# Patient Record
Sex: FEMALE | Race: WHITE | NOT HISPANIC OR LATINO | Employment: OTHER | URBAN - NONMETROPOLITAN AREA
[De-identification: names, ages, dates, MRNs, and addresses within clinical notes are randomized per-mention and may not be internally consistent; named-entity substitution may affect disease eponyms.]

---

## 2019-07-29 ENCOUNTER — APPOINTMENT (OUTPATIENT)
Dept: GENERAL RADIOLOGY | Facility: HOSPITAL | Age: 64
End: 2019-07-29

## 2019-07-29 ENCOUNTER — HOSPITAL ENCOUNTER (EMERGENCY)
Facility: HOSPITAL | Age: 64
Discharge: HOME | End: 2019-07-29
Attending: FAMILY MEDICINE | Admitting: FAMILY MEDICINE

## 2019-07-29 ENCOUNTER — APPOINTMENT (OUTPATIENT)
Dept: CT IMAGING | Facility: HOSPITAL | Age: 64
End: 2019-07-29

## 2019-07-29 VITALS
TEMPERATURE: 98.2 F | WEIGHT: 174 LBS | HEIGHT: 65 IN | RESPIRATION RATE: 18 BRPM | OXYGEN SATURATION: 98 % | SYSTOLIC BLOOD PRESSURE: 134 MMHG | DIASTOLIC BLOOD PRESSURE: 69 MMHG | HEART RATE: 61 BPM | BODY MASS INDEX: 28.99 KG/M2

## 2019-07-29 DIAGNOSIS — R07.9 CHEST PAIN WITH LOW RISK FOR CARDIAC ETIOLOGY: Primary | ICD-10-CM

## 2019-07-29 LAB
ALBUMIN SERPL-MCNC: 3.84 G/DL (ref 3.5–5.2)
ALBUMIN/GLOB SERPL: 1.1 G/DL
ALP SERPL-CCNC: 92 U/L (ref 39–117)
ALT SERPL W P-5'-P-CCNC: 15 U/L (ref 1–33)
ANION GAP SERPL CALCULATED.3IONS-SCNC: 14.3 MMOL/L (ref 5–15)
AST SERPL-CCNC: 23 U/L (ref 1–32)
BASOPHILS # BLD AUTO: 0.04 10*3/MM3 (ref 0–0.2)
BASOPHILS NFR BLD AUTO: 0.4 % (ref 0–1.5)
BILIRUB SERPL-MCNC: 0.6 MG/DL (ref 0.2–1.2)
BILIRUB UR QL STRIP: NEGATIVE
BUN BLD-MCNC: 9 MG/DL (ref 8–23)
BUN/CREAT SERPL: 10.7 (ref 7–25)
CALCIUM SPEC-SCNC: 9.6 MG/DL (ref 8.6–10.5)
CHLORIDE SERPL-SCNC: 99 MMOL/L (ref 98–107)
CLARITY UR: CLEAR
CO2 SERPL-SCNC: 25.7 MMOL/L (ref 22–29)
COLOR UR: YELLOW
CREAT BLD-MCNC: 0.84 MG/DL (ref 0.57–1)
D DIMER PPP FEU-MCNC: 0.59 MCGFEU/ML (ref 0–0.5)
DEPRECATED RDW RBC AUTO: 44.8 FL (ref 37–54)
EOSINOPHIL # BLD AUTO: 0.21 10*3/MM3 (ref 0–0.4)
EOSINOPHIL NFR BLD AUTO: 1.9 % (ref 0.3–6.2)
ERYTHROCYTE [DISTWIDTH] IN BLOOD BY AUTOMATED COUNT: 13.1 % (ref 12.3–15.4)
GFR SERPL CREATININE-BSD FRML MDRD: 69 ML/MIN/1.73
GLOBULIN UR ELPH-MCNC: 3.5 GM/DL
GLUCOSE BLD-MCNC: 107 MG/DL (ref 65–99)
GLUCOSE UR STRIP-MCNC: NEGATIVE MG/DL
HCT VFR BLD AUTO: 45 % (ref 34–46.6)
HGB BLD-MCNC: 15.3 G/DL (ref 12–15.9)
HGB UR QL STRIP.AUTO: NEGATIVE
HOLD SPECIMEN: NORMAL
HOLD SPECIMEN: NORMAL
IMM GRANULOCYTES # BLD AUTO: 0.03 10*3/MM3 (ref 0–0.05)
IMM GRANULOCYTES NFR BLD AUTO: 0.3 % (ref 0–0.5)
KETONES UR QL STRIP: ABNORMAL
LEUKOCYTE ESTERASE UR QL STRIP.AUTO: NEGATIVE
LIPASE SERPL-CCNC: 12 U/L (ref 13–60)
LYMPHOCYTES # BLD AUTO: 2.52 10*3/MM3 (ref 0.7–3.1)
LYMPHOCYTES NFR BLD AUTO: 22.8 % (ref 19.6–45.3)
MCH RBC QN AUTO: 32 PG (ref 26.6–33)
MCHC RBC AUTO-ENTMCNC: 34 G/DL (ref 31.5–35.7)
MCV RBC AUTO: 94.1 FL (ref 79–97)
MONOCYTES # BLD AUTO: 1.05 10*3/MM3 (ref 0.1–0.9)
MONOCYTES NFR BLD AUTO: 9.5 % (ref 5–12)
NEUTROPHILS # BLD AUTO: 7.18 10*3/MM3 (ref 1.7–7)
NEUTROPHILS NFR BLD AUTO: 65.1 % (ref 42.7–76)
NITRITE UR QL STRIP: NEGATIVE
NT-PROBNP SERPL-MCNC: 72.1 PG/ML (ref 5–900)
PH UR STRIP.AUTO: <=5 [PH] (ref 5–8)
PLATELET # BLD AUTO: 214 10*3/MM3 (ref 140–450)
PMV BLD AUTO: 12.4 FL (ref 6–12)
POTASSIUM BLD-SCNC: 4 MMOL/L (ref 3.5–5.2)
PROT SERPL-MCNC: 7.3 G/DL (ref 6–8.5)
PROT UR QL STRIP: NEGATIVE
RBC # BLD AUTO: 4.78 10*6/MM3 (ref 3.77–5.28)
SODIUM BLD-SCNC: 139 MMOL/L (ref 136–145)
SP GR UR STRIP: 1.01 (ref 1–1.03)
TROPONIN T SERPL-MCNC: <0.01 NG/ML (ref 0–0.03)
TROPONIN T SERPL-MCNC: <0.01 NG/ML (ref 0–0.03)
UROBILINOGEN UR QL STRIP: ABNORMAL
WBC NRBC COR # BLD: 11.03 10*3/MM3 (ref 3.4–10.8)
WHOLE BLOOD HOLD SPECIMEN: NORMAL
WHOLE BLOOD HOLD SPECIMEN: NORMAL

## 2019-07-29 PROCEDURE — 99284 EMERGENCY DEPT VISIT MOD MDM: CPT

## 2019-07-29 PROCEDURE — 96360 HYDRATION IV INFUSION INIT: CPT

## 2019-07-29 PROCEDURE — 71046 X-RAY EXAM CHEST 2 VIEWS: CPT | Performed by: RADIOLOGY

## 2019-07-29 PROCEDURE — 96361 HYDRATE IV INFUSION ADD-ON: CPT

## 2019-07-29 PROCEDURE — 81003 URINALYSIS AUTO W/O SCOPE: CPT | Performed by: FAMILY MEDICINE

## 2019-07-29 PROCEDURE — 80053 COMPREHEN METABOLIC PANEL: CPT | Performed by: FAMILY MEDICINE

## 2019-07-29 PROCEDURE — 85379 FIBRIN DEGRADATION QUANT: CPT | Performed by: FAMILY MEDICINE

## 2019-07-29 PROCEDURE — 0 IOVERSOL 74 % SOLUTION: Performed by: FAMILY MEDICINE

## 2019-07-29 PROCEDURE — 84484 ASSAY OF TROPONIN QUANT: CPT | Performed by: FAMILY MEDICINE

## 2019-07-29 PROCEDURE — 93010 ELECTROCARDIOGRAM REPORT: CPT | Performed by: INTERNAL MEDICINE

## 2019-07-29 PROCEDURE — 93005 ELECTROCARDIOGRAM TRACING: CPT | Performed by: FAMILY MEDICINE

## 2019-07-29 PROCEDURE — 93005 ELECTROCARDIOGRAM TRACING: CPT | Performed by: EMERGENCY MEDICINE

## 2019-07-29 PROCEDURE — 71046 X-RAY EXAM CHEST 2 VIEWS: CPT

## 2019-07-29 PROCEDURE — 83690 ASSAY OF LIPASE: CPT | Performed by: FAMILY MEDICINE

## 2019-07-29 PROCEDURE — 83880 ASSAY OF NATRIURETIC PEPTIDE: CPT | Performed by: FAMILY MEDICINE

## 2019-07-29 PROCEDURE — 85025 COMPLETE CBC W/AUTO DIFF WBC: CPT | Performed by: FAMILY MEDICINE

## 2019-07-29 PROCEDURE — 71275 CT ANGIOGRAPHY CHEST: CPT

## 2019-07-29 PROCEDURE — 71275 CT ANGIOGRAPHY CHEST: CPT | Performed by: RADIOLOGY

## 2019-07-29 RX ORDER — ASPIRIN 325 MG
325 TABLET ORAL ONCE
Status: COMPLETED | OUTPATIENT
Start: 2019-07-29 | End: 2019-07-29

## 2019-07-29 RX ORDER — SODIUM CHLORIDE 0.9 % (FLUSH) 0.9 %
10 SYRINGE (ML) INJECTION AS NEEDED
Status: DISCONTINUED | OUTPATIENT
Start: 2019-07-29 | End: 2019-07-29 | Stop reason: HOSPADM

## 2019-07-29 RX ORDER — SODIUM CHLORIDE 9 MG/ML
125 INJECTION, SOLUTION INTRAVENOUS CONTINUOUS
Status: DISCONTINUED | OUTPATIENT
Start: 2019-07-29 | End: 2019-07-29 | Stop reason: HOSPADM

## 2019-07-29 RX ADMIN — IOVERSOL 100 ML: 741 INJECTION INTRA-ARTERIAL; INTRAVENOUS at 18:27

## 2019-07-29 RX ADMIN — SODIUM CHLORIDE 125 ML/HR: 9 INJECTION, SOLUTION INTRAVENOUS at 14:52

## 2019-07-29 RX ADMIN — NITROGLYCERIN 1 INCH: 20 OINTMENT TOPICAL at 14:52

## 2019-07-29 RX ADMIN — ASPIRIN 325 MG: 325 TABLET ORAL at 14:52

## 2019-08-04 NOTE — ED PROVIDER NOTES
Subjective     History provided by:  Patient  Chest Pain   Pain location:  L chest  Pain quality: aching    Pain radiates to:  Does not radiate  Pain severity:  Moderate  Onset quality:  Gradual  Timing:  Intermittent  Progression:  Waxing and waning  Chronicity:  New  Context: at rest    Relieved by:  Nothing  Worsened by:  Nothing  Ineffective treatments:  None tried  Associated symptoms: nausea    Associated symptoms: no abdominal pain and no fever    Risk factors: hypertension        Review of Systems   Constitutional: Negative.  Negative for fever.   HENT: Negative.    Respiratory: Negative.    Cardiovascular: Positive for chest pain.   Gastrointestinal: Positive for nausea. Negative for abdominal pain.   Endocrine: Negative.    Genitourinary: Negative.  Negative for dysuria.   Skin: Negative.    Neurological: Negative.    Psychiatric/Behavioral: Negative.    All other systems reviewed and are negative.      History reviewed. No pertinent past medical history.    Allergies   Allergen Reactions   • Penicillins Anaphylaxis       Past Surgical History:   Procedure Laterality Date   • APPENDECTOMY     • BACK SURGERY     •  SECTION     • CHOLECYSTECTOMY     • HYSTERECTOMY     • SHOULDER ACROMIOPLASTY         History reviewed. No pertinent family history.    Social History     Socioeconomic History   • Marital status:      Spouse name: Not on file   • Number of children: Not on file   • Years of education: Not on file   • Highest education level: Not on file   Tobacco Use   • Smoking status: Current Some Day Smoker     Packs/day: 1.00   Substance and Sexual Activity   • Alcohol use: No     Frequency: Never           Objective   Physical Exam   Constitutional: She is oriented to person, place, and time. She appears well-developed and well-nourished.   HENT:   Head: Normocephalic and atraumatic.   Right Ear: External ear normal.   Left Ear: External ear normal.   Mouth/Throat: Oropharynx is clear and  moist.   Eyes: EOM are normal. Pupils are equal, round, and reactive to light.   Neck: Neck supple.   Cardiovascular: Normal rate and regular rhythm.   Pulmonary/Chest: Effort normal and breath sounds normal.   Abdominal: Soft. Bowel sounds are normal. She exhibits no distension. There is no tenderness.   Musculoskeletal: Normal range of motion.   Neurological: She is alert and oriented to person, place, and time.   Skin: Skin is warm. Capillary refill takes less than 2 seconds.   Psychiatric: She has a normal mood and affect. Her behavior is normal. Judgment and thought content normal.   Nursing note and vitals reviewed.      Procedures           ED Course  ED Course as of Aug 04 1440 Mon Jul 29, 2019   1905 CT PE protocol #1 mild edema of the pancreatic tail with stranding consistent with pancreatitis #2 no PE #3 no infiltrate #4 central lobar emphysema #5 evidence of old granulomatous disease per virtual radiologic  []   1905 Discussed with patient that her lipase is low patient has had no vomiting is feeling better now was able to tolerate a tray and had no problems with that I have discussed with her the CT findings and is felt that pancreatitis is very low however have advised her that if she gets to the point where she gets abdominal pain or vomiting that she needs to come back to be evaluated  []      ED Course User Index  [] Violette Ariza,                   MDM  Number of Diagnoses or Management Options  Chest pain with low risk for cardiac etiology: new and requires workup     Amount and/or Complexity of Data Reviewed  Clinical lab tests: ordered and reviewed  Tests in the radiology section of CPT®: ordered and reviewed  Tests in the medicine section of CPT®: reviewed and ordered  Independent visualization of images, tracings, or specimens: yes    Risk of Complications, Morbidity, and/or Mortality  Presenting problems: high  Diagnostic procedures: moderate  Management options:  moderate    Patient Progress  Patient progress: stable        Final diagnoses:   Chest pain with low risk for cardiac etiology            Violette Ariza,   08/04/19 5001

## 2019-08-05 DIAGNOSIS — R06.02 SHORTNESS OF BREATH: Primary | ICD-10-CM

## 2019-08-07 ENCOUNTER — HOSPITAL ENCOUNTER (OUTPATIENT)
Dept: GENERAL RADIOLOGY | Facility: HOSPITAL | Age: 64
Discharge: HOME | End: 2019-08-07
Admitting: INTERNAL MEDICINE

## 2019-08-07 PROCEDURE — 71046 X-RAY EXAM CHEST 2 VIEWS: CPT

## 2019-08-07 PROCEDURE — 71046 X-RAY EXAM CHEST 2 VIEWS: CPT | Performed by: RADIOLOGY

## 2019-08-08 ENCOUNTER — OFFICE VISIT (OUTPATIENT)
Dept: PULMONOLOGY | Facility: CLINIC | Age: 64
End: 2019-08-08

## 2019-08-08 VITALS
BODY MASS INDEX: 29.16 KG/M2 | TEMPERATURE: 97.8 F | OXYGEN SATURATION: 95 % | HEIGHT: 65 IN | HEART RATE: 64 BPM | SYSTOLIC BLOOD PRESSURE: 128 MMHG | DIASTOLIC BLOOD PRESSURE: 72 MMHG | WEIGHT: 175 LBS

## 2019-08-08 DIAGNOSIS — J44.9 CHRONIC OBSTRUCTIVE PULMONARY DISEASE, UNSPECIFIED COPD TYPE (HCC): Primary | ICD-10-CM

## 2019-08-08 LAB
FEV1: 1.44 LITERS
FVC VOL RESPIRATORY: 2.11 LITERS

## 2019-08-08 PROCEDURE — 99203 OFFICE O/P NEW LOW 30 MIN: CPT | Performed by: INTERNAL MEDICINE

## 2019-08-08 PROCEDURE — 94010 BREATHING CAPACITY TEST: CPT | Performed by: INTERNAL MEDICINE

## 2019-08-08 ASSESSMENT — PULMONARY FUNCTION TESTS
FVC: 2.11
FEV1: 1.44

## 2019-08-08 NOTE — PROGRESS NOTES
Subjective   Chief Complaint   Patient presents with   • COPD       Itzel Hagen is a 63 y.o. female     History of Present Illness 63-year-old female referred for evaluation of COPD she is a  who admits to smoking 2 packs a day while on the road she has been doing it since age 15 when she is at home she smokes 21 pack a day takes Breo once a day and has some cigarette cough but denies having any shortness of breath her  a 44 years quit smoking 4 years ago    Review of Systems minimal cigarette cough but denies any shortness of breath and continues to work as a  no other health problems    History reviewed. No pertinent family history.    History reviewed. No pertinent past medical history.    Past Surgical History:   Procedure Laterality Date   • APPENDECTOMY     • BACK SURGERY     •  SECTION     • CHOLECYSTECTOMY     • HYSTERECTOMY     • SHOULDER ACROMIOPLASTY         Social History     Socioeconomic History   • Marital status:      Spouse name: Not on file   • Number of children: Not on file   • Years of education: Not on file   • Highest education level: Not on file   Tobacco Use   • Smoking status: Current Some Day Smoker     Packs/day: 1.00   Substance and Sexual Activity   • Alcohol use: No     Frequency: Never        Physical Exam: No acute distress vital signs are stable O2 sat 95% on room air lungs clear heart regular no clubbing cyanosis or edema    PFT: FVC 63 FEV1 56% indicating moderate obstructive impairment    Imaging: Chest x-ray of  was clear CT to rule out PE that was negative    Other Labs:       ASSESSMENT COPD clinically mild to moderate obstructive impairment per PFT normal chest x-ray and CT        Recommendations: Strongly urged her to quit smoking but does not seem to be motivated advised her to continue Breo and should get annual flu vaccine pneumonia vaccine per protocol and advised her to get CT chest once a year in July for 15  years after she quit smoking    Follow up: Does not seem to be motivated to quit smoking and may not follow any other instruction however she is welcome to come back and see us 2 months after her last cigarette to prove that her PFT will improve and she will have more breath although she denies any shortness of breath at this point

## 2019-08-15 ENCOUNTER — TRANSCRIBE ORDERS (OUTPATIENT)
Dept: ADMINISTRATIVE | Facility: HOSPITAL | Age: 64
End: 2019-08-15

## 2019-08-15 DIAGNOSIS — R07.9 CHEST PAIN, UNSPECIFIED TYPE: Primary | ICD-10-CM

## 2019-08-23 ENCOUNTER — HOSPITAL ENCOUNTER (OUTPATIENT)
Dept: NUCLEAR MEDICINE | Facility: HOSPITAL | Age: 64
End: 2019-08-23

## 2019-08-23 ENCOUNTER — HOSPITAL ENCOUNTER (OUTPATIENT)
Dept: NUCLEAR MEDICINE | Facility: HOSPITAL | Age: 64
Discharge: HOME | End: 2019-08-23

## 2019-08-23 ENCOUNTER — HOSPITAL ENCOUNTER (OUTPATIENT)
Dept: CARDIOLOGY | Facility: HOSPITAL | Age: 64
End: 2019-08-23

## 2019-08-23 DIAGNOSIS — R07.9 CHEST PAIN, UNSPECIFIED TYPE: ICD-10-CM

## 2019-08-28 ENCOUNTER — HOSPITAL ENCOUNTER (OUTPATIENT)
Dept: NUCLEAR MEDICINE | Facility: HOSPITAL | Age: 64
Discharge: HOME | End: 2019-08-28

## 2019-08-28 ENCOUNTER — HOSPITAL ENCOUNTER (OUTPATIENT)
Dept: CARDIOLOGY | Facility: HOSPITAL | Age: 64
Discharge: HOME | End: 2019-08-28

## 2019-08-28 LAB
BH CV NUCLEAR PRIOR STUDY: 3
BH CV STRESS BP STAGE 1: NORMAL
BH CV STRESS BP STAGE 2: NORMAL
BH CV STRESS COMMENTS STAGE 1: NORMAL
BH CV STRESS COMMENTS STAGE 2: NORMAL
BH CV STRESS DOSE REGADENOSON STAGE 1: 0.4
BH CV STRESS DURATION MIN STAGE 1: 0
BH CV STRESS DURATION MIN STAGE 2: 4
BH CV STRESS DURATION SEC STAGE 1: 10
BH CV STRESS DURATION SEC STAGE 2: 0
BH CV STRESS HR STAGE 1: 93
BH CV STRESS HR STAGE 2: 74
BH CV STRESS PROTOCOL 1: NORMAL
BH CV STRESS RECOVERY BP: NORMAL MMHG
BH CV STRESS RECOVERY HR: 74 BPM
BH CV STRESS STAGE 1: 1
BH CV STRESS STAGE 2: 2
MAXIMAL PREDICTED HEART RATE: 158 BPM
PERCENT MAX PREDICTED HR: 58.86 %
STRESS BASELINE BP: NORMAL MMHG
STRESS BASELINE HR: 63 BPM
STRESS PERCENT HR: 69 %
STRESS POST PEAK BP: NORMAL MMHG
STRESS POST PEAK HR: 93 BPM
STRESS TARGET HR: 134 BPM

## 2019-08-28 PROCEDURE — 0 TECHNETIUM SESTAMIBI: Performed by: FAMILY MEDICINE

## 2019-08-28 PROCEDURE — 25010000002 REGADENOSON 0.4 MG/5ML SOLUTION: Performed by: FAMILY MEDICINE

## 2019-08-28 PROCEDURE — A9500 TC99M SESTAMIBI: HCPCS | Performed by: FAMILY MEDICINE

## 2019-08-28 PROCEDURE — 93017 CV STRESS TEST TRACING ONLY: CPT

## 2019-08-28 PROCEDURE — 78452 HT MUSCLE IMAGE SPECT MULT: CPT

## 2019-08-28 PROCEDURE — 78452 HT MUSCLE IMAGE SPECT MULT: CPT | Performed by: INTERNAL MEDICINE

## 2019-08-28 PROCEDURE — 93018 CV STRESS TEST I&R ONLY: CPT | Performed by: INTERNAL MEDICINE

## 2019-08-28 RX ORDER — NITROGLYCERIN 0.4 MG/1
0.4 TABLET SUBLINGUAL
COMMUNITY

## 2019-08-28 RX ORDER — ALBUTEROL SULFATE 90 UG/1
2 AEROSOL, METERED RESPIRATORY (INHALATION) EVERY 4 HOURS PRN
COMMUNITY
End: 2019-10-15 | Stop reason: HOSPADM

## 2019-08-28 RX ORDER — SIMVASTATIN 20 MG
20 TABLET ORAL NIGHTLY
COMMUNITY
End: 2020-05-28 | Stop reason: SDUPTHER

## 2019-08-28 RX ORDER — ASPIRIN 81 MG/1
81 TABLET ORAL DAILY
COMMUNITY
End: 2019-10-15 | Stop reason: HOSPADM

## 2019-08-28 RX ADMIN — TECHNETIUM TC 99M SESTAMIBI 1 DOSE: 1 INJECTION INTRAVENOUS at 09:00

## 2019-08-28 RX ADMIN — TECHNETIUM TC 99M SESTAMIBI 1 DOSE: 1 INJECTION INTRAVENOUS at 10:21

## 2019-08-28 RX ADMIN — REGADENOSON 0.4 MG: 0.08 INJECTION, SOLUTION INTRAVENOUS at 10:21

## 2019-08-29 ENCOUNTER — OFFICE VISIT (OUTPATIENT)
Dept: PULMONOLOGY | Facility: CLINIC | Age: 64
End: 2019-08-29

## 2019-08-29 VITALS
HEIGHT: 65 IN | HEART RATE: 73 BPM | OXYGEN SATURATION: 96 % | DIASTOLIC BLOOD PRESSURE: 82 MMHG | TEMPERATURE: 98 F | WEIGHT: 177 LBS | BODY MASS INDEX: 29.49 KG/M2 | SYSTOLIC BLOOD PRESSURE: 141 MMHG

## 2019-08-29 DIAGNOSIS — J41.0 SIMPLE CHRONIC BRONCHITIS (HCC): Primary | ICD-10-CM

## 2019-08-29 DIAGNOSIS — F17.210 CIGARETTE NICOTINE DEPENDENCE WITHOUT COMPLICATION: ICD-10-CM

## 2019-08-29 PROCEDURE — 94618 PULMONARY STRESS TESTING: CPT | Performed by: NURSE PRACTITIONER

## 2019-08-29 PROCEDURE — 94664 DEMO&/EVAL PT USE INHALER: CPT | Performed by: NURSE PRACTITIONER

## 2019-08-29 PROCEDURE — 99214 OFFICE O/P EST MOD 30 MIN: CPT | Performed by: NURSE PRACTITIONER

## 2019-08-29 PROCEDURE — 99407 BEHAV CHNG SMOKING > 10 MIN: CPT | Performed by: NURSE PRACTITIONER

## 2019-08-29 RX ORDER — NICOTINE 21 MG/24HR
1 PATCH, TRANSDERMAL 24 HOURS TRANSDERMAL EVERY 24 HOURS
Qty: 28 PATCH | Refills: 1 | Status: SHIPPED | OUTPATIENT
Start: 2019-08-29 | End: 2019-10-02 | Stop reason: ALTCHOICE

## 2019-08-29 NOTE — PROGRESS NOTES
Interval history since last visit:    Recent hospitalizations:    Investigations (imaging, PFT's, labs, sleep study, record requests, etc.)    Have you had the Influenza Vaccine? no   Would you like to receive this Vaccine today? no    Have you had the Pneumonia Vaccine?  yes   Would you like to receive this Vaccine today? no    Subjective    Itzel Hagen presents for the following COPD  .    History of Present Illness     Ms. Hagen is a 63 year old female that has a medical history significant for COPD and hyperlipidemia.    She presents today for further evaluation of COPD. She states that her PCP put her on Breo once daily and that this inhaler seemed to help her but that her insurance did not cover it.  She states that she has since been started on Advair HFA inhaler, 2 puffs twice daily.  She states that this inhaler does not seem to help her as well and she continues to have a cough and shortness of breath.  She states that she is currently a smoker of about 1 pack/day and states that she would like to quit smoking for 50 years.  Spirometry was obtained at her last office visit on 8/8/2019.  Spirometry showed an FEV1/FVC ratio of 68 and an FEV1 of 56%.        Review of Systems   Respiratory: Positive for cough. Negative for chest tightness, shortness of breath and wheezing.    Cardiovascular: Negative for chest pain, palpitations and leg swelling.   Neurological: Negative for dizziness, light-headedness and headaches.       Active Problems:  Problem List Items Addressed This Visit     None      Visit Diagnoses     Simple chronic bronchitis (CMS/[facility])    -  Primary    Relevant Medications    tiotropium bromide-olodaterol (STIOLTO RESPIMAT) 2.5-2.5 MCG/ACT aerosol solution inhaler    Other Relevant Orders    Full Pulmonary Function Test With Bronchodilator    Overnight Sleep Oximetry Study    Cigarette nicotine dependence without complication        Relevant Medications    nicotine (NICODERM CQ) 21  "MG/24HR patch    Other Relevant Orders    Ambulatory Referral to Smoking Cessation Program          Past Medical History:  Past Medical History:   Diagnosis Date   • Hyperlipidemia        Family History:  Family History   Problem Relation Age of Onset   • Heart disease Mother    • Heart disease Father    • Heart disease Brother        Social History:  Social History     Tobacco Use   • Smoking status: Current Some Day Smoker     Packs/day: 1.00   Substance Use Topics   • Alcohol use: No     Frequency: Never       Current Medications:  Current Outpatient Medications   Medication Sig Dispense Refill   • albuterol sulfate  (90 Base) MCG/ACT inhaler Inhale 2 puffs Every 4 (Four) Hours As Needed for Wheezing.     • aspirin 81 MG EC tablet Take 81 mg by mouth Daily.     • nitroglycerin (NITROSTAT) 0.4 MG SL tablet Place 0.4 mg under the tongue Every 5 (Five) Minutes As Needed for Chest Pain. Take no more than 3 doses in 15 minutes.     • pancrelipase, Lip-Prot-Amyl, (CREON) 98146-21608 units capsule delayed-release particles capsule Take 24,000 units of lipase by mouth 3 (Three) Times a Day With Meals.     • simvastatin (ZOCOR) 20 MG tablet Take 20 mg by mouth Every Night.     • nicotine (NICODERM CQ) 21 MG/24HR patch Place 1 patch on the skin as directed by provider Daily. 28 patch 1   • tiotropium bromide-olodaterol (STIOLTO RESPIMAT) 2.5-2.5 MCG/ACT aerosol solution inhaler Inhale 1 puff Daily. 4 g 6     No current facility-administered medications for this visit.        Allergies:  Allergies   Allergen Reactions   • Penicillins Anaphylaxis       Vitals:  /82   Pulse 73   Temp 98 °F (36.7 °C)   Ht 165.1 cm (65\")   Wt 80.3 kg (177 lb)   LMP  (LMP Unknown)   SpO2 96%   BMI 29.45 kg/m²     Imaging:    Imaging Results (most recent)     None          Pulmonary Functions Testing Results:    FEV1   Date Value Ref Range Status   08/08/2019 1.44 liters Final     FVC   Date Value Ref Range Status   08/08/2019 " 2.11 liters Final       Results for orders placed or performed during the hospital encounter of 08/23/19   Stress test with myocardial perfusion one day   Result Value Ref Range    Nuclear Prior Study 3     Target HR (85%) 134 bpm    Max. Pred. HR (100%) 158 bpm    BH CV STRESS PROTOCOL 1 Pharmacologic     Stage 1 1     HR Stage 1 93     BP Stage 1 133/51     Duration Min Stage 1 0     Duration Sec Stage 1 10     Stress Dose Regadenoson Stage 1 0.4     Stress Comments Stage 1 10 sec bolus injection     Stage 2 2     HR Stage 2 74     BP Stage 2 153/66     Duration Min Stage 2 4     Duration Sec Stage 2 0     Stress Comments Stage 2 recovery     Baseline HR 63 bpm    Baseline /73 mmHg    Peak HR 93 bpm    Percent Max Pred HR 58.86 %    Percent Target HR 69 %    Peak /51 mmHg    Recovery HR 74 bpm    Recovery /66 mmHg       Objective   Physical Exam     GENERAL APPEARANCE: Well developed, well nourished, alert and cooperative, and appears to be in no acute distress.    HEAD: normocephalic. Atraumatic.    EYES: PERRL, EOMI. Fundi normal, vision is grossly intact.    THROAT: Oral cavity and pharynx normal. No inflammation, swelling, exudate, or lesions.     NECK: Neck supple.  No thyromegaly.    CARDIAC: Normal S1 and S2. No S3, S4 or murmurs. Rhythm is regular. There is no peripheral edema, cyanosis or pallor. Extremities are warm and well perfused. Capillary refill is less than 2 seconds. No carotid bruits.    RESPIRATORY:Bilateral air entry positive. Bilateral diminished breath sounds. No wheezing, crackles or rhonchi noted.    GI: Positive bowel sounds. Soft, nondistended, nontender.     MUSCULOSKELETAL: No significant deformity or joint abnormality. No edema. Peripheral pulses intact. No varicosities.    NEUROLOGICAL: Strength and sensation symmetric and intact throughout.     PSYCHIATRIC: The mental examination revealed the patient was oriented to person, place, and time.       Assessment/Plan       COPD:  -Order a complete PFT to assess lung function.  -We will start her on stioloto, 1 puff once daily.  As this is a new inhaler for her inhaler technique was  observed and education was provided.       - Inhaler technique demonstration/discussion:  I have extensively discussed the steps.  In summary, the steps were discussed in the following order.Patient was advised to rinse the mouth after steroid inhalation to prevent fungal mucositis.  · Remove the cap from the inhaler and shake well.    · Breathe out all the way.    · Place the mouthpiece of the inhaler between your teeth and seal your lips tightly around it.    · As you start to breathe in slowly, press down on the canister one time.   · Keep breathing in as slowly and deeply as you can.    · It should take about 5 seconds for you to completely breathe in.    · Hold your breath for 10 seconds(count to 10 slowly) to allow the medication to reach the airways of the lung.    · Repeat the above steps for each puff.    · Wait about 1 minute between the puffs.    · Replaced the cap on the inhaler when finished.    -An alpha-1 antitrypsin level and genotype.  - 6 MWT completed in the office, her oxygen saturation stayed above 93%.  -Ordered an overnight pulse oximetry study.  -Will discuss low dose CT for lung cancer screening at next visit.    Current smoker:  -Itzel Hagen is a current cigarettes user.  She currently smokes 1 pack of cigarettes per day for a duration of 50 years. I have educated her on the risk of diseases from using tobacco products such as cancer, COPD and heart diease.     I advised her to quit and she is willing to quit. We have discussed the following method/s for tobacco cessation:  Counseling OTC Cessation Products Prescription Medicaiton referral to smoking cessation program.  Together we have set a quit date for 1 month from today.  She will follow up with me in a few months or sooner to check on her progress.    I spent >10  minutes counseling the patient.    -Will send prescription for nicotine patches.    -Patient's Body mass index is 29.45 kg/m². BMI is within normal parameters. No follow-up required..          ICD-10-CM ICD-9-CM   1. Simple chronic bronchitis (CMS/HCC) J41.0 491.0   2. Cigarette nicotine dependence without complication F17.210 305.1       Return in about 3 months (around 11/29/2019).

## 2019-09-04 ENCOUNTER — OFFICE VISIT (OUTPATIENT)
Dept: CARDIOLOGY | Facility: CLINIC | Age: 64
End: 2019-09-04

## 2019-09-04 ENCOUNTER — PREP FOR SURGERY (OUTPATIENT)
Dept: OTHER | Facility: HOSPITAL | Age: 64
End: 2019-09-04

## 2019-09-04 ENCOUNTER — TRANSCRIBE ORDERS (OUTPATIENT)
Dept: PULMONOLOGY | Facility: CLINIC | Age: 64
End: 2019-09-04

## 2019-09-04 VITALS
BODY MASS INDEX: 29.82 KG/M2 | OXYGEN SATURATION: 99 % | DIASTOLIC BLOOD PRESSURE: 73 MMHG | SYSTOLIC BLOOD PRESSURE: 128 MMHG | HEART RATE: 64 BPM | HEIGHT: 65 IN | WEIGHT: 179 LBS

## 2019-09-04 DIAGNOSIS — Z82.49 FAMILY HISTORY OF PREMATURE CORONARY ARTERY DISEASE: ICD-10-CM

## 2019-09-04 DIAGNOSIS — I20.9 ANGINA, CLASS III (HCC): Primary | ICD-10-CM

## 2019-09-04 DIAGNOSIS — Z72.0 TOBACCO ABUSE: Primary | ICD-10-CM

## 2019-09-04 DIAGNOSIS — I20.9 ANGINA, CLASS III (HCC): ICD-10-CM

## 2019-09-04 DIAGNOSIS — Z72.0 TOBACCO ABUSE: ICD-10-CM

## 2019-09-04 DIAGNOSIS — R07.2 PRECORDIAL PAIN: Primary | ICD-10-CM

## 2019-09-04 PROCEDURE — 99204 OFFICE O/P NEW MOD 45 MIN: CPT | Performed by: INTERNAL MEDICINE

## 2019-09-04 PROCEDURE — 93000 ELECTROCARDIOGRAM COMPLETE: CPT | Performed by: INTERNAL MEDICINE

## 2019-09-04 RX ORDER — ISOSORBIDE MONONITRATE 60 MG/1
60 TABLET, EXTENDED RELEASE ORAL EVERY MORNING
Qty: 30 TABLET | Refills: 11 | Status: SHIPPED | OUTPATIENT
Start: 2019-09-04 | End: 2019-10-15 | Stop reason: HOSPADM

## 2019-09-04 NOTE — H&P (VIEW-ONLY)
"Maude Snow APRN  Itzel Hagen  1955 09/04/2019    Patient Active Problem List   Diagnosis   • Precordial pain   • Angina, class III (CMS/HCC)   • Tobacco abuse   • Family history of premature coronary artery disease       Dear Maude Snow APRN:    Subjective     Itzel Hagen is a 63 y.o. female with the problems as listed above, presents    Chief complaint: Recurrent chest pains.    History of Present Illness: Ms. Cuevas is a pleasant 63-year-old female with history of smoking since age 16 smoking up to 2 packs a day, strong family history of premature heart disease with her younger brother having had heart attacks in his 50s, presents with complaints of having recurrent chest pains since July 26, 2019.  She describes these pains as being felt as substernal pressure and tightness and sometimes feels like \"somebody sitting on my chest\".  She has these chest pains with and without exertion.  Last time she had this chest pain was about couple of weeks ago while she was walking around in a hot weather when she took a sublingual nitroglycerin that was prescribed by her PCP prior to that.  This apparently relieved her chest pain.  She has not had any more chest pain or discomfort since then.  She has dyspnea with moderate exertion with no PND, orthopnea pedal edema.  Patient is a professional .    Cardiac risk factors:smoking, Positive family Hx. of premature athersclerotivc disease. and Age and postmenopausal status.    Allergies   Allergen Reactions   • Penicillins Anaphylaxis   • Morphine Swelling   :      Current Outpatient Medications:   •  albuterol sulfate  (90 Base) MCG/ACT inhaler, Inhale 2 puffs Every 4 (Four) Hours As Needed for Wheezing., Disp: , Rfl:   •  aspirin 81 MG EC tablet, Take 81 mg by mouth Daily., Disp: , Rfl:   •  nitroglycerin (NITROSTAT) 0.4 MG SL tablet, Place 0.4 mg under the tongue Every 5 (Five) Minutes As Needed for Chest Pain. " Take no more than 3 doses in 15 minutes., Disp: , Rfl:   •  pancrelipase, Lip-Prot-Amyl, (CREON) 08539-90655 units capsule delayed-release particles capsule, Take 24,000 units of lipase by mouth 3 (Three) Times a Day With Meals., Disp: , Rfl:   •  simvastatin (ZOCOR) 20 MG tablet, Take 20 mg by mouth Every Night., Disp: , Rfl:   •  tiotropium bromide-olodaterol (STIOLTO RESPIMAT) 2.5-2.5 MCG/ACT aerosol solution inhaler, Inhale 1 puff Daily., Disp: 4 g, Rfl: 6  •  isosorbide mononitrate (IMDUR) 60 MG 24 hr tablet, Take 1 tablet by mouth Every Morning., Disp: 30 tablet, Rfl: 11  •  nicotine (NICODERM CQ) 21 MG/24HR patch, Place 1 patch on the skin as directed by provider Daily., Disp: 28 patch, Rfl: 1    History reviewed. No pertinent past medical history.  Past Surgical History:   Procedure Laterality Date   • APPENDECTOMY     • BACK SURGERY     •  SECTION     • CHOLECYSTECTOMY     • HYSTERECTOMY     • SHOULDER ACROMIOPLASTY       Family History   Problem Relation Age of Onset   • Heart disease Mother    • Heart disease Father    • Heart disease Brother      Social History     Tobacco Use   • Smoking status: Current Some Day Smoker     Packs/day: 1.00   Substance Use Topics   • Alcohol use: No     Frequency: Never   • Drug use: Not on file       Review of Systems   Constitution: Positive for malaise/fatigue. Negative for chills, diaphoresis and fever.   Eyes:        Wears glasses   Cardiovascular: Positive for chest pain and palpitations. Negative for leg swelling, orthopnea and paroxysmal nocturnal dyspnea.   Respiratory: Positive for cough and shortness of breath. Negative for hemoptysis.    Endocrine: Negative.  Negative for cold intolerance and heat intolerance.   Hematologic/Lymphatic: Bruises/bleeds easily.   Skin: Negative.  Negative for rash.   Musculoskeletal: Positive for back pain and myalgias.   Gastrointestinal: Positive for abdominal pain. Negative for constipation, diarrhea, nausea and  "vomiting.   Genitourinary: Negative.  Negative for dysuria and hematuria.   Neurological: Positive for headaches. Negative for dizziness, focal weakness and numbness.   Psychiatric/Behavioral: The patient is nervous/anxious.        Objective   Blood pressure 128/73, pulse 64, height 165.1 cm (65\"), weight 81.2 kg (179 lb), SpO2 99 %, not currently breastfeeding.  Body mass index is 29.79 kg/m².      Physical Exam   Constitutional: She is oriented to person, place, and time. She appears well-developed and well-nourished.   HENT:   Mouth/Throat: Oropharynx is clear and moist.   Eyes: EOM are normal. Pupils are equal, round, and reactive to light.   Neck: Neck supple. No JVD present. No tracheal deviation present. No thyromegaly present.   Cardiovascular: Normal rate, regular rhythm, S1 normal and S2 normal. Exam reveals no gallop, no S3, no S4 and no friction rub.   No murmur heard.  Pulses:       Radial pulses are 2+ on the right side, and 2+ on the left side.        Dorsalis pedis pulses are 1+ on the right side, and 1+ on the left side.        Posterior tibial pulses are 2+ on the right side, and 2+ on the left side.   Pulmonary/Chest: Effort normal and breath sounds normal.   Abdominal: Soft. Bowel sounds are normal. She exhibits no mass. There is no tenderness.   Musculoskeletal: Normal range of motion. She exhibits no edema.   Lymphadenopathy:     She has no cervical adenopathy.   Neurological: She is alert and oriented to person, place, and time.   Skin: Skin is warm and dry. No rash noted.   Psychiatric: She has a normal mood and affect.         Itzel Hagen   Regadenoson Stress Test With Myocardial Perfusion SPECT (Multi Study)   Order# [id number]   Reading physician: Dre Izquierdo MD Ordering physician: Violette Ariza DO Study date: 19   Patient Information     Patient Name  Itzel Hagen MRN  [id number] Sex  Female  (Age)  1955 (63 y.o.)   Interpretation Summary     · A " pharmacological stress test was performed using regadenoson with low-level exercise.  · Findings consistent with a normal ECG stress test.  · Myocardial perfusion imaging indicates a normal myocardial perfusion study with no evidence of ischemia.  · Normal LV cavity size. Normal LV wall motion noted.  · Left ventricular ejection fraction is hyperdynamic (Calculated EF > 70%).  · Impressions are consistent with a low risk study.         Lab Results   Component Value Date     07/29/2019    K 4.0 07/29/2019    CL 99 07/29/2019    CO2 25.7 07/29/2019    BUN 9 07/29/2019    CREATININE 0.84 07/29/2019    GLUCOSE 107 (H) 07/29/2019    CALCIUM 9.6 07/29/2019    AST 23 07/29/2019    ALT 15 07/29/2019    ALKPHOS 92 07/29/2019     No results found for: CKTOTAL  Lab Results   Component Value Date    WBC 11.03 (H) 07/29/2019    HGB 15.3 07/29/2019    HCT 45.0 07/29/2019     07/29/2019           ECG 12 Lead  Date/Time: 9/4/2019 3:46 PM  Performed by: Dre Izquierdo MD  Authorized by: Dre Izquierdo MD   Comparison: compared with previous ECG from 7/29/2019  Similar to previous ECG  Rhythm: sinus rhythm  Conduction: conduction normal  ST Segments: ST segments normal  T Waves: T waves normal                  Assessment/Plan :   Diagnosis Plan   1. Precordial pain     2. Angina, class III     3. Tobacco abuse     4. Family history of premature coronary artery disease         Recommendations:    Orders Placed This Encounter   Procedures   • ECG 12 Lead        1. Since patient continues to have recurrent class III anginal symptoms and with her long history of smoking and a strong family history for premature coronary artery disease and her being a professional , I would like to definitely rule out any underlying significant coronary artery disease with cardiac catheterization.  I discussed with her about the procedure of cardiac catheterization, potential risk of benefits and alternative methods of  management.  She expressed understanding of same and is wanting to proceed.  We will try to schedule this in the next couple of days.  2. Continue aspirin 81 mg daily.  Add Imdur 60 mg daily.  3. I have strongly emphasized to her about quitting to smoke and explained the risks of continued smoking.  She said she is going to see a pulmonologist who is trying to see if her insurance will pay for the nicotine patches.    Return in about 4 weeks (around 10/2/2019).    As always,Maude   I appreciate very much the opportunity to participate in the cardiovascular care of your patients. Please do not hesitate to call me with any questions with regards to Itzeljory Torresvidal's evaluation and management.       With Best Regards,        Dre Izquierdo MD, [facility]    Please note that portions of this note were completed with a voice recognition program.

## 2019-09-04 NOTE — PROGRESS NOTES
"Maude Snow APRN  Itzel Hagen  1955 09/04/2019    Patient Active Problem List   Diagnosis   • Precordial pain   • Angina, class III (CMS/HCC)   • Tobacco abuse   • Family history of premature coronary artery disease       Dear Maude Snow APRN:    Subjective     Itzel Hagen is a 63 y.o. female with the problems as listed above, presents    Chief complaint: Recurrent chest pains.    History of Present Illness: Ms. Cuevas is a pleasant 63-year-old female with history of smoking since age 16 smoking up to 2 packs a day, strong family history of premature heart disease with her younger brother having had heart attacks in his 50s, presents with complaints of having recurrent chest pains since July 26, 2019.  She describes these pains as being felt as substernal pressure and tightness and sometimes feels like \"somebody sitting on my chest\".  She has these chest pains with and without exertion.  Last time she had this chest pain was about couple of weeks ago while she was walking around in a hot weather when she took a sublingual nitroglycerin that was prescribed by her PCP prior to that.  This apparently relieved her chest pain.  She has not had any more chest pain or discomfort since then.  She has dyspnea with moderate exertion with no PND, orthopnea pedal edema.  Patient is a professional .    Cardiac risk factors:smoking, Positive family Hx. of premature athersclerotivc disease. and Age and postmenopausal status.    Allergies   Allergen Reactions   • Penicillins Anaphylaxis   • Morphine Swelling   :      Current Outpatient Medications:   •  albuterol sulfate  (90 Base) MCG/ACT inhaler, Inhale 2 puffs Every 4 (Four) Hours As Needed for Wheezing., Disp: , Rfl:   •  aspirin 81 MG EC tablet, Take 81 mg by mouth Daily., Disp: , Rfl:   •  nitroglycerin (NITROSTAT) 0.4 MG SL tablet, Place 0.4 mg under the tongue Every 5 (Five) Minutes As Needed for Chest Pain. " Take no more than 3 doses in 15 minutes., Disp: , Rfl:   •  pancrelipase, Lip-Prot-Amyl, (CREON) 71054-95668 units capsule delayed-release particles capsule, Take 24,000 units of lipase by mouth 3 (Three) Times a Day With Meals., Disp: , Rfl:   •  simvastatin (ZOCOR) 20 MG tablet, Take 20 mg by mouth Every Night., Disp: , Rfl:   •  tiotropium bromide-olodaterol (STIOLTO RESPIMAT) 2.5-2.5 MCG/ACT aerosol solution inhaler, Inhale 1 puff Daily., Disp: 4 g, Rfl: 6  •  isosorbide mononitrate (IMDUR) 60 MG 24 hr tablet, Take 1 tablet by mouth Every Morning., Disp: 30 tablet, Rfl: 11  •  nicotine (NICODERM CQ) 21 MG/24HR patch, Place 1 patch on the skin as directed by provider Daily., Disp: 28 patch, Rfl: 1    History reviewed. No pertinent past medical history.  Past Surgical History:   Procedure Laterality Date   • APPENDECTOMY     • BACK SURGERY     •  SECTION     • CHOLECYSTECTOMY     • HYSTERECTOMY     • SHOULDER ACROMIOPLASTY       Family History   Problem Relation Age of Onset   • Heart disease Mother    • Heart disease Father    • Heart disease Brother      Social History     Tobacco Use   • Smoking status: Current Some Day Smoker     Packs/day: 1.00   Substance Use Topics   • Alcohol use: No     Frequency: Never   • Drug use: Not on file       Review of Systems   Constitution: Positive for malaise/fatigue. Negative for chills, diaphoresis and fever.   Eyes:        Wears glasses   Cardiovascular: Positive for chest pain and palpitations. Negative for leg swelling, orthopnea and paroxysmal nocturnal dyspnea.   Respiratory: Positive for cough and shortness of breath. Negative for hemoptysis.    Endocrine: Negative.  Negative for cold intolerance and heat intolerance.   Hematologic/Lymphatic: Bruises/bleeds easily.   Skin: Negative.  Negative for rash.   Musculoskeletal: Positive for back pain and myalgias.   Gastrointestinal: Positive for abdominal pain. Negative for constipation, diarrhea, nausea and  "vomiting.   Genitourinary: Negative.  Negative for dysuria and hematuria.   Neurological: Positive for headaches. Negative for dizziness, focal weakness and numbness.   Psychiatric/Behavioral: The patient is nervous/anxious.        Objective   Blood pressure 128/73, pulse 64, height 165.1 cm (65\"), weight 81.2 kg (179 lb), SpO2 99 %, not currently breastfeeding.  Body mass index is 29.79 kg/m².      Physical Exam   Constitutional: She is oriented to person, place, and time. She appears well-developed and well-nourished.   HENT:   Mouth/Throat: Oropharynx is clear and moist.   Eyes: EOM are normal. Pupils are equal, round, and reactive to light.   Neck: Neck supple. No JVD present. No tracheal deviation present. No thyromegaly present.   Cardiovascular: Normal rate, regular rhythm, S1 normal and S2 normal. Exam reveals no gallop, no S3, no S4 and no friction rub.   No murmur heard.  Pulses:       Radial pulses are 2+ on the right side, and 2+ on the left side.        Dorsalis pedis pulses are 1+ on the right side, and 1+ on the left side.        Posterior tibial pulses are 2+ on the right side, and 2+ on the left side.   Pulmonary/Chest: Effort normal and breath sounds normal.   Abdominal: Soft. Bowel sounds are normal. She exhibits no mass. There is no tenderness.   Musculoskeletal: Normal range of motion. She exhibits no edema.   Lymphadenopathy:     She has no cervical adenopathy.   Neurological: She is alert and oriented to person, place, and time.   Skin: Skin is warm and dry. No rash noted.   Psychiatric: She has a normal mood and affect.         Itzel Hagen   Regadenoson Stress Test With Myocardial Perfusion SPECT (Multi Study)   Order# [id number]   Reading physician: Dre Izquierdo MD Ordering physician: Violette Ariza DO Study date: 19   Patient Information     Patient Name  Itzel Hagen MRN  [id number] Sex  Female  (Age)  1955 (63 y.o.)   Interpretation Summary     · A " pharmacological stress test was performed using regadenoson with low-level exercise.  · Findings consistent with a normal ECG stress test.  · Myocardial perfusion imaging indicates a normal myocardial perfusion study with no evidence of ischemia.  · Normal LV cavity size. Normal LV wall motion noted.  · Left ventricular ejection fraction is hyperdynamic (Calculated EF > 70%).  · Impressions are consistent with a low risk study.         Lab Results   Component Value Date     07/29/2019    K 4.0 07/29/2019    CL 99 07/29/2019    CO2 25.7 07/29/2019    BUN 9 07/29/2019    CREATININE 0.84 07/29/2019    GLUCOSE 107 (H) 07/29/2019    CALCIUM 9.6 07/29/2019    AST 23 07/29/2019    ALT 15 07/29/2019    ALKPHOS 92 07/29/2019     No results found for: CKTOTAL  Lab Results   Component Value Date    WBC 11.03 (H) 07/29/2019    HGB 15.3 07/29/2019    HCT 45.0 07/29/2019     07/29/2019           ECG 12 Lead  Date/Time: 9/4/2019 3:46 PM  Performed by: Dre Izquierdo MD  Authorized by: Dre Izquierdo MD   Comparison: compared with previous ECG from 7/29/2019  Similar to previous ECG  Rhythm: sinus rhythm  Conduction: conduction normal  ST Segments: ST segments normal  T Waves: T waves normal                  Assessment/Plan :   Diagnosis Plan   1. Precordial pain     2. Angina, class III     3. Tobacco abuse     4. Family history of premature coronary artery disease         Recommendations:    Orders Placed This Encounter   Procedures   • ECG 12 Lead        1. Since patient continues to have recurrent class III anginal symptoms and with her long history of smoking and a strong family history for premature coronary artery disease and her being a professional , I would like to definitely rule out any underlying significant coronary artery disease with cardiac catheterization.  I discussed with her about the procedure of cardiac catheterization, potential risk of benefits and alternative methods of  management.  She expressed understanding of same and is wanting to proceed.  We will try to schedule this in the next couple of days.  2. Continue aspirin 81 mg daily.  Add Imdur 60 mg daily.  3. I have strongly emphasized to her about quitting to smoke and explained the risks of continued smoking.  She said she is going to see a pulmonologist who is trying to see if her insurance will pay for the nicotine patches.    Return in about 4 weeks (around 10/2/2019).    As always,Maude   I appreciate very much the opportunity to participate in the cardiovascular care of your patients. Please do not hesitate to call me with any questions with regards to Itzeljory Torresvidal's evaluation and management.       With Best Regards,        Dre Izquierdo MD, [facility]    Please note that portions of this note were completed with a voice recognition program.

## 2019-09-05 ENCOUNTER — OFFICE VISIT (OUTPATIENT)
Dept: GASTROENTEROLOGY | Facility: CLINIC | Age: 64
End: 2019-09-05

## 2019-09-05 VITALS
DIASTOLIC BLOOD PRESSURE: 75 MMHG | RESPIRATION RATE: 20 BRPM | HEART RATE: 60 BPM | BODY MASS INDEX: 29.82 KG/M2 | WEIGHT: 179 LBS | SYSTOLIC BLOOD PRESSURE: 155 MMHG | HEIGHT: 65 IN | TEMPERATURE: 96.5 F

## 2019-09-05 DIAGNOSIS — R07.9 CHEST PAIN, UNSPECIFIED TYPE: Chronic | ICD-10-CM

## 2019-09-05 DIAGNOSIS — R93.5 ABNORMAL CT OF THE ABDOMEN: Chronic | ICD-10-CM

## 2019-09-05 DIAGNOSIS — K85.90 ACUTE PANCREATITIS WITHOUT INFECTION OR NECROSIS, UNSPECIFIED PANCREATITIS TYPE: ICD-10-CM

## 2019-09-05 DIAGNOSIS — R12 HEARTBURN: Primary | Chronic | ICD-10-CM

## 2019-09-05 PROCEDURE — 99204 OFFICE O/P NEW MOD 45 MIN: CPT | Performed by: NURSE PRACTITIONER

## 2019-09-05 RX ORDER — RANITIDINE 150 MG/1
150 TABLET ORAL AS NEEDED
COMMUNITY
End: 2019-10-15

## 2019-09-05 NOTE — PATIENT INSTRUCTIONS
1. Antireflux measures: Avoid fried, fatty foods, alcohol, chocolate, coffee, tea,  soft drinks, peppermint and spearmint, spicy foods, tomatoes and tomato based foods, onion based foods, and smoking. Other antireflux measures include weight reduction if overweight, avoiding tight clothing around the abdomen, elevating the head of the bed 6 inches with blocks under the head board, and don't drink or eat before going to bed and avoid lying down immediately after meals.  2. Ranitidine 150 mg 1 po twice daily.  3. Continue to avoid alcohol.   4. Low fat, high fiber diet with liberal water intake.   5. Abdominal ultrasound.   6. The patient may call for results.   7. Possible EGD in the future. The patient is being evaluated by cardiology at this time.  8. Possible colonoscopy in the future. She has not had a screening colonoscopy in the past.  9. Follow up: 4 weeks

## 2019-09-05 NOTE — PROGRESS NOTES
Chief Complaint   Patient presents with   • Heartburn   • Pancreatitis     ?? on CT of Chest     The patient had gone to the emergency room in July 2019 with chest pain and nausea. She had a CT of the chest and was told she had an inflamed pancreas, but lipase level was mildly decreased. There is no history of cystic fibrosis. There is no history of pancreatitis in the past. The patient was having chest pain only, she denies any pain in the epigastric area or left upper quadrant. She was referred to cardiology for evaluation. When the chest pain improved, the nausea improved and she has not had further episodes of nausea. There is no history of vomiting. There is no history of alcohol use. There is no history of elevated liver enzymes. The patient has mildly elevated cholesterol levels, but has recently been started on statin medications.   She is having a heart cath tomorrow.    There is a long standing history of heartburn. The patient takes Ranitidine OTC as needed with reasonable control of heartburn. Heartburn is mild. Occasionally she will wake at night with reflux. The patient denies difficulty swallowing.     The patient denies recent change in bowel habits. There is no diarrhea or constipation. There is no history of abdominal pain. There is no history of overt GI bleed (hematemesis melena or hematochezia). The patient has lost about 15 pounds in the past 2 months. This is intentional. She has changed her diet and is eating healthier. There is no history of liver or pancreatic disease in the past. There is no family history of colon cancer. The patient has not had a colonoscopy in the past.    Heartburn   She complains of chest pain, coughing and nausea. She reports no abdominal pain. This is a chronic problem. Episode onset: over 5 years. The problem occurs occasionally. The problem has been waxing and waning. The heartburn duration is several minutes. The heartburn is located in the substernum. The  heartburn is of moderate intensity. The heartburn wakes her from sleep. The symptoms are aggravated by certain foods. Pertinent negatives include no fatigue. There are no known risk factors. She has tried a histamine-2 antagonist (as needed) for the symptoms. The treatment provided significant relief.   Pancreatitis   This is a new problem. Episode onset: July 2019. Progression since onset: the patient was not symptomatic at that time. Associated symptoms include arthralgias, chest pain, coughing, headaches and nausea. Pertinent negatives include no abdominal pain, chills, fatigue, fever, joint swelling, myalgias, rash or vomiting. Nothing aggravates the symptoms. She has tried nothing for the symptoms.     Review of Systems   Constitutional: Positive for unexpected weight change. Negative for appetite change, chills, fatigue and fever.   HENT: Negative for mouth sores, nosebleeds and trouble swallowing.    Eyes: Negative for discharge and redness.   Respiratory: Positive for cough and shortness of breath. Negative for apnea.    Cardiovascular: Positive for chest pain, palpitations and leg swelling.   Gastrointestinal: Positive for nausea. Negative for abdominal distention, abdominal pain, anal bleeding, blood in stool, constipation, diarrhea and vomiting.   Endocrine: Negative for cold intolerance, heat intolerance and polydipsia.   Genitourinary: Negative for dysuria, hematuria and urgency.   Musculoskeletal: Positive for arthralgias and back pain. Negative for joint swelling and myalgias.   Skin: Negative for rash.   Allergic/Immunologic: Positive for food allergies (coconut). Negative for immunocompromised state.   Neurological: Positive for headaches. Negative for dizziness, seizures and syncope.   Hematological: Negative for adenopathy. Does not bruise/bleed easily.   Psychiatric/Behavioral: Negative for dysphoric mood. The patient is not nervous/anxious and is not hyperactive.      Patient Active Problem List  "  Diagnosis   • Precordial pain   • Angina, class III (CMS/HCC)   • Tobacco abuse   • Family history of premature coronary artery disease   • Heartburn   • Acute pancreatitis without infection or necrosis   • Abnormal CT of the abdomen   • Chest pain     Past Medical History:   Diagnosis Date   • Back pain    • COPD (chronic obstructive pulmonary disease) (CMS/HCC)    • MI (myocardial infarction) (CMS/HCC) 2019    \"Being worked up for this\"     Past Surgical History:   Procedure Laterality Date   • APPENDECTOMY     • BACK SURGERY      Removed bone spurs/cleaned up the area   •  SECTION  ,    • CHOLECYSTECTOMY      + stones   • HYSTERECTOMY     • SHOULDER ACROMIOPLASTY       Family History   Problem Relation Age of Onset   • Heart disease Mother    • Heart disease Father    • Heart disease Brother    • Cirrhosis Neg Hx    • Liver cancer Neg Hx    • Liver disease Neg Hx    • Colon cancer Neg Hx    • Crohn's disease Neg Hx    • Ulcerative colitis Neg Hx    • Esophageal cancer Neg Hx    • Stomach cancer Neg Hx      Social History     Tobacco Use   • Smoking status: Current Every Day Smoker     Packs/day: 1.00     Types: Cigarettes     Start date:    • Smokeless tobacco: Never Used   Substance Use Topics   • Alcohol use: No     Frequency: Never     Comment: \"not in 30 years\"       Current Outpatient Medications:   •  albuterol sulfate  (90 Base) MCG/ACT inhaler, Inhale 2 puffs Every 4 (Four) Hours As Needed for Wheezing., Disp: , Rfl:   •  aspirin 81 MG EC tablet, Take 81 mg by mouth Daily., Disp: , Rfl:   •  isosorbide mononitrate (IMDUR) 60 MG 24 hr tablet, Take 1 tablet by mouth Every Morning., Disp: 30 tablet, Rfl: 11  •  Multiple Vitamins-Minerals (ALIVE WOMENS ENERGY PO), Take 1 tablet by mouth Daily., Disp: , Rfl:   •  Naproxen Sodium (ALEVE PO), Take  by mouth Daily., Disp: , Rfl:   •  nitroglycerin (NITROSTAT) 0.4 MG SL tablet, Place 0.4 mg under the tongue Every 5 " "(Five) Minutes As Needed for Chest Pain. Take no more than 3 doses in 15 minutes., Disp: , Rfl:   •  raNITIdine (ZANTAC) 150 MG tablet, Take 150 mg by mouth As Needed for Heartburn., Disp: , Rfl:   •  simvastatin (ZOCOR) 20 MG tablet, Take 20 mg by mouth Every Night., Disp: , Rfl:   •  tiotropium bromide-olodaterol (STIOLTO RESPIMAT) 2.5-2.5 MCG/ACT aerosol solution inhaler, Inhale 1 puff Daily., Disp: 4 g, Rfl: 6  •  nicotine (NICODERM CQ) 21 MG/24HR patch, Place 1 patch on the skin as directed by provider Daily., Disp: 28 patch, Rfl: 1    Allergies   Allergen Reactions   • Penicillins Anaphylaxis   • Coconut Swelling and Rash     Tongue swells   • Morphine Swelling     Blood pressure 155/75, pulse 60, temperature 96.5 °F (35.8 °C), resp. rate 20, height 165.1 cm (65\"), weight 81.2 kg (179 lb), not currently breastfeeding.    Physical Exam   Constitutional: She is oriented to person, place, and time. She appears well-developed and well-nourished. No distress.   HENT:   Head: Normocephalic and atraumatic.   Right Ear: Hearing and external ear normal.   Left Ear: Hearing and external ear normal.   Nose: Nose normal.   Mouth/Throat: Oropharynx is clear and moist and mucous membranes are normal. Mucous membranes are not pale, not dry and not cyanotic. No oral lesions. No oropharyngeal exudate.   Eyes: Conjunctivae and EOM are normal. Right eye exhibits no discharge. Left eye exhibits no discharge.   Neck: Trachea normal. Neck supple. No JVD present. No edema present. No thyroid mass and no thyromegaly present.   Cardiovascular: Normal rate, regular rhythm, S2 normal and normal heart sounds. Exam reveals no gallop, no S3 and no friction rub.   No murmur heard.  Pulmonary/Chest: Effort normal and breath sounds normal. No respiratory distress. She exhibits no tenderness.   Abdominal: Normal appearance and bowel sounds are normal. She exhibits no distension, no ascites and no mass. There is no splenomegaly or hepatomegaly. " There is no tenderness. There is no rigidity, no rebound and no guarding. No hernia.     Vascular Status -  Her right foot exhibits no edema. Her left foot exhibits no edema.  Lymphadenopathy:     She has no cervical adenopathy.        Left: No supraclavicular adenopathy present.   Neurological: She is alert and oriented to person, place, and time. She has normal strength. No cranial nerve deficit or sensory deficit.   Skin: No rash noted. She is not diaphoretic. No cyanosis. No pallor. Nails show no clubbing.   Psychiatric: She has a normal mood and affect.   Nursing note and vitals reviewed.  Stigmata of chronic liver disease:  None.  Asterixis:  None.    Laboratory Tests:   Upon review of records:     Dated 7/14/2019 total cholesterol 240, , , triglycerides 136, vitamin B1 45, vitamin B12 1034, vitamin D 49.5    Dated 7/29/2019 glucose 107 BUN 9 creatinine 0.84 sodium 139 potassium 4.0 chloride 99 CO2 25.7 calcium 9.6 albumin 3.84 ALT 15 AST 23 alkaline phosphatase 92 total bilirubin 0.6 WBC 11.03 hemoglobin 15.3 hematocrit 45.0 platelet count 214 MCV 94.1 lipase 12 d-dimer 0.59 BNP 72.1 troponin <0.010    Dated 8/5/2019 glucose 100 potassium 5.3 sodium 143 albumin 4.5 alkaline phosphatase 96 ALT 13 AST 19 total bilirubin 0.6 BUN 9 creatinine 0.62 WBC 11.3 hemoglobin 15.7 hematocrit 46.7 platelet count 224 MCV 84.2 BNP 92, EBV antibody IgM: Negative, hepatitis A IgM: Nonreactive, hepatitis B core IgM: Nonreactive, hepatitis B surface antigen: Nonreactive, hepatitis C antibody: Nonreactive, H. pylori breath test: Negative, TSH 2.19    Abdominal Imaging:  Upon review of records:    CT of chest pulmonary embolism with contrast dated 7/29/2019 reveals no evidence of pulmonary embolism.  Granulomatous changes with left side calcified mediastinal and hilar lymph nodes.  Severe centrilobular emphysema.  Multiple scattered calcified granulomas both lungs.  Mild cardiomegaly with mild coronary artery  calcifications.  No effusion.  Mediastinum with no masses.  No enlarged lymph nodes.  No fluid collections.  No pleural effusion.  No pleural mass or abnormal calcification.  No pneumothorax.  Major airways clear.  No intrinsic mass.  No evidence of aneurysm.  Stranding and inflammation surrounding the tail of the pancreas compatible with acute pancreatitis.  No acute bony abnormality.    Assessment:      ICD-10-CM ICD-9-CM   1. Heartburn R12 787.1   2. Acute pancreatitis without infection or necrosis, unspecified pancreatitis type K85.90 577.0   3. Abnormal CT of the abdomen R93.5 793.6   4. Chest pain, unspecified type R07.9 786.50       Discussion:  1. Long standing history of heartburn.  Controlled with ranitidine as needed.  Concerns for underlying Castillo's.  2. History of stranding and inflammation surrounding the tail of the pancreas compatible with acute pancreatitis per CT scan in July 2019.  The patient had nausea for 1 day during the episode of chest pain, but no vomiting.  She has not had further episodes of nausea.      Plan/  Patient Instructions   1. Antireflux measures: Avoid fried, fatty foods, alcohol, chocolate, coffee, tea,  soft drinks, peppermint and spearmint, spicy foods, tomatoes and tomato based foods, onion based foods, and smoking. Other antireflux measures include weight reduction if overweight, avoiding tight clothing around the abdomen, elevating the head of the bed 6 inches with blocks under the head board, and don't drink or eat before going to bed and avoid lying down immediately after meals.  2. Ranitidine 150 mg 1 po twice daily.  3. Continue to avoid alcohol.   4. Low fat, high fiber diet with liberal water intake.   5. Abdominal ultrasound.   6. The patient may call for results.   7. Possible EGD in the future. The patient is being evaluated by cardiology at this time.  8. Possible colonoscopy in the future. She has not had a screening colonoscopy in the past.  9. Follow up: 4  polo Linares, APRN

## 2019-09-06 ENCOUNTER — HOSPITAL ENCOUNTER (OUTPATIENT)
Facility: HOSPITAL | Age: 64
Setting detail: HOSPITAL OUTPATIENT SURGERY
Discharge: HOME | End: 2019-09-06
Attending: INTERNAL MEDICINE | Admitting: INTERNAL MEDICINE

## 2019-09-06 VITALS
BODY MASS INDEX: 28.49 KG/M2 | RESPIRATION RATE: 16 BRPM | HEART RATE: 53 BPM | SYSTOLIC BLOOD PRESSURE: 123 MMHG | TEMPERATURE: 98.3 F | DIASTOLIC BLOOD PRESSURE: 65 MMHG | OXYGEN SATURATION: 97 % | WEIGHT: 171 LBS | HEIGHT: 65 IN

## 2019-09-06 DIAGNOSIS — I20.9 ANGINA, CLASS III (HCC): ICD-10-CM

## 2019-09-06 LAB
ALBUMIN SERPL-MCNC: 3.98 G/DL (ref 3.5–5.2)
ALBUMIN/GLOB SERPL: 1.5 G/DL
ALP SERPL-CCNC: 92 U/L (ref 39–117)
ALT SERPL W P-5'-P-CCNC: 9 U/L (ref 1–33)
ANION GAP SERPL CALCULATED.3IONS-SCNC: 8.7 MMOL/L (ref 5–15)
AST SERPL-CCNC: 13 U/L (ref 1–32)
BASOPHILS # BLD AUTO: 0.03 10*3/MM3 (ref 0–0.2)
BASOPHILS NFR BLD AUTO: 0.3 % (ref 0–1.5)
BILIRUB SERPL-MCNC: 0.5 MG/DL (ref 0.2–1.2)
BUN BLD-MCNC: 11 MG/DL (ref 8–23)
BUN/CREAT SERPL: 13.1 (ref 7–25)
CALCIUM SPEC-SCNC: 9.3 MG/DL (ref 8.6–10.5)
CHLORIDE SERPL-SCNC: 106 MMOL/L (ref 98–107)
CO2 SERPL-SCNC: 29.3 MMOL/L (ref 22–29)
CREAT BLD-MCNC: 0.84 MG/DL (ref 0.57–1)
DEPRECATED RDW RBC AUTO: 47 FL (ref 37–54)
EOSINOPHIL # BLD AUTO: 0.4 10*3/MM3 (ref 0–0.4)
EOSINOPHIL NFR BLD AUTO: 4.1 % (ref 0.3–6.2)
ERYTHROCYTE [DISTWIDTH] IN BLOOD BY AUTOMATED COUNT: 13.6 % (ref 12.3–15.4)
GFR SERPL CREATININE-BSD FRML MDRD: 69 ML/MIN/1.73
GLOBULIN UR ELPH-MCNC: 2.7 GM/DL
GLUCOSE BLD-MCNC: 88 MG/DL (ref 65–99)
HCT VFR BLD AUTO: 42.2 % (ref 34–46.6)
HGB BLD-MCNC: 14 G/DL (ref 12–15.9)
IMM GRANULOCYTES # BLD AUTO: 0.02 10*3/MM3 (ref 0–0.05)
IMM GRANULOCYTES NFR BLD AUTO: 0.2 % (ref 0–0.5)
LYMPHOCYTES # BLD AUTO: 1.75 10*3/MM3 (ref 0.7–3.1)
LYMPHOCYTES NFR BLD AUTO: 17.9 % (ref 19.6–45.3)
MCH RBC QN AUTO: 31.5 PG (ref 26.6–33)
MCHC RBC AUTO-ENTMCNC: 33.2 G/DL (ref 31.5–35.7)
MCV RBC AUTO: 95 FL (ref 79–97)
MONOCYTES # BLD AUTO: 1.03 10*3/MM3 (ref 0.1–0.9)
MONOCYTES NFR BLD AUTO: 10.5 % (ref 5–12)
NEUTROPHILS # BLD AUTO: 6.54 10*3/MM3 (ref 1.7–7)
NEUTROPHILS NFR BLD AUTO: 67 % (ref 42.7–76)
PLATELET # BLD AUTO: 194 10*3/MM3 (ref 140–450)
PMV BLD AUTO: 12.5 FL (ref 6–12)
POTASSIUM BLD-SCNC: 4.1 MMOL/L (ref 3.5–5.2)
PROT SERPL-MCNC: 6.7 G/DL (ref 6–8.5)
RBC # BLD AUTO: 4.44 10*6/MM3 (ref 3.77–5.28)
SODIUM BLD-SCNC: 144 MMOL/L (ref 136–145)
WBC NRBC COR # BLD: 9.77 10*3/MM3 (ref 3.4–10.8)

## 2019-09-06 PROCEDURE — 25010000002 MIDAZOLAM PER 1 MG: Performed by: INTERNAL MEDICINE

## 2019-09-06 PROCEDURE — C1760 CLOSURE DEV, VASC: HCPCS | Performed by: INTERNAL MEDICINE

## 2019-09-06 PROCEDURE — 85025 COMPLETE CBC W/AUTO DIFF WBC: CPT | Performed by: INTERNAL MEDICINE

## 2019-09-06 PROCEDURE — C1894 INTRO/SHEATH, NON-LASER: HCPCS | Performed by: INTERNAL MEDICINE

## 2019-09-06 PROCEDURE — 25010000002 DIPHENHYDRAMINE PER 50 MG: Performed by: INTERNAL MEDICINE

## 2019-09-06 PROCEDURE — 0 IOPAMIDOL PER 1 ML: Performed by: INTERNAL MEDICINE

## 2019-09-06 PROCEDURE — 25010000002 HEPARIN (PORCINE) PER 1000 UNITS: Performed by: INTERNAL MEDICINE

## 2019-09-06 PROCEDURE — 80053 COMPREHEN METABOLIC PANEL: CPT | Performed by: INTERNAL MEDICINE

## 2019-09-06 PROCEDURE — C1769 GUIDE WIRE: HCPCS | Performed by: INTERNAL MEDICINE

## 2019-09-06 PROCEDURE — 93458 L HRT ARTERY/VENTRICLE ANGIO: CPT | Performed by: INTERNAL MEDICINE

## 2019-09-06 RX ORDER — DIPHENHYDRAMINE HYDROCHLORIDE 50 MG/ML
INJECTION INTRAMUSCULAR; INTRAVENOUS AS NEEDED
Status: DISCONTINUED | OUTPATIENT
Start: 2019-09-06 | End: 2019-09-06 | Stop reason: HOSPADM

## 2019-09-06 RX ORDER — MIDAZOLAM HYDROCHLORIDE 1 MG/ML
INJECTION INTRAMUSCULAR; INTRAVENOUS AS NEEDED
Status: DISCONTINUED | OUTPATIENT
Start: 2019-09-06 | End: 2019-09-06 | Stop reason: HOSPADM

## 2019-09-06 RX ORDER — SODIUM CHLORIDE 9 MG/ML
100 INJECTION, SOLUTION INTRAVENOUS CONTINUOUS
Status: DISCONTINUED | OUTPATIENT
Start: 2019-09-06 | End: 2019-09-06 | Stop reason: HOSPADM

## 2019-09-06 RX ORDER — HEPARIN SODIUM 1000 [USP'U]/ML
INJECTION, SOLUTION INTRAVENOUS; SUBCUTANEOUS AS NEEDED
Status: DISCONTINUED | OUTPATIENT
Start: 2019-09-06 | End: 2019-09-06 | Stop reason: HOSPADM

## 2019-09-06 RX ORDER — SODIUM CHLORIDE 9 MG/ML
INJECTION, SOLUTION INTRAVENOUS CONTINUOUS PRN
Status: COMPLETED | OUTPATIENT
Start: 2019-09-06 | End: 2019-09-06

## 2019-09-06 RX ORDER — LIDOCAINE HYDROCHLORIDE 20 MG/ML
INJECTION, SOLUTION INFILTRATION; PERINEURAL AS NEEDED
Status: DISCONTINUED | OUTPATIENT
Start: 2019-09-06 | End: 2019-09-06 | Stop reason: HOSPADM

## 2019-09-12 ENCOUNTER — HOSPITAL ENCOUNTER (OUTPATIENT)
Dept: ULTRASOUND IMAGING | Facility: HOSPITAL | Age: 64
Discharge: HOME | End: 2019-09-12
Admitting: NURSE PRACTITIONER

## 2019-09-12 ENCOUNTER — TELEPHONE (OUTPATIENT)
Dept: PULMONOLOGY | Facility: CLINIC | Age: 64
End: 2019-09-12

## 2019-09-12 DIAGNOSIS — K85.90 ACUTE PANCREATITIS WITHOUT INFECTION OR NECROSIS, UNSPECIFIED PANCREATITIS TYPE: ICD-10-CM

## 2019-09-12 DIAGNOSIS — R93.5 ABNORMAL CT OF THE ABDOMEN: Chronic | ICD-10-CM

## 2019-09-12 PROCEDURE — 76700 US EXAM ABDOM COMPLETE: CPT

## 2019-09-12 PROCEDURE — 76705 ECHO EXAM OF ABDOMEN: CPT | Performed by: RADIOLOGY

## 2019-09-13 ENCOUNTER — TELEPHONE (OUTPATIENT)
Dept: GASTROENTEROLOGY | Facility: CLINIC | Age: 64
End: 2019-09-13

## 2019-09-13 NOTE — TELEPHONE ENCOUNTER
I've called patient about test results per DEWAYNE Sutherland.  There was no answer, I've left a VM.

## 2019-09-13 NOTE — TELEPHONE ENCOUNTER
----- Message from DEWAYNE Yates sent at 9/12/2019  5:01 PM EDT -----  Let her know her ultrasound was ok.

## 2019-09-19 ENCOUNTER — OFFICE VISIT (OUTPATIENT)
Dept: CARDIOLOGY | Facility: CLINIC | Age: 64
End: 2019-09-19

## 2019-09-19 VITALS
HEART RATE: 76 BPM | DIASTOLIC BLOOD PRESSURE: 66 MMHG | RESPIRATION RATE: 16 BRPM | SYSTOLIC BLOOD PRESSURE: 111 MMHG | BODY MASS INDEX: 29.49 KG/M2 | HEIGHT: 65 IN | WEIGHT: 177 LBS

## 2019-09-19 DIAGNOSIS — R07.9 CHEST PAIN, UNSPECIFIED TYPE: Primary | Chronic | ICD-10-CM

## 2019-09-19 DIAGNOSIS — Z72.0 TOBACCO ABUSE: ICD-10-CM

## 2019-09-19 PROCEDURE — 99213 OFFICE O/P EST LOW 20 MIN: CPT | Performed by: INTERNAL MEDICINE

## 2019-09-19 NOTE — PROGRESS NOTES
Maude Snow APRN  Itzel Hagen  1955 09/19/2019    Patient Active Problem List   Diagnosis   • Precordial pain   • Angina, class III (CMS/HCC)   • Tobacco abuse   • Family history of premature coronary artery disease   • Heartburn   • Acute pancreatitis without infection or necrosis   • Abnormal CT of the abdomen   • Chest pain       Dear Maude Snow, DEWAYNE:    Subjective     Itzel Hagen is a 63 y.o. female with the problems as listed above, presents    Chief complaint: Follow-up of chest pains.    History of Present Illness: Ms. Perez is a pleasant 61-year-old  female who was recently evaluated for complaints of chest pains.  She underwent cardiac catheterization that revealed mild coronary artery disease and normal LV systolic function.  She is here for regular cardiology follow-up.  On today's visit she says her chest pains are better although she still has some chest pains.  She has a long history of smoking but says she has been trying to cut back from smoking about 2 packs a day to about a pack a day currently.        Allergies   Allergen Reactions   • Penicillins Anaphylaxis   • Coconut Swelling and Rash     Tongue swells   • Morphine Swelling   :      Current Outpatient Medications:   •  albuterol sulfate  (90 Base) MCG/ACT inhaler, Inhale 2 puffs Every 4 (Four) Hours As Needed for Wheezing., Disp: , Rfl:   •  aspirin 81 MG EC tablet, Take 81 mg by mouth Daily., Disp: , Rfl:   •  isosorbide mononitrate (IMDUR) 60 MG 24 hr tablet, Take 1 tablet by mouth Every Morning., Disp: 30 tablet, Rfl: 11  •  Multiple Vitamins-Minerals (ALIVE WOMENS ENERGY PO), Take 1 tablet by mouth Daily., Disp: , Rfl:   •  Naproxen Sodium (ALEVE PO), Take  by mouth Daily., Disp: , Rfl:   •  nitroglycerin (NITROSTAT) 0.4 MG SL tablet, Place 0.4 mg under the tongue Every 5 (Five) Minutes As Needed for Chest Pain. Take no more than 3 doses in 15 minutes., Disp: , Rfl:   •   "raNITIdine (ZANTAC) 150 MG tablet, Take 150 mg by mouth As Needed for Heartburn., Disp: , Rfl:   •  simvastatin (ZOCOR) 20 MG tablet, Take 20 mg by mouth Every Night., Disp: , Rfl:   •  tiotropium bromide-olodaterol (STIOLTO RESPIMAT) 2.5-2.5 MCG/ACT aerosol solution inhaler, Inhale 1 puff Daily., Disp: 4 g, Rfl: 6  •  Vitamins C E (VITAMIN C & E COMBINATION PO), Take  by mouth Daily., Disp: , Rfl:   •  nicotine (NICODERM CQ) 21 MG/24HR patch, Place 1 patch on the skin as directed by provider Daily., Disp: 28 patch, Rfl: 1      The following portions of the patient's history were reviewed and updated as appropriate: allergies, current medications, past family history, past medical history, past social history, past surgical history and problem list.    Social History     Tobacco Use   • Smoking status: Current Every Day Smoker     Packs/day: 1.50     Types: Cigarettes     Start date: 1971   • Smokeless tobacco: Never Used   Substance Use Topics   • Alcohol use: No     Frequency: Never     Comment: \"not in 30 years\"   • Drug use: No       Review of Systems   Cardiovascular: Positive for palpitations. Negative for chest pain, leg swelling and syncope.   Respiratory: Positive for shortness of breath.    Neurological: Positive for dizziness.       Objective   Vitals:    09/19/19 1222   BP: 111/66   BP Location: Right arm   Pulse: 76   Resp: 16   Weight: 80.3 kg (177 lb)   Height: 165.1 cm (65\")     Body mass index is 29.45 kg/m².        Physical Exam   Constitutional: She is oriented to person, place, and time. She appears well-developed and well-nourished.   HENT:   Mouth/Throat: Oropharynx is clear and moist.   Eyes: EOM are normal. Pupils are equal, round, and reactive to light.   Neck: Neck supple. No JVD present. No tracheal deviation present. No thyromegaly present.   Cardiovascular: Normal rate, regular rhythm, S1 normal and S2 normal. Exam reveals no gallop and no friction rub.   No murmur " heard.  Pulmonary/Chest: Effort normal and breath sounds normal.   Abdominal: Soft. Bowel sounds are normal. She exhibits no mass. There is no tenderness.   Musculoskeletal: Normal range of motion. She exhibits no edema.   Lymphadenopathy:     She has no cervical adenopathy.   Neurological: She is alert and oriented to person, place, and time.   Skin: Skin is warm and dry. No rash noted.   Psychiatric: She has a normal mood and affect.       Lab Results   Component Value Date     09/06/2019    K 4.1 09/06/2019     09/06/2019    CO2 29.3 (H) 09/06/2019    BUN 11 09/06/2019    CREATININE 0.84 09/06/2019    GLUCOSE 88 09/06/2019    CALCIUM 9.3 09/06/2019    AST 13 09/06/2019    ALT 9 09/06/2019    ALKPHOS 92 09/06/2019     No results found for: CKTOTAL  Lab Results   Component Value Date    WBC 9.77 09/06/2019    HGB 14.0 09/06/2019    HCT 42.2 09/06/2019     09/06/2019     Conclusion     Procedure performed: Left heart catheterization with selective coronary angiography and LV angiography.  Date of procedure: 9/6/2009  Indication for the procedure: Ms. Hagen is a 63-year-old  female with recent class III-IV anginal symptoms and multiple risk factors for coronary disease.  She is also a professional .  In view of continued anginal symptoms, she was given option for the cardiac evaluation with cardiac catheterization to rule out any underlying significant coronary artery disease and to help with decision regarding further management.  The procedure of cardiac catheterization, potential risks, benefits and alternate methods of management have been explained to Ms. Cuevas and she expressed understanding of the same and is wanting to proceed.  Informed consent was obtained.  A timeout was performed prior to starting the procedure.     Procedure performed by: Dre Izquierdo MD [facility]     Results:  Hemodynamics:             Coronary angiograms:     Left main coronary artery  is normal and bifurcates into medium-sized left anterior descending and left circumflex system in usual fashion.     Left anterior descending coronary artery is a medium caliber vessel giving rise to a medium-sized diagonal branch from the proximal segment with minimal plaque disease.     Left circumflex coronary artery gives rise to a medium size obtuse marginal branch from the mid segment and a small posterolateral branch distally.  There is overall mild plaquing is noted in the left circumflex system.     Right coronary artery is dominant for posterior circulation with mild eccentric plaque causing about 20% stenosis proximally.  Rest of the RCA including medium size PDA appears angiographically normal.     LV angiogram was performed in BROWN projection.  It reveals normal left ventricle chamber size, wall motion and systolic function with an estimated LV ejection fraction about 65 to 70%.  There is no evidence of mitral valve prolapse or regurgitation noted.  There is no pericardial or valvular calcification noted.     Conclusion comments: Ms. Hagen is a 63-year-old  female with multiple risk factors for coronary disease and recent class III anginal symptoms.  Her cardiac catheterization reveals:     1. Overall mild coronary artery disease with right dominant system.  2. Normal LV chamber size, wall motion and systolic function with an estimated LV ejection fraction of about 65 to 70%.        Recommendations: In view of mild coronary artery disease, we will continue to emphasize on risk factor modification as needed for now and perhaps look into noncardiac causes for symptoms should they persist.     Dre Izquierdo MD [facility]             Procedures    Assessment/Plan    Diagnosis Plan   1. Chest pain, unspecified type, probably noncardiac.    2. Mild coronary artery disease on recent coronary angiography.    3. Tobacco abuse          Recommendations:  1. I have reviewed the coronary angiography  findings again with the patient.  2. I have reemphasized for her to quit smoking.  3. Continue with low-dose aspirin and statin.  4. Check fasting lipid panel and CMP.    Return in about 4 months (around 1/19/2020).    As always, I appreciate very much the opportunity to participate in the cardiovascular care of your patients.      With Best Regards,    Dre Izquierdo MD, [facility]    Dragon disclaimer:  Much of this encounter note is an electronic transcription/translation of spoken language to printed text. The electronic translation of spoken language may permit erroneous, or at times, nonsensical words or phrases to be inadvertently transcribed; Although I have reviewed the note for such errors, some may still exist.

## 2019-09-24 ENCOUNTER — APPOINTMENT (OUTPATIENT)
Dept: PHARMACY | Facility: HOSPITAL | Age: 64
End: 2019-09-24

## 2019-09-27 ENCOUNTER — HOSPITAL ENCOUNTER (OUTPATIENT)
Dept: RESPIRATORY THERAPY | Facility: HOSPITAL | Age: 64
Discharge: HOME | End: 2019-09-27
Admitting: NURSE PRACTITIONER

## 2019-09-27 VITALS — HEART RATE: 58 BPM | OXYGEN SATURATION: 96 % | RESPIRATION RATE: 16 BRPM

## 2019-09-27 DIAGNOSIS — J41.0 SIMPLE CHRONIC BRONCHITIS (HCC): ICD-10-CM

## 2019-09-27 PROCEDURE — 94799 UNLISTED PULMONARY SVC/PX: CPT

## 2019-09-27 PROCEDURE — 94727 GAS DIL/WSHOT DETER LNG VOL: CPT | Performed by: INTERNAL MEDICINE

## 2019-09-27 PROCEDURE — 94060 EVALUATION OF WHEEZING: CPT

## 2019-09-27 PROCEDURE — 94729 DIFFUSING CAPACITY: CPT | Performed by: INTERNAL MEDICINE

## 2019-09-27 PROCEDURE — 94729 DIFFUSING CAPACITY: CPT

## 2019-09-27 PROCEDURE — 94060 EVALUATION OF WHEEZING: CPT | Performed by: INTERNAL MEDICINE

## 2019-09-27 PROCEDURE — 94727 GAS DIL/WSHOT DETER LNG VOL: CPT

## 2019-09-27 PROCEDURE — 94640 AIRWAY INHALATION TREATMENT: CPT

## 2019-09-27 RX ORDER — ALBUTEROL SULFATE 2.5 MG/3ML
2.5 SOLUTION RESPIRATORY (INHALATION) ONCE
Status: COMPLETED | OUTPATIENT
Start: 2019-09-27 | End: 2019-09-27

## 2019-09-27 RX ADMIN — ALBUTEROL SULFATE 2.5 MG: 2.5 SOLUTION RESPIRATORY (INHALATION) at 12:30

## 2019-09-30 ENCOUNTER — DISEASE STATE MANAGEMENT VISIT (OUTPATIENT)
Dept: PHARMACY | Facility: HOSPITAL | Age: 64
End: 2019-09-30

## 2019-09-30 RX ORDER — BUPROPION HYDROCHLORIDE 150 MG/1
150 TABLET, EXTENDED RELEASE ORAL 2 TIMES DAILY
Qty: 60 TABLET | Refills: 0 | Status: SHIPPED | OUTPATIENT
Start: 2019-09-30 | End: 2019-10-15 | Stop reason: HOSPADM

## 2019-10-01 ENCOUNTER — TELEPHONE (OUTPATIENT)
Dept: PULMONOLOGY | Facility: CLINIC | Age: 64
End: 2019-10-01

## 2019-10-01 DIAGNOSIS — Z99.81 DEPENDENCE ON NOCTURNAL OXYGEN THERAPY: Primary | ICD-10-CM

## 2019-10-01 NOTE — TELEPHONE ENCOUNTER
Sharon Rhodes, DEWAYNE Johnston, Boby Celaya, MA             Will you also let her know that she does need oxygen when she is sleeping. I placed an order for it.

## 2019-10-01 NOTE — PROGRESS NOTES
"Tobacco Cessation Pharmacy Note    HPI:     Years of tobacco use: 40+  Average number of cigarettes or other tobacco/nicotine products per day:1.5 ppd  Number of Previous Quit Attempts:    Previous tobacco cessation medication attempts, failures, intolerances: States success with bupropion in the past.   Other recreational substance use: none  1st cigarette of the day smoked: first thing in the morning      Past Medical History:   Diagnosis Date   • Back pain    • COPD (chronic obstructive pulmonary disease) (CMS/[facility])    • MI (myocardial infarction) (CMS/[facility]) 07/2019    \"Being worked up for this\"     Allergies   Allergen Reactions   • Penicillins Anaphylaxis   • Coconut Swelling and Rash     Tongue swells   • Morphine Swelling     Current Outpatient Medications   Medication Sig Dispense Refill   • albuterol sulfate  (90 Base) MCG/ACT inhaler Inhale 2 puffs Every 4 (Four) Hours As Needed for Wheezing.     • aspirin 81 MG EC tablet Take 81 mg by mouth Daily.     • buPROPion SR (WELLBUTRIN SR) 150 MG 12 hr tablet Take 1 tablet by mouth 2 (Two) Times a Day. Take 1 tablet every morning x 3 days, then increase to 1 tablet twice a day 60 tablet 0   • isosorbide mononitrate (IMDUR) 60 MG 24 hr tablet Take 1 tablet by mouth Every Morning. 30 tablet 11   • Multiple Vitamins-Minerals (ALIVE WOMENS ENERGY PO) Take 1 tablet by mouth Daily.     • Naproxen Sodium (ALEVE PO) Take  by mouth Daily.     • nicotine (NICODERM CQ) 21 MG/24HR patch Place 1 patch on the skin as directed by provider Daily. 28 patch 1   • nitroglycerin (NITROSTAT) 0.4 MG SL tablet Place 0.4 mg under the tongue Every 5 (Five) Minutes As Needed for Chest Pain. Take no more than 3 doses in 15 minutes.     • raNITIdine (ZANTAC) 150 MG tablet Take 150 mg by mouth As Needed for Heartburn.     • simvastatin (ZOCOR) 20 MG tablet Take 20 mg by mouth Every Night.     • tiotropium bromide-olodaterol (STIOLTO RESPIMAT) 2.5-2.5 MCG/ACT aerosol solution inhaler " Inhale 1 puff Daily. 4 g 6   • Vitamins C E (VITAMIN C & E COMBINATION PO) Take  by mouth Daily.       No current facility-administered medications for this visit.        Assessment:     Readiness to quit:     Stage 2: Ready to quit in the next month    Plan:     1. Quit Date Planned: 2 weeks  2. Behavioral options for quitting reviewed  3. Provided additional resources to help with tobacco cessation. Provided patient with benefits of quitting handout.    4. Medication options reviewed.  Risks, benefits, and side effects discussed and questions answered.   5. Education was provided regarding: motivation to cease tobacco use, drug information on the medication dispensed (including directions for use and adverse effects), nicotine withdrawal symptoms, lifestyle modifications and techniques to prevent relapse.   6. Pursuant to the [place] Board of Pharmacy Approved Protocol,  will order: bupropion SR 150mg daily x 3 days, then BID  7. Will notify the patient's PCP, Maude Snow  8. Will follow up with patient in 2 weeks      Keren Cortez RPH  10/01/19  8:56 AM

## 2019-10-02 ENCOUNTER — OFFICE VISIT (OUTPATIENT)
Dept: GASTROENTEROLOGY | Facility: CLINIC | Age: 64
End: 2019-10-02

## 2019-10-02 VITALS
DIASTOLIC BLOOD PRESSURE: 80 MMHG | WEIGHT: 178 LBS | SYSTOLIC BLOOD PRESSURE: 153 MMHG | HEIGHT: 65 IN | BODY MASS INDEX: 29.66 KG/M2 | RESPIRATION RATE: 16 BRPM | TEMPERATURE: 98.5 F | HEART RATE: 65 BPM

## 2019-10-02 DIAGNOSIS — R12 HEARTBURN: Primary | Chronic | ICD-10-CM

## 2019-10-02 DIAGNOSIS — R07.9 CHEST PAIN, UNSPECIFIED TYPE: Chronic | ICD-10-CM

## 2019-10-02 DIAGNOSIS — Z12.11 COLON CANCER SCREENING: ICD-10-CM

## 2019-10-02 PROCEDURE — 99214 OFFICE O/P EST MOD 30 MIN: CPT | Performed by: NURSE PRACTITIONER

## 2019-10-02 RX ORDER — SODIUM CHLORIDE 9 MG/ML
70 INJECTION, SOLUTION INTRAVENOUS CONTINUOUS PRN
Status: CANCELLED | OUTPATIENT
Start: 2019-10-02

## 2019-10-02 NOTE — H&P (VIEW-ONLY)
"Chief Complaint   Patient presents with   • Follow-up     The patient was evaluated in the emergency room in July 2019 for chest pain. She was told to have pancreatitis per CT scan, no elevated lipase levels. She has been evaluated by cardiology and underwent cardiac catheterization. She was told she had \"minor plaque\", but no blockages. She did not have stents. Chest pain was thought to be noncardiac in nature per records. She is only on baby aspirin, no blood thinners.She follows up with cardiology in 4 months. The chest pain has significantly improved.    There is a long standing history of heartburn. The patient takes Ranitidine OTC as needed with reasonable control of heartburn. Heartburn is mild. Occasionally she will wake at night with reflux. The patient denies difficulty swallowing.     The patient denies recent change in bowel habits. There is no diarrhea or constipation. There is no history of abdominal pain. There is no history of overt GI bleed (hematemesis melena or hematochezia). The patient denies nausea or vomiting. There is no history of liver disease in the past. There is no family history of colon cancer. The patient has not had a colonoscopy in the past.    Heartburn   She complains of heartburn. She reports no abdominal pain, no chest pain, no coughing or no nausea. This is a chronic problem. Episode onset: over 5 years ago. The problem occurs occasionally. The problem has been unchanged. The heartburn duration is an hour. The heartburn is located in the substernum. The heartburn is of mild intensity. The heartburn wakes her from sleep. Nothing aggravates the symptoms. Associated symptoms include fatigue. There are no known risk factors. She has tried a histamine-2 antagonist for the symptoms. The treatment provided significant relief. Past procedures include an abdominal ultrasound (2019).     Review of Systems   Constitutional: Positive for fatigue. Negative for appetite change, chills, fever " "and unexpected weight change.   HENT: Negative for mouth sores, nosebleeds and trouble swallowing.    Eyes: Negative for discharge and redness.   Respiratory: Negative for apnea, cough and shortness of breath.    Cardiovascular: Negative for chest pain, palpitations and leg swelling.   Gastrointestinal: Positive for heartburn. Negative for abdominal distention, abdominal pain, anal bleeding, blood in stool, constipation, diarrhea, nausea and vomiting.   Endocrine: Negative for cold intolerance, heat intolerance and polydipsia.   Genitourinary: Negative for dysuria, hematuria and urgency.   Musculoskeletal: Positive for arthralgias and back pain. Negative for joint swelling and myalgias.   Skin: Negative for rash.   Allergic/Immunologic: Positive for food allergies. Negative for immunocompromised state.        Coconut   Neurological: Negative for dizziness, seizures, syncope and headaches.   Hematological: Negative for adenopathy. Does not bruise/bleed easily.   Psychiatric/Behavioral: Negative for dysphoric mood. The patient is nervous/anxious. The patient is not hyperactive.      Patient Active Problem List   Diagnosis   • Precordial pain   • Angina, class III (CMS/[facility])   • Tobacco abuse   • Family history of premature coronary artery disease   • Heartburn   • Acute pancreatitis without infection or necrosis   • Abnormal CT of the abdomen   • Chest pain     Past Medical History:   Diagnosis Date   • Back pain    • COPD (chronic obstructive pulmonary disease) (CMS/HCC)    • MI (myocardial infarction) (CMS/[facility]) 2019    \"Being worked up for this\"     Past Surgical History:   Procedure Laterality Date   • APPENDECTOMY     • BACK SURGERY      Removed bone spurs/cleaned up the area   • CARDIAC CATHETERIZATION N/A 2019    Procedure: Left Heart Cath;  Surgeon: Dre Izquierdo MD;  Location: [facility] CATH INVASIVE LOC[facility];  Service: Cardiology   •  SECTION  ,    • CHOLECYSTECTOMY      + " "stones   • HYSTERECTOMY     • SHOULDER ACROMIOPLASTY  1998     Family History   Problem Relation Age of Onset   • Heart disease Mother    • Heart disease Father    • Heart disease Brother    • Cirrhosis Neg Hx    • Liver cancer Neg Hx    • Liver disease Neg Hx    • Colon cancer Neg Hx    • Crohn's disease Neg Hx    • Ulcerative colitis Neg Hx    • Esophageal cancer Neg Hx    • Stomach cancer Neg Hx      Social History     Tobacco Use   • Smoking status: Current Every Day Smoker     Packs/day: 1.50     Types: Cigarettes     Start date: 1971   • Smokeless tobacco: Never Used   Substance Use Topics   • Alcohol use: No     Frequency: Never     Comment: \"not in 30 years\"       Current Outpatient Medications:   •  albuterol sulfate  (90 Base) MCG/ACT inhaler, Inhale 2 puffs Every 4 (Four) Hours As Needed for Wheezing., Disp: , Rfl:   •  aspirin 81 MG EC tablet, Take 81 mg by mouth Daily., Disp: , Rfl:   •  buPROPion SR (WELLBUTRIN SR) 150 MG 12 hr tablet, Take 1 tablet by mouth 2 (Two) Times a Day. Take 1 tablet every morning x 3 days, then increase to 1 tablet twice a day, Disp: 60 tablet, Rfl: 0  •  isosorbide mononitrate (IMDUR) 60 MG 24 hr tablet, Take 1 tablet by mouth Every Morning., Disp: 30 tablet, Rfl: 11  •  Multiple Vitamins-Minerals (ALIVE WOMENS ENERGY PO), Take 1 tablet by mouth Daily., Disp: , Rfl:   •  Naproxen Sodium (ALEVE PO), Take  by mouth Daily., Disp: , Rfl:   •  raNITIdine (ZANTAC) 150 MG tablet, Take 150 mg by mouth As Needed for Heartburn., Disp: , Rfl:   •  simvastatin (ZOCOR) 20 MG tablet, Take 20 mg by mouth Every Night., Disp: , Rfl:   •  tiotropium bromide-olodaterol (STIOLTO RESPIMAT) 2.5-2.5 MCG/ACT aerosol solution inhaler, Inhale 1 puff Daily., Disp: 4 g, Rfl: 6  •  Vitamins C E (VITAMIN C & E COMBINATION PO), Take  by mouth Daily., Disp: , Rfl:   •  nitroglycerin (NITROSTAT) 0.4 MG SL tablet, Place 0.4 mg under the tongue Every 5 (Five) Minutes As Needed for Chest Pain. Take no " "more than 3 doses in 15 minutes., Disp: , Rfl:     Allergies   Allergen Reactions   • Penicillins Anaphylaxis   • Coconut Swelling and Rash     Tongue swells   • Morphine Swelling     /80   Pulse 65   Temp 98.5 °F (36.9 °C)   Resp 16   Ht 165.1 cm (65\")   Wt 80.7 kg (178 lb)   LMP  (LMP Unknown)   BMI 29.62 kg/m²     Physical Exam   Constitutional: She is oriented to person, place, and time. She appears well-developed and well-nourished. No distress.   HENT:   Head: Normocephalic and atraumatic.   Right Ear: Hearing and external ear normal.   Left Ear: Hearing and external ear normal.   Nose: Nose normal.   Mouth/Throat: Oropharynx is clear and moist and mucous membranes are normal. Mucous membranes are not pale, not dry and not cyanotic. No oral lesions. No oropharyngeal exudate.   Eyes: Conjunctivae and EOM are normal. Right eye exhibits no discharge. Left eye exhibits no discharge.   Neck: Trachea normal. Neck supple. No JVD present. No edema present. No thyroid mass and no thyromegaly present.   Cardiovascular: Normal rate, regular rhythm, S2 normal and normal heart sounds. Exam reveals no gallop, no S3 and no friction rub.   No murmur heard.  Pulmonary/Chest: Effort normal and breath sounds normal. No respiratory distress. She exhibits no tenderness.   Abdominal: Normal appearance and bowel sounds are normal. She exhibits no distension, no ascites and no mass. There is no splenomegaly or hepatomegaly. There is no tenderness. There is no rigidity, no rebound and no guarding. No hernia.     Vascular Status -  Her right foot exhibits no edema. Her left foot exhibits no edema.  Lymphadenopathy:     She has no cervical adenopathy.        Left: No supraclavicular adenopathy present.   Neurological: She is alert and oriented to person, place, and time. She has normal strength. No cranial nerve deficit or sensory deficit.   Skin: No rash noted. She is not diaphoretic. No cyanosis. No pallor. Nails show no " clubbing.   Psychiatric: She has a normal mood and affect.   Nursing note and vitals reviewed.  Stigmata of chronic liver disease:  None.  Asterixis:  None.    Laboratory Tests:   Upon review of records:     Dated 7/14/2019 total cholesterol 240, , , triglycerides 136, vitamin B1 45, vitamin B12 1034, vitamin D 49.5     Dated 7/29/2019 glucose 107 BUN 9 creatinine 0.84 sodium 139 potassium 4.0 chloride 99 CO2 25.7 calcium 9.6 albumin 3.84 ALT 15 AST 23 alkaline phosphatase 92 total bilirubin 0.6 WBC 11.03 hemoglobin 15.3 hematocrit 45.0 platelet count 214 MCV 94.1 lipase 12 d-dimer 0.59 BNP 72.1 troponin <0.010     Dated 8/5/2019 glucose 100 potassium 5.3 sodium 143 albumin 4.5 alkaline phosphatase 96 ALT 13 AST 19 total bilirubin 0.6 BUN 9 creatinine 0.62 WBC 11.3 hemoglobin 15.7 hematocrit 46.7 platelet count 224 MCV 84.2 BNP 92, EBV antibody IgM: Negative, hepatitis A IgM: Nonreactive, hepatitis B core IgM: Nonreactive, hepatitis B surface antigen: Nonreactive, hepatitis C antibody: Nonreactive, H. pylori breath test: Negative, TSH 2.19    Dated 9/6/2019 glucose 88 BUN 11 creatinine 0.84 sodium 144 potassium 4.1 chloride 106 CO2 29.3 calcium 9.3 albumin 3.98 ALT 9 AST 13 alkaline phosphatase 92 total bilirubin 0.5 WBC 9.77 hemoglobin 14.0 hematocrit 42.2 platelet count 194 MCV 95.0     Abdominal Imaging:  Upon review of records:     CT of chest pulmonary embolism with contrast dated 7/29/2019 reveals no evidence of pulmonary embolism.  Granulomatous changes with left side calcified mediastinal and hilar lymph nodes.  Severe centrilobular emphysema.  Multiple scattered calcified granulomas both lungs.  Mild cardiomegaly with mild coronary artery calcifications.  No effusion.  Mediastinum with no masses.  No enlarged lymph nodes.  No fluid collections.  No pleural effusion.  No pleural mass or abnormal calcification.  No pneumothorax.  Major airways clear.  No intrinsic mass.  No evidence of aneurysm.   Stranding and inflammation surrounding the tail of the pancreas compatible with acute pancreatitis.  No acute bony abnormality.    Abdominal ultrasound dated 9/12/2019 reveals visualized pancreas is unremarkable.  The imaged portion of the abdominal aorta is nondilated.  The liver is homogenous.  There is no intrahepatic ductal dilatation or focal hepatic mass.  Hepatic flow is hepatopetal.  The imaged portion of the hepatic vessels and inferior vena cava are patent.  The common bile duct is normal, measuring 3.96 mm.  The kidneys demonstrate no evidence of hydronephrosis or solid renal mass.  The spleen is homogenous and measures 7.77 cm.    Assessment:      ICD-10-CM ICD-9-CM   1. Heartburn R12 787.1   2. Chest pain, unspecified type R07.9 786.50   3. Colon cancer screening Z12.11 V76.51     Plan/  Patient Instructions   1. Antireflux measures: Avoid fried, fatty foods, alcohol, chocolate, coffee, tea,  soft drinks, peppermint and spearmint, spicy foods, tomatoes and tomato based foods, onion based foods, and smoking. Other antireflux measures include weight reduction if overweight, avoiding tight clothing around the abdomen, elevating the head of the bed 6 inches with blocks under the head board, and don't drink or eat before going to bed and avoid lying down immediately after meals.  2. Ranitidine 150 mg 1 po twice daily.  3. High fiber, low fat diet with liberal water intake.   4. The patient declines colonoscopy at this time as she will be going back to work soon as a  and will not be in town.  5. Upper endoscopy-EGD: Description of the procedure, risks, benefits, alternatives and options, including nonoperative options, were discussed with the patient in detail. The patient understands and wishes to proceed.     DEWAYNE Kim

## 2019-10-02 NOTE — PROGRESS NOTES
"Chief Complaint   Patient presents with   • Follow-up     The patient was evaluated in the emergency room in July 2019 for chest pain. She was told to have pancreatitis per CT scan, no elevated lipase levels. She has been evaluated by cardiology and underwent cardiac catheterization. She was told she had \"minor plaque\", but no blockages. She did not have stents. Chest pain was thought to be noncardiac in nature per records. She is only on baby aspirin, no blood thinners.She follows up with cardiology in 4 months. The chest pain has significantly improved.    There is a long standing history of heartburn. The patient takes Ranitidine OTC as needed with reasonable control of heartburn. Heartburn is mild. Occasionally she will wake at night with reflux. The patient denies difficulty swallowing.     The patient denies recent change in bowel habits. There is no diarrhea or constipation. There is no history of abdominal pain. There is no history of overt GI bleed (hematemesis melena or hematochezia). The patient denies nausea or vomiting. There is no history of liver disease in the past. There is no family history of colon cancer. The patient has not had a colonoscopy in the past.    Heartburn   She complains of heartburn. She reports no abdominal pain, no chest pain, no coughing or no nausea. This is a chronic problem. Episode onset: over 5 years ago. The problem occurs occasionally. The problem has been unchanged. The heartburn duration is an hour. The heartburn is located in the substernum. The heartburn is of mild intensity. The heartburn wakes her from sleep. Nothing aggravates the symptoms. Associated symptoms include fatigue. There are no known risk factors. She has tried a histamine-2 antagonist for the symptoms. The treatment provided significant relief. Past procedures include an abdominal ultrasound (2019).     Review of Systems   Constitutional: Positive for fatigue. Negative for appetite change, chills, fever " "and unexpected weight change.   HENT: Negative for mouth sores, nosebleeds and trouble swallowing.    Eyes: Negative for discharge and redness.   Respiratory: Negative for apnea, cough and shortness of breath.    Cardiovascular: Negative for chest pain, palpitations and leg swelling.   Gastrointestinal: Positive for heartburn. Negative for abdominal distention, abdominal pain, anal bleeding, blood in stool, constipation, diarrhea, nausea and vomiting.   Endocrine: Negative for cold intolerance, heat intolerance and polydipsia.   Genitourinary: Negative for dysuria, hematuria and urgency.   Musculoskeletal: Positive for arthralgias and back pain. Negative for joint swelling and myalgias.   Skin: Negative for rash.   Allergic/Immunologic: Positive for food allergies. Negative for immunocompromised state.        Coconut   Neurological: Negative for dizziness, seizures, syncope and headaches.   Hematological: Negative for adenopathy. Does not bruise/bleed easily.   Psychiatric/Behavioral: Negative for dysphoric mood. The patient is nervous/anxious. The patient is not hyperactive.      Patient Active Problem List   Diagnosis   • Precordial pain   • Angina, class III (CMS/[facility])   • Tobacco abuse   • Family history of premature coronary artery disease   • Heartburn   • Acute pancreatitis without infection or necrosis   • Abnormal CT of the abdomen   • Chest pain     Past Medical History:   Diagnosis Date   • Back pain    • COPD (chronic obstructive pulmonary disease) (CMS/HCC)    • MI (myocardial infarction) (CMS/[facility]) 2019    \"Being worked up for this\"     Past Surgical History:   Procedure Laterality Date   • APPENDECTOMY     • BACK SURGERY      Removed bone spurs/cleaned up the area   • CARDIAC CATHETERIZATION N/A 2019    Procedure: Left Heart Cath;  Surgeon: Dre Izquierdo MD;  Location: [facility] CATH INVASIVE LOCATION;  Service: Cardiology   •  SECTION  ,    • CHOLECYSTECTOMY      + " "stones   • HYSTERECTOMY     • SHOULDER ACROMIOPLASTY  1998     Family History   Problem Relation Age of Onset   • Heart disease Mother    • Heart disease Father    • Heart disease Brother    • Cirrhosis Neg Hx    • Liver cancer Neg Hx    • Liver disease Neg Hx    • Colon cancer Neg Hx    • Crohn's disease Neg Hx    • Ulcerative colitis Neg Hx    • Esophageal cancer Neg Hx    • Stomach cancer Neg Hx      Social History     Tobacco Use   • Smoking status: Current Every Day Smoker     Packs/day: 1.50     Types: Cigarettes     Start date: 1971   • Smokeless tobacco: Never Used   Substance Use Topics   • Alcohol use: No     Frequency: Never     Comment: \"not in 30 years\"       Current Outpatient Medications:   •  albuterol sulfate  (90 Base) MCG/ACT inhaler, Inhale 2 puffs Every 4 (Four) Hours As Needed for Wheezing., Disp: , Rfl:   •  aspirin 81 MG EC tablet, Take 81 mg by mouth Daily., Disp: , Rfl:   •  buPROPion SR (WELLBUTRIN SR) 150 MG 12 hr tablet, Take 1 tablet by mouth 2 (Two) Times a Day. Take 1 tablet every morning x 3 days, then increase to 1 tablet twice a day, Disp: 60 tablet, Rfl: 0  •  isosorbide mononitrate (IMDUR) 60 MG 24 hr tablet, Take 1 tablet by mouth Every Morning., Disp: 30 tablet, Rfl: 11  •  Multiple Vitamins-Minerals (ALIVE WOMENS ENERGY PO), Take 1 tablet by mouth Daily., Disp: , Rfl:   •  Naproxen Sodium (ALEVE PO), Take  by mouth Daily., Disp: , Rfl:   •  raNITIdine (ZANTAC) 150 MG tablet, Take 150 mg by mouth As Needed for Heartburn., Disp: , Rfl:   •  simvastatin (ZOCOR) 20 MG tablet, Take 20 mg by mouth Every Night., Disp: , Rfl:   •  tiotropium bromide-olodaterol (STIOLTO RESPIMAT) 2.5-2.5 MCG/ACT aerosol solution inhaler, Inhale 1 puff Daily., Disp: 4 g, Rfl: 6  •  Vitamins C E (VITAMIN C & E COMBINATION PO), Take  by mouth Daily., Disp: , Rfl:   •  nitroglycerin (NITROSTAT) 0.4 MG SL tablet, Place 0.4 mg under the tongue Every 5 (Five) Minutes As Needed for Chest Pain. Take no " "more than 3 doses in 15 minutes., Disp: , Rfl:     Allergies   Allergen Reactions   • Penicillins Anaphylaxis   • Coconut Swelling and Rash     Tongue swells   • Morphine Swelling     /80   Pulse 65   Temp 98.5 °F (36.9 °C)   Resp 16   Ht 165.1 cm (65\")   Wt 80.7 kg (178 lb)   LMP  (LMP Unknown)   BMI 29.62 kg/m²     Physical Exam   Constitutional: She is oriented to person, place, and time. She appears well-developed and well-nourished. No distress.   HENT:   Head: Normocephalic and atraumatic.   Right Ear: Hearing and external ear normal.   Left Ear: Hearing and external ear normal.   Nose: Nose normal.   Mouth/Throat: Oropharynx is clear and moist and mucous membranes are normal. Mucous membranes are not pale, not dry and not cyanotic. No oral lesions. No oropharyngeal exudate.   Eyes: Conjunctivae and EOM are normal. Right eye exhibits no discharge. Left eye exhibits no discharge.   Neck: Trachea normal. Neck supple. No JVD present. No edema present. No thyroid mass and no thyromegaly present.   Cardiovascular: Normal rate, regular rhythm, S2 normal and normal heart sounds. Exam reveals no gallop, no S3 and no friction rub.   No murmur heard.  Pulmonary/Chest: Effort normal and breath sounds normal. No respiratory distress. She exhibits no tenderness.   Abdominal: Normal appearance and bowel sounds are normal. She exhibits no distension, no ascites and no mass. There is no splenomegaly or hepatomegaly. There is no tenderness. There is no rigidity, no rebound and no guarding. No hernia.     Vascular Status -  Her right foot exhibits no edema. Her left foot exhibits no edema.  Lymphadenopathy:     She has no cervical adenopathy.        Left: No supraclavicular adenopathy present.   Neurological: She is alert and oriented to person, place, and time. She has normal strength. No cranial nerve deficit or sensory deficit.   Skin: No rash noted. She is not diaphoretic. No cyanosis. No pallor. Nails show no " clubbing.   Psychiatric: She has a normal mood and affect.   Nursing note and vitals reviewed.  Stigmata of chronic liver disease:  None.  Asterixis:  None.    Laboratory Tests:   Upon review of records:     Dated 7/14/2019 total cholesterol 240, , , triglycerides 136, vitamin B1 45, vitamin B12 1034, vitamin D 49.5     Dated 7/29/2019 glucose 107 BUN 9 creatinine 0.84 sodium 139 potassium 4.0 chloride 99 CO2 25.7 calcium 9.6 albumin 3.84 ALT 15 AST 23 alkaline phosphatase 92 total bilirubin 0.6 WBC 11.03 hemoglobin 15.3 hematocrit 45.0 platelet count 214 MCV 94.1 lipase 12 d-dimer 0.59 BNP 72.1 troponin <0.010     Dated 8/5/2019 glucose 100 potassium 5.3 sodium 143 albumin 4.5 alkaline phosphatase 96 ALT 13 AST 19 total bilirubin 0.6 BUN 9 creatinine 0.62 WBC 11.3 hemoglobin 15.7 hematocrit 46.7 platelet count 224 MCV 84.2 BNP 92, EBV antibody IgM: Negative, hepatitis A IgM: Nonreactive, hepatitis B core IgM: Nonreactive, hepatitis B surface antigen: Nonreactive, hepatitis C antibody: Nonreactive, H. pylori breath test: Negative, TSH 2.19    Dated 9/6/2019 glucose 88 BUN 11 creatinine 0.84 sodium 144 potassium 4.1 chloride 106 CO2 29.3 calcium 9.3 albumin 3.98 ALT 9 AST 13 alkaline phosphatase 92 total bilirubin 0.5 WBC 9.77 hemoglobin 14.0 hematocrit 42.2 platelet count 194 MCV 95.0     Abdominal Imaging:  Upon review of records:     CT of chest pulmonary embolism with contrast dated 7/29/2019 reveals no evidence of pulmonary embolism.  Granulomatous changes with left side calcified mediastinal and hilar lymph nodes.  Severe centrilobular emphysema.  Multiple scattered calcified granulomas both lungs.  Mild cardiomegaly with mild coronary artery calcifications.  No effusion.  Mediastinum with no masses.  No enlarged lymph nodes.  No fluid collections.  No pleural effusion.  No pleural mass or abnormal calcification.  No pneumothorax.  Major airways clear.  No intrinsic mass.  No evidence of aneurysm.   Stranding and inflammation surrounding the tail of the pancreas compatible with acute pancreatitis.  No acute bony abnormality.    Abdominal ultrasound dated 9/12/2019 reveals visualized pancreas is unremarkable.  The imaged portion of the abdominal aorta is nondilated.  The liver is homogenous.  There is no intrahepatic ductal dilatation or focal hepatic mass.  Hepatic flow is hepatopetal.  The imaged portion of the hepatic vessels and inferior vena cava are patent.  The common bile duct is normal, measuring 3.96 mm.  The kidneys demonstrate no evidence of hydronephrosis or solid renal mass.  The spleen is homogenous and measures 7.77 cm.    Assessment:      ICD-10-CM ICD-9-CM   1. Heartburn R12 787.1   2. Chest pain, unspecified type R07.9 786.50   3. Colon cancer screening Z12.11 V76.51     Plan/  Patient Instructions   1. Antireflux measures: Avoid fried, fatty foods, alcohol, chocolate, coffee, tea,  soft drinks, peppermint and spearmint, spicy foods, tomatoes and tomato based foods, onion based foods, and smoking. Other antireflux measures include weight reduction if overweight, avoiding tight clothing around the abdomen, elevating the head of the bed 6 inches with blocks under the head board, and don't drink or eat before going to bed and avoid lying down immediately after meals.  2. Ranitidine 150 mg 1 po twice daily.  3. High fiber, low fat diet with liberal water intake.   4. The patient declines colonoscopy at this time as she will be going back to work soon as a  and will not be in town.  5. Upper endoscopy-EGD: Description of the procedure, risks, benefits, alternatives and options, including nonoperative options, were discussed with the patient in detail. The patient understands and wishes to proceed.     DEWAYNE Kim

## 2019-10-14 ENCOUNTER — DISEASE STATE MANAGEMENT VISIT (OUTPATIENT)
Dept: PHARMACY | Facility: HOSPITAL | Age: 64
End: 2019-10-14

## 2019-10-14 NOTE — PROGRESS NOTES
Patient called back today and stated that her safety office will allow the patient to be on Wellbutrin as long as she is not having any dizziness or drowsiness.     Milton Wilder RPH  10/14/2019

## 2019-10-14 NOTE — PROGRESS NOTES
Tobacco Cessation Pharmacy Note    HPI:     Years of tobacco use: 40+  Average number of cigarettes or other tobacco/nicotine products per day:1.5 ppd  Number of Previous Quit Attempts:    Previous tobacco cessation medication attempts, failures, intolerances: States success with bupropion in the past.   Other recreational substance use: none  1st cigarette of the day smoked: first thing in the morning      Past Medical History:   Diagnosis Date   • Acute pancreatitis    • Angina, class III (CMS/HCC)    • Back pain    • Bilateral cataracts    • Chest pain    • Chronic bronchitis (CMS/HCC)    • Cigarette nicotine dependence    • COPD (chronic obstructive pulmonary disease) (CMS/HCC)    • Current smoker    • Full dentures    • GERD (gastroesophageal reflux disease)    • H/O cardiac catheterization 09/06/2019    Dr. Izquierdo, Conclusion: 1.Overall mild coronary artery disease with right dominant system.   • Heart palpitations    • History of nuclear stress test 08/28/2019    ·Left ventricular ejection fraction is hyperdynamic (Calculated EF > 70%). Impressions are consistent with a low risk study.   • Hyperlipidemia    • PONV (postoperative nausea and vomiting)    • Precordial chest pain    • SOB (shortness of breath)      Allergies   Allergen Reactions   • Penicillins Anaphylaxis   • Coconut Swelling and Rash     Tongue swells   • Morphine Swelling     Current Outpatient Medications   Medication Sig Dispense Refill   • albuterol sulfate  (90 Base) MCG/ACT inhaler Inhale 2 puffs Every 4 (Four) Hours As Needed for Wheezing.     • aspirin 81 MG EC tablet Take 81 mg by mouth Daily.     • buPROPion SR (WELLBUTRIN SR) 150 MG 12 hr tablet Take 1 tablet by mouth 2 (Two) Times a Day. Take 1 tablet every morning x 3 days, then increase to 1 tablet twice a day 60 tablet 0   • isosorbide mononitrate (IMDUR) 60 MG 24 hr tablet Take 1 tablet by mouth Every Morning. 30 tablet 11   • Multiple Vitamins-Minerals (ALIVE WOMENS  ENERGY PO) Take 1 tablet by mouth Daily.     • Naproxen Sodium (ALEVE PO) Take  by mouth Daily.     • nitroglycerin (NITROSTAT) 0.4 MG SL tablet Place 0.4 mg under the tongue Every 5 (Five) Minutes As Needed for Chest Pain. Take no more than 3 doses in 15 minutes.     • raNITIdine (ZANTAC) 150 MG tablet Take 150 mg by mouth As Needed for Heartburn.     • simvastatin (ZOCOR) 20 MG tablet Take 20 mg by mouth Every Night.     • tiotropium bromide-olodaterol (STIOLTO RESPIMAT) 2.5-2.5 MCG/ACT aerosol solution inhaler Inhale 1 puff Daily. 4 g 6   • Vitamins C E (VITAMIN C & E COMBINATION PO) Take  by mouth Daily.       No current facility-administered medications for this visit.        Assessment:     Readiness to quit:     Stage 2: Ready to quit in the next month    Plan:     1. Quit Date Planned: 1-2 weeks  2. Behavioral options for quitting reviewed  3. Provided additional resources to help with tobacco cessation. Provided patient with benefits of quitting handout.    4. Medication options reviewed.  Risks, benefits, and side effects discussed and questions answered.   5. Education was provided regarding: motivation to cease tobacco use, drug information on the medication dispensed (including directions for use and adverse effects), nicotine withdrawal symptoms, lifestyle modifications and techniques to prevent relapse.   6. Pursuant to the [place] Board of Pharmacy Approved Protocol,  will order: bupropion  mg BID x 4 weeks. Will need to call Sharon Rhodes to make sure she is okay with patient continuing medication.   7. Will notify the patient's PCP, Maude Snow  8. Will follow up with patient in 4 weeks  9. 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 Patient was instructed to make sure that she was able to start on the medication and be a . Patient stated that she will contact the safety office and find out if that medication will be fine.       Milton Wilder RPH  10/14/19  1:20  PM

## 2019-10-15 ENCOUNTER — HOSPITAL ENCOUNTER (OUTPATIENT)
Facility: HOSPITAL | Age: 64
Setting detail: HOSPITAL OUTPATIENT SURGERY
Discharge: HOME | End: 2019-10-15
Attending: INTERNAL MEDICINE | Admitting: INTERNAL MEDICINE

## 2019-10-15 ENCOUNTER — ANESTHESIA EVENT (OUTPATIENT)
Dept: GASTROENTEROLOGY | Facility: HOSPITAL | Age: 64
End: 2019-10-15

## 2019-10-15 ENCOUNTER — ANESTHESIA (OUTPATIENT)
Dept: GASTROENTEROLOGY | Facility: HOSPITAL | Age: 64
End: 2019-10-15

## 2019-10-15 VITALS
SYSTOLIC BLOOD PRESSURE: 116 MMHG | HEIGHT: 65 IN | OXYGEN SATURATION: 98 % | HEART RATE: 78 BPM | BODY MASS INDEX: 28.32 KG/M2 | RESPIRATION RATE: 16 BRPM | TEMPERATURE: 98 F | DIASTOLIC BLOOD PRESSURE: 65 MMHG | WEIGHT: 170 LBS

## 2019-10-15 DIAGNOSIS — R12 HEARTBURN: ICD-10-CM

## 2019-10-15 DIAGNOSIS — R07.9 CHEST PAIN, UNSPECIFIED TYPE: ICD-10-CM

## 2019-10-15 PROCEDURE — C1726 CATH, BAL DIL, NON-VASCULAR: HCPCS | Performed by: INTERNAL MEDICINE

## 2019-10-15 PROCEDURE — 25010000002 PROPOFOL 200 MG/20ML EMULSION: Performed by: NURSE ANESTHETIST, CERTIFIED REGISTERED

## 2019-10-15 PROCEDURE — 43249 ESOPH EGD DILATION <30 MM: CPT | Performed by: INTERNAL MEDICINE

## 2019-10-15 PROCEDURE — 25010000002 ONDANSETRON PER 1 MG: Performed by: NURSE ANESTHETIST, CERTIFIED REGISTERED

## 2019-10-15 PROCEDURE — 43239 EGD BIOPSY SINGLE/MULTIPLE: CPT | Performed by: INTERNAL MEDICINE

## 2019-10-15 RX ORDER — BUPROPION HYDROCHLORIDE 150 MG/1
150 TABLET, EXTENDED RELEASE ORAL 2 TIMES DAILY
Qty: 60 TABLET | Refills: 0 | Status: SHIPPED | OUTPATIENT
Start: 2019-10-15 | End: 2020-06-01 | Stop reason: ALTCHOICE

## 2019-10-15 RX ORDER — ONDANSETRON 2 MG/ML
INJECTION INTRAMUSCULAR; INTRAVENOUS AS NEEDED
Status: DISCONTINUED | OUTPATIENT
Start: 2019-10-15 | End: 2019-10-15 | Stop reason: SURG

## 2019-10-15 RX ORDER — PROPOFOL 10 MG/ML
INJECTION, EMULSION INTRAVENOUS AS NEEDED
Status: DISCONTINUED | OUTPATIENT
Start: 2019-10-15 | End: 2019-10-15 | Stop reason: SURG

## 2019-10-15 RX ORDER — SODIUM CHLORIDE 9 MG/ML
70 INJECTION, SOLUTION INTRAVENOUS CONTINUOUS PRN
Status: DISCONTINUED | OUTPATIENT
Start: 2019-10-15 | End: 2019-10-15 | Stop reason: HOSPADM

## 2019-10-15 RX ORDER — PANTOPRAZOLE SODIUM 40 MG/1
TABLET, DELAYED RELEASE ORAL
Qty: 30 TABLET | Refills: 3 | Status: SHIPPED | OUTPATIENT
Start: 2019-10-15 | End: 2020-06-01 | Stop reason: SDUPTHER

## 2019-10-15 RX ORDER — PROPOFOL 10 MG/ML
INJECTION, EMULSION INTRAVENOUS AS NEEDED
Status: DISCONTINUED | OUTPATIENT
Start: 2019-10-15 | End: 2019-10-15

## 2019-10-15 RX ORDER — LIDOCAINE HYDROCHLORIDE 20 MG/ML
INJECTION, SOLUTION INTRAVENOUS AS NEEDED
Status: DISCONTINUED | OUTPATIENT
Start: 2019-10-15 | End: 2019-10-15 | Stop reason: SURG

## 2019-10-15 RX ORDER — SODIUM CHLORIDE 0.9 % (FLUSH) 0.9 %
10 SYRINGE (ML) INJECTION AS NEEDED
Status: DISCONTINUED | OUTPATIENT
Start: 2019-10-15 | End: 2019-10-15 | Stop reason: HOSPADM

## 2019-10-15 RX ADMIN — PROPOFOL 100 MG: 10 INJECTION, EMULSION INTRAVENOUS at 09:05

## 2019-10-15 RX ADMIN — LIDOCAINE HYDROCHLORIDE 60 MG: 20 INJECTION, SOLUTION INTRAVENOUS at 08:54

## 2019-10-15 RX ADMIN — SODIUM CHLORIDE 70 ML/HR: 9 INJECTION, SOLUTION INTRAVENOUS at 07:26

## 2019-10-15 RX ADMIN — PROPOFOL 100 MG: 10 INJECTION, EMULSION INTRAVENOUS at 09:15

## 2019-10-15 RX ADMIN — PROPOFOL 100 MG: 10 INJECTION, EMULSION INTRAVENOUS at 09:10

## 2019-10-15 RX ADMIN — PROPOFOL 100 MG: 10 INJECTION, EMULSION INTRAVENOUS at 09:02

## 2019-10-15 RX ADMIN — ONDANSETRON 4 MG: 2 INJECTION INTRAMUSCULAR; INTRAVENOUS at 08:54

## 2019-10-15 NOTE — DISCHARGE INSTRUCTIONS
No pushing, pulling, tugging,  heavy lifting, or strenuous activity.  No major decision making, driving, or drinking alcoholic beverages for 24 hours. ( due to the medications you have  received)  Always use good hand hygiene/washing techniques.  NO driving while taking pain medications.    * if you have an incision:  Check your incision area every day for signs of infection.   Check for:  * more redness, swelling, or pain  *more fluid or blood  *warmth  *pus or bad smell    To assist you in voiding:  Drink plenty of fluids  Listen to running water while attempting to void.    If you are unable to urinate and you have an uncomfortable urge to void or it has been   6 hours since you were discharged, return to the Emergency Room        NK F EGD DIL*********************************************************************    Postprocedure instructions:  1. Nothing by mouth until fully alert and as specified below .  2. Bedrest until fully alert.  3. Vital signs as routine.    Diet:   1. Nothing by mouth for 60 minutes, then if no chest pains the patient may have Clear liquids diet (No Sodas) for 4 hours.  2. May advance to soft diet in 4 hours if no chest pains, Fever or chills, nausea vomiting or bleeding.    Other instructions:  1. The patient may eat in upright position, chew well, take small bites and take medications in upright position.   2. The patient should drink water after 3-4 bites, and liberally with medications.    3. The patient should remain upright for about 10 minutes after eating and taking oral medications.    Blood Thinner and other medications Directions:  Avoid Aspirin & other NSAIDS for 7 days.  Tylenol is okay.    Other instructions:  The patient should avoid medications that have a potential to cause pill esophagitis including potassium pills, tetracycline capsules, iron pills, NSAIDs and bisphosphonates.    Follow-up:    DR. SONALI LUNA in 4 weeks.Office phone #  [phone].  ****************************************************************************************************    Notes to the patient and the family from Dr. Solorzano.     Dear patient/family member,    Findings on today's procedure are as follows:    1. Esophagitis. Inflammation of the esophagus.  2. Esophageal rings. Schatzki's ring. These areas were stretched.   3. Inflammation of the stomach. Gastritis.  4. Small sliding hiatal hernia.  5. No cancer. No active ulcers.     Recommendations:    1. Protonix (Pantoprazole) tablet 40 mg tablet. Take 1 tablet orally in the morning half an hour before eating every day.  2. Other instructions as above.    Should you have more questions please do not hesitate to talk to the nurse who can call me and let me talk to you.     I hope you feel better.    Ambrocio Solorzano M.D., FACP, FACG.

## 2019-10-15 NOTE — INTERVAL H&P NOTE
"  H&P reviewed. The patient was examined and there are no changes to the H&P.       No recent shortness of breath or chest pains.      Blood pressure 126/66, pulse 66, temperature 97.3 °F (36.3 °C), temperature source Tympanic, resp. rate 16, height 165.1 cm (65\"), weight 77.1 kg (170 lb), SpO2 98 %, not currently breastfeeding.    Chest: clear to auscultation.  Cardiac exam: No S3 no murmurs.   Abdomen: soft bowel sounds present nondistended nontender.        "

## 2019-10-15 NOTE — ANESTHESIA POSTPROCEDURE EVALUATION
Patient: Itzel Hagen    Procedure Summary     Date:  10/15/19 Room / Location:  [facility] ENDOSCOPY 2 / [facility] ENDOSCOPY    Anesthesia Start:  0854 Anesthesia Stop:  0916    Procedure:  ESOPHAGOGASTRODUODENOSCOPY WITH BIOPSY AND ESOPHAGEAL DILATATION (N/A Esophagus) Diagnosis:       Heartburn      Chest pain, unspecified type      (Heartburn [R12])      (Chest pain, unspecified type [R07.9])    Surgeon:  Ambrocio Solorzano MD Provider:  Talat Oliva CRNA    Anesthesia Type:  Not recorded ASA Status:  3          Anesthesia Type: MAC  Last vitals  BP   116/65 (10/15/19 1020)   Temp   98 °F (36.7 °C) (10/15/19 0920)   Pulse   78 (10/15/19 1020)   Resp   16 (10/15/19 1020)     SpO2   98 % (10/15/19 1020)     Post Anesthesia Care and Evaluation    Patient location during evaluation: bedside  Patient participation: complete - patient participated  Level of consciousness: awake  Pain score: 0  Pain management: adequate  Airway patency: patent  Anesthetic complications: No anesthetic complications  PONV Status: controlled  Cardiovascular status: acceptable and stable  Respiratory status: acceptable and room air  Hydration status: acceptable       augmentin rash/done

## 2019-10-15 NOTE — OP NOTE
PROCEDURE:  Upper Endoscopy with serial dilation using 15-16.5-18 mm CRE balloon to 18 mm biopsies.    DATE OF PROCEDURE: October 15, 2019.    REFERRING PROVIDER:  Maude Snow APRN.     INSTRUMENT:  Olympus GIF H 190 video endoscope     INDICATIONS OF THE PROCEDURE: This is a 63-year-old white female with history of recurrent reflux and atypical chest pains.     BIOPSIES: Second portion of duodenum.  Gastric antrum, body and fundus.       MEDICATIONS:  MAC.     PHOTOGRAPHS:  Photographs were included in the medical records.     CONSENT/PREPROCEDURE EVALUATION:  Risks, benefits, alternatives and options of the procedure including risks of anesthesia/sedation were discussed and informed consent was obtained prior to the procedure. History and physical examination were performed and nothing precluded the test.     REPORT:  The patient was placed in left lateral decubitus position. Once under the influence of IV sedation, the instrument was inserted into the mouth and esophagus was intubated under direct vision without difficulty.     Esophagus:  Z line was noted to be around 35 cm.  A small sliding hiatal hernia less than 3 cm was noted.  No Castillo's esophagus was seen.  Erosive distal esophagitis.  LA class C.  Distal esophageal stricture was noted.  Subtle concentric rings were noted within the mid and distal esophagus.  Biopsies were obtained in these areas.     Stomach:  Antrum:  Erythematous gastritis.  Angulus, lesser and greater curves: Normal.  Retroflex examination: Sliding hiatal hernia.  Cardia and fundus:  Normal.     Body of the stomach: Erythematous gastritis.  Good distensibility of the stomach was achieved no giant folds were noted.   Biopsies were obtained from the gastric antrum,  body and fundus.    Pylorus and pyloric channel:  normal.     Duodenum:  Bulb: Normal.  Second portion: normal.  No scalloping was seen in the second portion of duodenum.  Biopsies were obtained from the second  portion of duodenum.    Intervention: Distal and mid esophagus were dilated using 15-16.5-18 mm CRE balloon to 18 mm under vision without difficulty.  No active bleeding was noted.  .       The upper GI tract was decompressed and the scope was pulled out of the patient. The patient tolerated the procedure well.     DIAGNOSES:     1. Erosive distal esophagitis.  LA class C.  2. Small sliding hiatal hernia less than 3 cm.  3. Schatzki's ring.  Subtle concentric rings involving the mid and distal esophagus.  Status post serial dilation to 18 mm.  4. Erythematous gastritis.    RECOMMENDATIONS:  1.  Dietary instructions.  2.  Pantoprazole 40 mg 1 p.o. q.a.m. 1/2 hour before breakfast.  3.  Follow biopsies.  4.  Follow up in office.     Thank you very much for letting me participate in the care of this patient. Please do not hesitate to call me if you have any questions.

## 2019-10-15 NOTE — ANESTHESIA PREPROCEDURE EVALUATION
Anesthesia Evaluation     Patient summary reviewed and Nursing notes reviewed   history of anesthetic complications: PONV  NPO Solid Status: > 8 hours  NPO Liquid Status: > 8 hours           Airway   Mallampati: I  TM distance: >3 FB  Neck ROM: full  no difficulty expected  Dental - normal exam     Pulmonary - normal exam   (+) COPD,   Cardiovascular - normal exam    (+) angina, hyperlipidemia,       Neuro/Psych- negative ROS  GI/Hepatic/Renal/Endo    (+)  GERD,      Musculoskeletal (-) negative ROS    Abdominal    Substance History - negative use     OB/GYN negative ob/gyn ROS         Other - negative ROS                       Anesthesia Plan    ASA 3     MAC     intravenous induction   Anesthetic plan, all risks, benefits, and alternatives have been provided, discussed and informed consent has been obtained with: patient.

## 2019-10-18 LAB
LAB AP CASE REPORT: NORMAL
PATH REPORT.FINAL DX SPEC: NORMAL

## 2019-12-20 ENCOUNTER — OFFICE VISIT (OUTPATIENT)
Dept: CARDIOLOGY | Facility: CLINIC | Age: 64
End: 2019-12-20

## 2019-12-20 VITALS
HEART RATE: 68 BPM | DIASTOLIC BLOOD PRESSURE: 76 MMHG | HEIGHT: 65 IN | SYSTOLIC BLOOD PRESSURE: 126 MMHG | BODY MASS INDEX: 30.09 KG/M2 | WEIGHT: 180.6 LBS | OXYGEN SATURATION: 98 %

## 2019-12-20 DIAGNOSIS — Z82.49 FAMILY HISTORY OF PREMATURE CORONARY ARTERY DISEASE: ICD-10-CM

## 2019-12-20 DIAGNOSIS — R07.2 PRECORDIAL PAIN: Primary | ICD-10-CM

## 2019-12-20 DIAGNOSIS — Z72.0 TOBACCO ABUSE: ICD-10-CM

## 2019-12-20 PROCEDURE — 99213 OFFICE O/P EST LOW 20 MIN: CPT | Performed by: PHYSICIAN ASSISTANT

## 2019-12-20 RX ORDER — ISOSORBIDE MONONITRATE 120 MG/1
120 TABLET, EXTENDED RELEASE ORAL EVERY MORNING
Qty: 90 TABLET | Refills: 2 | Status: SHIPPED | OUTPATIENT
Start: 2019-12-20 | End: 2020-05-28 | Stop reason: SDUPTHER

## 2019-12-20 RX ORDER — ISOSORBIDE MONONITRATE 60 MG/1
60 TABLET, EXTENDED RELEASE ORAL EVERY MORNING
Qty: 90 TABLET | Refills: 3 | Status: SHIPPED | OUTPATIENT
Start: 2019-12-20 | End: 2019-12-20 | Stop reason: SDUPTHER

## 2019-12-20 NOTE — PROGRESS NOTES
Maude Snow APRN  Itzel Hagen  1955 12/20/2019    Patient Active Problem List   Diagnosis   • Precordial pain   • Angina, class III (CMS/HCC)   • Tobacco abuse   • Family history of premature coronary artery disease   • Heartburn   • Acute pancreatitis without infection or necrosis   • Abnormal CT of the abdomen   • Chest pain       Dear Maude Snow APRN:    Subjective     History of Present Illness:    Chief Complaint   Patient presents with   • Chest Pain       Itzel Hagen is a pleasant 64 y.o. female with a past medical history significant for nonobstructive CAD with 20% stenosis in the RCA. She does smoke tobacco, and does have premature family history with her mother having a CABG but does have a brother who has received stents and is 40 years of age. She does not have diabetes. She comes in for routine cardiology follow up.     Patient reports she has still been having some chest pains at around her xiphoid area that she describes as a pressure but at times can also be felt as a sharp pain.  She reports this pain comes on 2-3 times weekly and will last 2 to 3 hours in duration.  She reports it is also started radiating up into the left side of her neck.  She does admit associated symptoms of diaphoresis but denies worsening shortness of breath over baseline.       Allergies   Allergen Reactions   • Penicillins Anaphylaxis   • Coconut Swelling and Rash     Tongue swells   • Morphine Swelling   :      Current Outpatient Medications:   •  buPROPion SR (WELLBUTRIN SR) 150 MG 12 hr tablet, Take 1 tablet by mouth 2 (Two) Times a Day., Disp: 60 tablet, Rfl: 0  •  Multiple Vitamins-Minerals (ALIVE WOMENS ENERGY PO), Take 1 tablet by mouth Daily., Disp: , Rfl:   •  Naproxen Sodium (ALEVE PO), Take  by mouth Daily., Disp: , Rfl:   •  nitroglycerin (NITROSTAT) 0.4 MG SL tablet, Place 0.4 mg under the tongue Every 5 (Five) Minutes As Needed for Chest Pain. Take no more than 3 doses  "in 15 minutes., Disp: , Rfl:   •  pantoprazole (PROTONIX) 40 MG EC tablet, Take 1 tablet by mouth 30 minutes before breakfast daily., Disp: 30 tablet, Rfl: 3  •  simvastatin (ZOCOR) 20 MG tablet, Take 20 mg by mouth Every Night., Disp: , Rfl:   •  umeclidinium-vilanterol (ANORO ELLIPTA) 62.5-25 MCG/INH aerosol powder  inhaler, Inhale 1 puff Daily., Disp: 60 each, Rfl: 6  •  Vitamins C E (VITAMIN C & E COMBINATION PO), Take  by mouth Daily., Disp: , Rfl:   •  isosorbide mononitrate (IMDUR) 120 MG 24 hr tablet, Take 1 tablet by mouth Every Morning., Disp: 90 tablet, Rfl: 2    The following portions of the patient's history were reviewed and updated as appropriate: allergies, current medications, past family history, past medical history, past social history, past surgical history and problem list.    Social History     Tobacco Use   • Smoking status: Current Every Day Smoker     Packs/day: 1.50     Types: Cigarettes     Start date: 1971   • Smokeless tobacco: Never Used   Substance Use Topics   • Alcohol use: No     Frequency: Never     Comment: \"not in 30 years\"   • Drug use: No       Review of Systems   Constitution: Negative for malaise/fatigue.   Cardiovascular: Positive for chest pain. Negative for dyspnea on exertion and irregular heartbeat.   Respiratory: Negative for cough and shortness of breath.    Hematologic/Lymphatic: Negative for bleeding problem. Does not bruise/bleed easily.   Gastrointestinal: Negative for nausea and vomiting.   Neurological: Negative for weakness.       Objective   Vitals:    12/20/19 1344   BP: 126/76   Pulse: 68   SpO2: 98%   Weight: 81.9 kg (180 lb 9.6 oz)   Height: 165.1 cm (65\")     Body mass index is 30.05 kg/m².    Physical Exam   Constitutional: She is oriented to person, place, and time. She appears well-developed and well-nourished. No distress.   HENT:   Head: Normocephalic and atraumatic.   Cardiovascular: Normal rate, regular rhythm and normal heart sounds. "   Pulmonary/Chest: Effort normal and breath sounds normal. No respiratory distress.   Musculoskeletal: She exhibits no edema.   Neurological: She is alert and oriented to person, place, and time.   Skin: She is not diaphoretic.     Lab Results   Component Value Date     09/06/2019    K 4.1 09/06/2019     09/06/2019    CO2 29.3 (H) 09/06/2019    BUN 11 09/06/2019    CREATININE 0.84 09/06/2019    GLUCOSE 88 09/06/2019    CALCIUM 9.3 09/06/2019    AST 13 09/06/2019    ALT 9 09/06/2019    ALKPHOS 92 09/06/2019     No results found for: CKTOTAL  Lab Results   Component Value Date    WBC 9.77 09/06/2019    HGB 14.0 09/06/2019    HCT 42.2 09/06/2019     09/06/2019     No results found for: INR  No results found for: MG  No results found for: TSH, PSA, CHLPL, TRIG, HDL, LDL   No results found for: BNP    During this visit the following were done:  Labs Reviewed [x]    Labs Ordered []    Radiology Reports Reviewed [x]    Radiology Ordered []    PCP Records Reviewed []    Referring Provider Records Reviewed []    ER Records Reviewed []    Hospital Records Reviewed []    History Obtained From Family []    Radiology Images Reviewed []    Other Reviewed []    Records Requested []       Procedures    Assessment/Plan    Diagnosis Plan   1. Precordial pain     2. Tobacco abuse     3. Family history of premature coronary artery disease              Recommendations:  1. Since patient is having continued chest pains I will increase her Imdur to 120 mg p.o. daily since it is possible she could be having some microvascular dysfunction causing her pains.  However, I did still encourage her to pursue noncardiac work-up for these chest pains.    Return in about 3 months (around 3/20/2020).    As always, I appreciate very much the opportunity to participate in the cardiovascular care of your patients.      With Best Regards,    Sebastian Fuchs PA-C

## 2020-05-28 ENCOUNTER — OFFICE VISIT (OUTPATIENT)
Dept: CARDIOLOGY | Facility: CLINIC | Age: 65
End: 2020-05-28

## 2020-05-28 VITALS
RESPIRATION RATE: 16 BRPM | HEIGHT: 65 IN | SYSTOLIC BLOOD PRESSURE: 156 MMHG | BODY MASS INDEX: 30.99 KG/M2 | TEMPERATURE: 98.6 F | WEIGHT: 186 LBS | HEART RATE: 70 BPM | DIASTOLIC BLOOD PRESSURE: 88 MMHG

## 2020-05-28 DIAGNOSIS — R07.2 PRECORDIAL PAIN: Primary | ICD-10-CM

## 2020-05-28 DIAGNOSIS — Z72.0 TOBACCO ABUSE: ICD-10-CM

## 2020-05-28 PROCEDURE — 93000 ELECTROCARDIOGRAM COMPLETE: CPT | Performed by: PHYSICIAN ASSISTANT

## 2020-05-28 PROCEDURE — 99213 OFFICE O/P EST LOW 20 MIN: CPT | Performed by: PHYSICIAN ASSISTANT

## 2020-05-28 RX ORDER — SIMVASTATIN 20 MG
20 TABLET ORAL NIGHTLY
Qty: 90 TABLET | Refills: 2 | Status: SHIPPED | OUTPATIENT
Start: 2020-05-28 | End: 2020-06-15 | Stop reason: SDUPTHER

## 2020-05-28 RX ORDER — ISOSORBIDE MONONITRATE 120 MG/1
120 TABLET, EXTENDED RELEASE ORAL EVERY MORNING
Qty: 90 TABLET | Refills: 2 | Status: SHIPPED | OUTPATIENT
Start: 2020-05-28 | End: 2020-06-15 | Stop reason: SDUPTHER

## 2020-05-28 RX ORDER — ASPIRIN 81 MG/1
81 TABLET ORAL DAILY
Qty: 90 TABLET | Refills: 2 | Status: SHIPPED | OUTPATIENT
Start: 2020-05-28

## 2020-05-28 RX ORDER — ASPIRIN 81 MG/1
81 TABLET ORAL DAILY
COMMUNITY
End: 2020-05-28 | Stop reason: SDUPTHER

## 2020-05-28 NOTE — PROGRESS NOTES
Maude Snow APRN  Itzel Hagen  1955 05/28/2020    Patient Active Problem List   Diagnosis   • Precordial pain   • Angina, class III (CMS/HCC)   • Tobacco abuse   • Family history of premature coronary artery disease   • Heartburn   • Acute pancreatitis without infection or necrosis   • Abnormal CT of the abdomen   • Chest pain       Dear Maude Snow APRN:    Subjective     History of Present Illness:    Chief Complaint   Patient presents with   • Chest Pain       Itzel Hagen is a pleasant 64 y.o. female with a past medical history significant for nonobstructive CAD with 20% stenosis in the RCA. She does smoke tobacco, and does have premature family history with her mother having a CABG but does have a brother who has received stents and is 40 years of age. She does not have diabetes. She comes in for routine cardiology follow up.     Mrs. Hagen, reports that since taking the higher dose of isosorbide mononitrate her symptoms have improved markedly.  Her chest pains went from a daily occurrence to less than once weekly.  She does have chronic shortness of breath from COPD from long-term use of tobacco but denies any recent worsening of this.  She also denies any palpitations, dizziness, or syncope.    Allergies   Allergen Reactions   • Penicillins Anaphylaxis   • Coconut Swelling and Rash     Tongue swells   • Morphine Swelling   :      Current Outpatient Medications:   •  aspirin 81 MG EC tablet, Take 1 tablet by mouth Daily., Disp: 90 tablet, Rfl: 2  •  ipratropium-albuterol (COMBIVENT RESPIMAT)  MCG/ACT inhaler, Inhale 1 puff 4 (Four) Times a Day As Needed for Wheezing., Disp: , Rfl:   •  isosorbide mononitrate (IMDUR) 120 MG 24 hr tablet, Take 1 tablet by mouth Every Morning., Disp: 90 tablet, Rfl: 2  •  Multiple Vitamins-Minerals (ALIVE WOMENS ENERGY PO), Take 1 tablet by mouth Daily., Disp: , Rfl:   •  Naproxen Sodium (ALEVE PO), Take  by mouth Daily., Disp: ,  "Rfl:   •  nitroglycerin (NITROSTAT) 0.4 MG SL tablet, Place 0.4 mg under the tongue Every 5 (Five) Minutes As Needed for Chest Pain. Take no more than 3 doses in 15 minutes., Disp: , Rfl:   •  simvastatin (ZOCOR) 20 MG tablet, Take 1 tablet by mouth Every Night., Disp: 90 tablet, Rfl: 2  •  umeclidinium-vilanterol (ANORO ELLIPTA) 62.5-25 MCG/INH aerosol powder  inhaler, Inhale 1 puff Daily., Disp: 60 each, Rfl: 6  •  Vitamins C E (VITAMIN C & E COMBINATION PO), Take  by mouth Daily., Disp: , Rfl:   •  buPROPion SR (WELLBUTRIN SR) 150 MG 12 hr tablet, Take 1 tablet by mouth 2 (Two) Times a Day., Disp: 60 tablet, Rfl: 0  •  pantoprazole (PROTONIX) 40 MG EC tablet, Take 1 tablet by mouth 30 minutes before breakfast daily., Disp: 30 tablet, Rfl: 3    The following portions of the patient's history were reviewed and updated as appropriate: allergies, current medications, past family history, past medical history, past social history, past surgical history and problem list.    Social History     Tobacco Use   • Smoking status: Current Every Day Smoker     Packs/day: 1.50     Types: Cigarettes     Start date: 1971   • Smokeless tobacco: Never Used   Substance Use Topics   • Alcohol use: No     Frequency: Never     Comment: \"not in 30 years\"   • Drug use: No       Review of Systems   Constitution: Negative for malaise/fatigue.   Cardiovascular: Negative for chest pain, dyspnea on exertion and irregular heartbeat.   Respiratory: Negative for cough and shortness of breath.    Hematologic/Lymphatic: Negative for bleeding problem. Does not bruise/bleed easily.   Gastrointestinal: Negative for nausea and vomiting.   Neurological: Negative for weakness.       Objective   Vitals:    05/28/20 1355   BP: 156/88   BP Location: Right arm   Pulse: 70   Resp: 16   Temp: 98.6 °F (37 °C)   Weight: 84.4 kg (186 lb)   Height: 165.1 cm (65\")     Body mass index is 30.95 kg/m².    Physical Exam   Constitutional: She is oriented to person, " place, and time. She appears well-developed and well-nourished. No distress.   HENT:   Head: Normocephalic and atraumatic.   Cardiovascular: Normal rate, regular rhythm and normal heart sounds.   Pulmonary/Chest: Effort normal and breath sounds normal. No respiratory distress.   Musculoskeletal: She exhibits no edema.   Neurological: She is alert and oriented to person, place, and time.   Skin: She is not diaphoretic.        Lab Results   Component Value Date     09/06/2019    K 4.1 09/06/2019     09/06/2019    CO2 29.3 (H) 09/06/2019    BUN 11 09/06/2019    CREATININE 0.84 09/06/2019    GLUCOSE 88 09/06/2019    CALCIUM 9.3 09/06/2019    AST 13 09/06/2019    ALT 9 09/06/2019    ALKPHOS 92 09/06/2019     No results found for: CKTOTAL  Lab Results   Component Value Date    WBC 9.77 09/06/2019    HGB 14.0 09/06/2019    HCT 42.2 09/06/2019     09/06/2019     No results found for: INR  No results found for: MG  No results found for: TSH, PSA, CHLPL, TRIG, HDL, LDL   No results found for: BNP    During this visit the following were done:  Labs Reviewed [x]    Labs Ordered []    Radiology Reports Reviewed [x]    Radiology Ordered []    PCP Records Reviewed []    Referring Provider Records Reviewed []    ER Records Reviewed []    Hospital Records Reviewed []    History Obtained From Family []    Radiology Images Reviewed []    Other Reviewed []    Records Requested []         ECG 12 Lead  Date/Time: 5/28/2020 1:55 PM  Performed by: Sebastian Fuchs PA-C  Authorized by: Sebastian Fuchs PA-C   Comparison: compared with previous ECG   Similar to previous ECG  Rhythm: sinus rhythm  Conduction: conduction normal    Clinical impression: non-specific ECG            Assessment/Plan    Diagnosis Plan   1. Precordial pain     2. Tobacco abuse              Recommendations:  1. Overall patient does appear stable from cardiac standpoint at this time.  I will continue Imdur, aspirin, and simvastatin. If symptoms  worsen I told her to contact our office.     Return in about 3 months (around 8/28/2020).    As always, I appreciate very much the opportunity to participate in the cardiovascular care of your patients.      With Best Regards,    Sebastian Fuchs PA-C

## 2020-06-01 ENCOUNTER — OFFICE VISIT (OUTPATIENT)
Dept: GASTROENTEROLOGY | Facility: CLINIC | Age: 65
End: 2020-06-01

## 2020-06-01 VITALS
DIASTOLIC BLOOD PRESSURE: 66 MMHG | SYSTOLIC BLOOD PRESSURE: 156 MMHG | WEIGHT: 185 LBS | TEMPERATURE: 97.8 F | HEIGHT: 65 IN | RESPIRATION RATE: 20 BRPM | HEART RATE: 65 BPM | BODY MASS INDEX: 30.82 KG/M2

## 2020-06-01 DIAGNOSIS — Z12.11 COLON CANCER SCREENING: Primary | ICD-10-CM

## 2020-06-01 DIAGNOSIS — R12 HEARTBURN: Chronic | ICD-10-CM

## 2020-06-01 DIAGNOSIS — K29.50 CHRONIC GASTRITIS WITHOUT BLEEDING, UNSPECIFIED GASTRITIS TYPE: Chronic | ICD-10-CM

## 2020-06-01 DIAGNOSIS — K22.10 ESOPHAGITIS, EROSIVE: ICD-10-CM

## 2020-06-01 PROBLEM — K21.9 GASTRO-ESOPHAGEAL REFLUX DISEASE WITHOUT ESOPHAGITIS: Status: ACTIVE | Noted: 2020-06-01

## 2020-06-01 PROBLEM — K86.1 CHRONIC PANCREATITIS: Status: ACTIVE | Noted: 2020-06-01

## 2020-06-01 PROBLEM — J43.2 CENTRILOBULAR EMPHYSEMA: Status: ACTIVE | Noted: 2020-06-01

## 2020-06-01 PROBLEM — I25.110 UNSTABLE ANGINA PECTORIS DUE TO CORONARY ARTERIOSCLEROSIS: Status: ACTIVE | Noted: 2020-06-01

## 2020-06-01 PROBLEM — J44.9 CHRONIC OBSTRUCTIVE PULMONARY DISEASE: Status: ACTIVE | Noted: 2020-06-01

## 2020-06-01 PROBLEM — K29.60 GASTRITIS, EROSIVE: Chronic | Status: ACTIVE | Noted: 2020-06-01

## 2020-06-01 PROBLEM — G93.32 CHRONIC FATIGUE SYNDROME: Status: ACTIVE | Noted: 2020-06-01

## 2020-06-01 PROCEDURE — 99213 OFFICE O/P EST LOW 20 MIN: CPT | Performed by: NURSE PRACTITIONER

## 2020-06-01 RX ORDER — PANTOPRAZOLE SODIUM 40 MG/1
TABLET, DELAYED RELEASE ORAL
Qty: 90 TABLET | Refills: 1 | Status: SHIPPED | OUTPATIENT
Start: 2020-06-01 | End: 2020-06-15 | Stop reason: SDUPTHER

## 2020-06-01 RX ORDER — PANTOPRAZOLE SODIUM 20 MG/1
TABLET, DELAYED RELEASE ORAL
Qty: 90 TABLET | Refills: 1 | Status: SHIPPED | OUTPATIENT
Start: 2020-06-01 | End: 2020-06-15 | Stop reason: SDUPTHER

## 2020-06-01 NOTE — PROGRESS NOTES
Chief Complaint   Patient presents with   • Heartburn     There is a long standing history of heartburn. Heartburn is severe. Reflux is worse at night. The patient is taking Pantoprazole once a day and and Pepcid twice a day with moderate control of heartburn.     The patient denies recent change in bowel habits. There is no diarrhea or constipation. There is no history of abdominal pain. There is no history of overt GI bleed (hematemesis melena or hematochezia). The patient denies nausea or vomiting. The patient denies dysphagia or odynophagia. There is no history of recent significant weight loss. There is no history of liver disease in the past. There is no family history of colon cancer. The patient has not had a colonoscopy in the past.    Heartburn   She complains of coughing and heartburn. She reports no abdominal pain, no chest pain or no nausea. This is a chronic problem. Episode onset: over 5 years ago. The problem occurs occasionally. The problem has been gradually improving. The heartburn duration is an hour. The heartburn is located in the substernum. The heartburn is of severe intensity. The heartburn wakes her from sleep. Nothing aggravates the symptoms. Pertinent negatives include no fatigue. There are no known risk factors. She has tried a histamine-2 antagonist and a PPI for the symptoms. The treatment provided moderate relief. Past procedures include an abdominal ultrasound (2019) and an EGD (10/15/2019).     Review of Systems   Constitutional: Negative for appetite change, chills, fatigue, fever and unexpected weight change.   HENT: Negative for mouth sores, nosebleeds and trouble swallowing.    Eyes: Negative for discharge and redness.   Respiratory: Positive for cough and shortness of breath. Negative for apnea.    Cardiovascular: Negative for chest pain, palpitations and leg swelling.   Gastrointestinal: Positive for heartburn. Negative for abdominal distention, abdominal pain, anal bleeding,  blood in stool, constipation, diarrhea, nausea and vomiting.   Endocrine: Negative for cold intolerance, heat intolerance and polydipsia.   Genitourinary: Negative for dysuria, hematuria and urgency.   Musculoskeletal: Negative for arthralgias, joint swelling and myalgias.   Skin: Negative for rash.   Allergic/Immunologic: Positive for food allergies (coconut). Negative for immunocompromised state.   Neurological: Positive for dizziness. Negative for seizures, syncope and headaches.   Hematological: Negative for adenopathy. Does not bruise/bleed easily.   Psychiatric/Behavioral: Negative for dysphoric mood. The patient is not nervous/anxious and is not hyperactive.      Patient Active Problem List   Diagnosis   • Precordial pain   • Angina, class III (CMS/HCC)   • Tobacco abuse   • Family history of premature coronary artery disease   • Heartburn   • Acute pancreatitis without infection or necrosis   • Abnormal CT of the abdomen   • Chest pain   • Centrilobular emphysema (CMS/HCC)   • Chronic fatigue syndrome   • Chronic obstructive pulmonary disease (CMS/HCC)   • Gastro-esophageal reflux disease without esophagitis   • Unstable angina pectoris due to coronary arteriosclerosis (CMS/HCC)   • Chronic pancreatitis (CMS/HCC)   • Esophagitis, erosive   • Gastritis, erosive     Past Medical History:   Diagnosis Date   • Acute pancreatitis    • Angina, class III (CMS/HCC)    • Back pain    • Bilateral cataracts    • Chest pain    • Chronic bronchitis (CMS/HCC)    • Cigarette nicotine dependence    • COPD (chronic obstructive pulmonary disease) (CMS/HCC)    • Current smoker    • Full dentures    • GERD (gastroesophageal reflux disease)    • H/O cardiac catheterization 09/06/2019    Dr. Izquierdo, Conclusion: 1.Overall mild coronary artery disease with right dominant system.   • Heart palpitations    • History of nuclear stress test 08/28/2019    ·Left ventricular ejection fraction is hyperdynamic (Calculated EF > 70%).  "Impressions are consistent with a low risk study.   • Hyperlipidemia    • PONV (postoperative nausea and vomiting)    • Precordial chest pain    • SOB (shortness of breath)      Past Surgical History:   Procedure Laterality Date   • APPENDECTOMY     • BACK SURGERY      Removed bone spurs/cleaned up the area   • CARDIAC CATHETERIZATION N/A 2019    Procedure: Left Heart Cath;  Surgeon: Dre Izquierdo MD;  Location: [facility] CATH INVASIVE LOCATION;  Service: Cardiology   •  SECTION  ,    • CHOLECYSTECTOMY      + stones   • ENDOSCOPY     • ENDOSCOPY N/A 10/15/2019    Procedure: ESOPHAGOGASTRODUODENOSCOPY WITH BIOPSY AND ESOPHAGEAL DILATATION;  Surgeon: Ambrocio Solorzano MD;  Location: [facility] ENDOSCOPY;  Service: Gastroenterology   • HYSTERECTOMY     • SHOULDER ACROMIOPLASTY     • UPPER GASTROINTESTINAL ENDOSCOPY  10/15/2019     Family History   Problem Relation Age of Onset   • Heart disease Mother    • Heart disease Father    • Heart disease Brother    • Cirrhosis Neg Hx    • Liver cancer Neg Hx    • Liver disease Neg Hx    • Colon cancer Neg Hx    • Crohn's disease Neg Hx    • Ulcerative colitis Neg Hx    • Esophageal cancer Neg Hx    • Stomach cancer Neg Hx      Social History     Tobacco Use   • Smoking status: Current Every Day Smoker     Packs/day: 1.00     Types: Cigarettes     Start date:    • Smokeless tobacco: Never Used   Substance Use Topics   • Alcohol use: No     Frequency: Never     Comment: \"not in 30 years\"       Current Outpatient Medications:   •  ALBUTEROL SULFATE IN, Inhale. As needed every 4-6 hours, Disp: , Rfl:   •  aspirin 81 MG EC tablet, Take 1 tablet by mouth Daily., Disp: 90 tablet, Rfl: 2  •  isosorbide mononitrate (IMDUR) 120 MG 24 hr tablet, Take 1 tablet by mouth Every Morning., Disp: 90 tablet, Rfl: 2  •  Multiple Vitamins-Minerals (ALIVE WOMENS ENERGY PO), Take 1 tablet by mouth Daily., Disp: , Rfl:   •  Naproxen Sodium (ALEVE PO), Take  by mouth " "Daily., Disp: , Rfl:   •  nitroglycerin (NITROSTAT) 0.4 MG SL tablet, Place 0.4 mg under the tongue Every 5 (Five) Minutes As Needed for Chest Pain. Take no more than 3 doses in 15 minutes., Disp: , Rfl:   •  simvastatin (ZOCOR) 20 MG tablet, Take 1 tablet by mouth Every Night., Disp: 90 tablet, Rfl: 2  •  Tiotropium Bromide-Olodaterol (STIOLTO RESPIMAT IN), Inhale 2 puffs Daily., Disp: , Rfl:   •  umeclidinium-vilanterol (ANORO ELLIPTA) 62.5-25 MCG/INH aerosol powder  inhaler, Inhale 1 puff Daily., Disp: 60 each, Rfl: 6  •  Vitamins C E (VITAMIN C & E COMBINATION PO), Take  by mouth Daily., Disp: , Rfl:   •  pantoprazole (PROTONIX) 20 MG EC tablet, 1 tablet by mouth in the the evening, Disp: 90 tablet, Rfl: 1  •  pantoprazole (PROTONIX) 40 MG EC tablet, 1 tablet by mouth daily in the am 30 minutes before breakfast., Disp: 90 tablet, Rfl: 1    Allergies   Allergen Reactions   • Penicillins Anaphylaxis   • Coconut Swelling and Rash     Tongue swells   • Morphine Swelling     /66   Pulse 65   Temp 97.8 °F (36.6 °C)   Resp 20   Ht 165.1 cm (65\")   Wt 83.9 kg (185 lb)   LMP  (LMP Unknown)   BMI 30.79 kg/m²     Physical Exam   Constitutional: She is oriented to person, place, and time. She appears well-developed and well-nourished. No distress.   HENT:   Head: Normocephalic and atraumatic.   Right Ear: Hearing and external ear normal.   Left Ear: Hearing and external ear normal.   Nose: Nose normal.   Mouth/Throat: Oropharynx is clear and moist and mucous membranes are normal. Mucous membranes are not pale, not dry and not cyanotic. No oral lesions. No oropharyngeal exudate.   Eyes: Conjunctivae and EOM are normal. Right eye exhibits no discharge. Left eye exhibits no discharge.   Neck: Trachea normal. Neck supple. No JVD present. No edema present. No thyroid mass and no thyromegaly present.   Cardiovascular: Normal rate, regular rhythm, S2 normal and normal heart sounds. Exam reveals no gallop, no S3 and no " friction rub.   No murmur heard.  Pulmonary/Chest: Effort normal and breath sounds normal. No respiratory distress. She exhibits no tenderness.   Abdominal: Normal appearance and bowel sounds are normal. She exhibits no distension, no ascites and no mass. There is no splenomegaly or hepatomegaly. There is no tenderness. There is no rigidity, no rebound and no guarding. No hernia.     Vascular Status -  Her right foot exhibits no edema. Her left foot exhibits no edema.  Lymphadenopathy:     She has no cervical adenopathy.        Left: No supraclavicular adenopathy present.   Neurological: She is alert and oriented to person, place, and time. She has normal strength. No cranial nerve deficit or sensory deficit.   Skin: No rash noted. She is not diaphoretic. No cyanosis. No pallor. Nails show no clubbing.   Psychiatric: She has a normal mood and affect.   Nursing note and vitals reviewed.  Stigmata of chronic liver disease:  None.  Asterixis:  None.    Laboratory Tests:   Upon review of records:      Dated 8/5/2019 glucose 100 potassium 5.3 sodium 143 albumin 4.5 alkaline phosphatase 96 ALT 13 AST 19 total bilirubin 0.6 BUN 9 creatinine 0.62 WBC 11.3 hemoglobin 15.7 hematocrit 46.7 platelet count 224 MCV 84.2 BNP 92, EBV antibody IgM: Negative, hepatitis A IgM: Nonreactive, hepatitis B core IgM: Nonreactive, hepatitis B surface antigen: Nonreactive, hepatitis C antibody: Nonreactive, H. pylori breath test: Negative, TSH 2.19     Dated 9/6/2019 glucose 88 BUN 11 creatinine 0.84 sodium 144 potassium 4.1 chloride 106 CO2 29.3 calcium 9.3 albumin 3.98 ALT 9 AST 13 alkaline phosphatase 92 total bilirubin 0.5 WBC 9.77 hemoglobin 14.0 hematocrit 42.2 platelet count 194 MCV 95.0     Abdominal Imaging:  Upon review of records:     CT of chest pulmonary embolism with contrast dated 7/29/2019 reveals no evidence of pulmonary embolism.  Granulomatous changes with left side calcified mediastinal and hilar lymph nodes.  Severe  centrilobular emphysema.  Multiple scattered calcified granulomas both lungs.  Mild cardiomegaly with mild coronary artery calcifications.  No effusion.  Mediastinum with no masses.  No enlarged lymph nodes.  No fluid collections.  No pleural effusion.  No pleural mass or abnormal calcification.  No pneumothorax.  Major airways clear.  No intrinsic mass.  No evidence of aneurysm.  Stranding and inflammation surrounding the tail of the pancreas compatible with acute pancreatitis.  No acute bony abnormality.     Abdominal ultrasound dated 9/12/2019 reveals visualized pancreas is unremarkable.  The imaged portion of the abdominal aorta is nondilated.  The liver is homogenous.  There is no intrahepatic ductal dilatation or focal hepatic mass.  Hepatic flow is hepatopetal.  The imaged portion of the hepatic vessels and inferior vena cava are patent.  The common bile duct is normal, measuring 3.96 mm.  The kidneys demonstrate no evidence of hydronephrosis or solid renal mass.  The spleen is homogenous and measures 7.77 cm.    Procedures:  Upon review of records:    EGD dated 10/15/2019 reveals erosive distal esophagitis.  LA class C.  Small sliding hiatal hernia less than 3 cm.  Schatzki's ring.  Subtle concentric rings involving the mid and distal esophagus.  Status post serial dilation to 18 mm.  Erythematous gastritis.  Duodenum second portion biopsy reveals benign duodenal mucosa with no diagnostic abnormality.  Antrum body and fundus biopsy reveals reactive gastropathy.  Mid and distal esophagus biopsies revealed reactive squamous mucosa.    Assessment:      ICD-10-CM ICD-9-CM   1. Colon cancer screening Z12.11 V76.51   2. Heartburn R12 787.1   3. Esophagitis, erosive K22.10 530.19   4. Chronic gastritis without bleeding, unspecified gastritis type K29.50 535.10     Plan/  Patient Instructions   1. Antireflux measures: Avoid fried, fatty foods, alcohol, chocolate, coffee, tea,  soft drinks, peppermint and spearmint,  spicy foods, tomatoes and tomato based foods, onion based foods, and smoking. Other antireflux measures include weight reduction if overweight, avoiding tight clothing around the abdomen, elevating the head of the bed 6 inches with blocks under the head board, and don't drink or eat before going to bed and avoid lying down immediately after meals.  2. Pantoprazole 40 mg 1 by mouth in the am 30 minutes before breakfast.  3. Pantoprazole 20 mg 1 po by mouth in the evening.   4. High fiber diet with liberal water intake.   5. Colonoscopy: Description of the procedure, risks, benefits, alternatives and options, including nonoperative options, were discussed with the patient in detail. The patient understands and does not want to schedule at this time.   6. Follow up: The patient wants to call back     DEWAYNE Kim

## 2020-06-01 NOTE — PATIENT INSTRUCTIONS
1. Antireflux measures: Avoid fried, fatty foods, alcohol, chocolate, coffee, tea,  soft drinks, peppermint and spearmint, spicy foods, tomatoes and tomato based foods, onion based foods, and smoking. Other antireflux measures include weight reduction if overweight, avoiding tight clothing around the abdomen, elevating the head of the bed 6 inches with blocks under the head board, and don't drink or eat before going to bed and avoid lying down immediately after meals.  2. Pantoprazole 40 mg 1 by mouth in the am 30 minutes before breakfast.  3. Pantoprazole 20 mg 1 po by mouth in the evening.   4. High fiber diet with liberal water intake.   5. Colonoscopy: Description of the procedure, risks, benefits, alternatives and options, including nonoperative options, were discussed with the patient in detail. The patient understands and does not want to schedule at this time.   6. Follow up: The patient wants to call back

## 2020-06-04 ENCOUNTER — OFFICE VISIT (OUTPATIENT)
Dept: PULMONOLOGY | Facility: CLINIC | Age: 65
End: 2020-06-04

## 2020-06-04 VITALS
HEART RATE: 76 BPM | TEMPERATURE: 98.6 F | HEIGHT: 65 IN | BODY MASS INDEX: 30.82 KG/M2 | WEIGHT: 185 LBS | SYSTOLIC BLOOD PRESSURE: 138 MMHG | OXYGEN SATURATION: 95 % | DIASTOLIC BLOOD PRESSURE: 72 MMHG

## 2020-06-04 DIAGNOSIS — J43.2 CENTRILOBULAR EMPHYSEMA (HCC): Primary | ICD-10-CM

## 2020-06-04 DIAGNOSIS — F17.210 CIGARETTE NICOTINE DEPENDENCE WITHOUT COMPLICATION: ICD-10-CM

## 2020-06-04 DIAGNOSIS — Z12.2 ENCOUNTER FOR SCREENING FOR LUNG CANCER: ICD-10-CM

## 2020-06-04 PROBLEM — J44.9 CHRONIC OBSTRUCTIVE PULMONARY DISEASE: Status: RESOLVED | Noted: 2020-06-01 | Resolved: 2020-06-04

## 2020-06-04 PROCEDURE — 99407 BEHAV CHNG SMOKING > 10 MIN: CPT | Performed by: NURSE PRACTITIONER

## 2020-06-04 PROCEDURE — 99214 OFFICE O/P EST MOD 30 MIN: CPT | Performed by: NURSE PRACTITIONER

## 2020-06-04 NOTE — PROGRESS NOTES
Have you had the Influenza Vaccine? no   Would you like to receive this Vaccine today? no    Have you had the Pneumonia Vaccine?  no  Would you like to receive this Vaccine today? no    Are you a current smoker? yes   How much? 1 PPD      Subjective    Itzel Hagen presents for the following Bronchitis      History of Present Illness     Ms. Hagen is a 64 year old female with a medical history significant for chronic bronchitis, tobacco abuse, GERD, and hyperlipidemia.    She presents today for routine follow up on chronic bronchitis.  She states that overall she has been doing well since her last visit.  She reports that she is using Anoro, once daily and albuterol as needed.  She is currently not on any oxygen.  She reports that her shortness of breath and wheezing have been at baseline. She is still smoking about 1 ppd.    Review of Systems   Constitutional: Negative for activity change, fatigue and unexpected weight change.   HENT: Negative for congestion, postnasal drip and rhinorrhea.    Respiratory: Positive for cough, shortness of breath and wheezing. Negative for apnea and chest tightness.    Cardiovascular: Negative for chest pain and palpitations.   Gastrointestinal: Negative for nausea.   Allergic/Immunologic: Negative for environmental allergies.   Psychiatric/Behavioral: Negative for agitation and confusion.       Active Problems:  Problem List Items Addressed This Visit        Respiratory    Centrilobular emphysema (CMS/HCC) - Primary    Relevant Medications    predniSONE (DELTASONE) 20 MG tablet    umeclidinium-vilanterol (ANORO ELLIPTA) 62.5-25 MCG/INH aerosol powder  inhaler    ipratropium-albuterol (DUO-NEB) 0.5-2.5 mg/3 ml nebulizer       Other    Cigarette nicotine dependence without complication    Relevant Orders    CT chest low dose wo      Other Visit Diagnoses     Encounter for screening for lung cancer        Relevant Orders    CT chest low dose wo          Past Medical  "History:  Past Medical History:   Diagnosis Date   • Acute pancreatitis    • Angina, class III (CMS/HCC)    • Back pain    • Bilateral cataracts    • Chest pain    • Chronic bronchitis (CMS/HCC)    • Cigarette nicotine dependence    • COPD (chronic obstructive pulmonary disease) (CMS/HCC)    • Current smoker    • Full dentures    • GERD (gastroesophageal reflux disease)    • H/O cardiac catheterization 09/06/2019    Dr. Izquierdo, Conclusion: 1.Overall mild coronary artery disease with right dominant system.   • Heart palpitations    • History of nuclear stress test 08/28/2019    ·Left ventricular ejection fraction is hyperdynamic (Calculated EF > 70%). Impressions are consistent with a low risk study.   • Hyperlipidemia    • PONV (postoperative nausea and vomiting)    • Precordial chest pain    • SOB (shortness of breath)        Family History:  Family History   Problem Relation Age of Onset   • Heart disease Mother    • Heart disease Father    • Heart disease Brother    • Cirrhosis Neg Hx    • Liver cancer Neg Hx    • Liver disease Neg Hx    • Colon cancer Neg Hx    • Crohn's disease Neg Hx    • Ulcerative colitis Neg Hx    • Esophageal cancer Neg Hx    • Stomach cancer Neg Hx        Social History:  Social History     Tobacco Use   • Smoking status: Current Every Day Smoker     Packs/day: 1.00     Types: Cigarettes     Start date: 1971   • Smokeless tobacco: Never Used   Substance Use Topics   • Alcohol use: No     Frequency: Never     Comment: \"not in 30 years\"       Current Medications:  Current Outpatient Medications   Medication Sig Dispense Refill   • ALBUTEROL SULFATE IN Inhale. As needed every 4-6 hours     • aspirin 81 MG EC tablet Take 1 tablet by mouth Daily. 90 tablet 2   • isosorbide mononitrate (IMDUR) 120 MG 24 hr tablet Take 1 tablet by mouth Every Morning. 90 tablet 2   • Multiple Vitamins-Minerals (ALIVE WOMENS ENERGY PO) Take 1 tablet by mouth Daily.     • Naproxen Sodium (ALEVE PO) Take  by mouth " "Daily.     • nitroglycerin (NITROSTAT) 0.4 MG SL tablet Place 0.4 mg under the tongue Every 5 (Five) Minutes As Needed for Chest Pain. Take no more than 3 doses in 15 minutes.     • pantoprazole (PROTONIX) 20 MG EC tablet 1 tablet by mouth in the the evening 90 tablet 1   • pantoprazole (PROTONIX) 40 MG EC tablet 1 tablet by mouth daily in the am 30 minutes before breakfast. 90 tablet 1   • simvastatin (ZOCOR) 20 MG tablet Take 1 tablet by mouth Every Night. 90 tablet 2   • umeclidinium-vilanterol (ANORO ELLIPTA) 62.5-25 MCG/INH aerosol powder  inhaler Inhale 1 puff Daily. 60 each 6   • Vitamins C E (VITAMIN C & E COMBINATION PO) Take  by mouth Daily.     • doxycycline (VIBRAMYCIN) 100 MG capsule Take 1 capsule by mouth 2 (Two) Times a Day. 20 capsule 0   • ipratropium-albuterol (DUO-NEB) 0.5-2.5 mg/3 ml nebulizer Take 3 mL by nebulization 4 (Four) Times a Day As Needed for Wheezing. 120 mL 5   • predniSONE (DELTASONE) 20 MG tablet Take 2 tablets by mouth Daily. 10 tablet 0     No current facility-administered medications for this visit.        Allergies:  Allergies   Allergen Reactions   • Penicillins Anaphylaxis   • Coconut Swelling and Rash     Tongue swells   • Morphine Swelling       Vitals:  /72   Pulse 76   Temp 98.6 °F (37 °C) (Temporal)   Ht 165.1 cm (65\")   Wt 83.9 kg (185 lb)   LMP  (LMP Unknown)   SpO2 95%   BMI 30.79 kg/m²     Imaging:    Imaging Results (Most Recent)     None          Pulmonary Functions Testing Results:    FEV1   Date Value Ref Range Status   08/08/2019 1.44 liters Final     FVC   Date Value Ref Range Status   08/08/2019 2.11 liters Final       Results for orders placed or performed during the hospital encounter of 10/15/19   Tissue Pathology Exam   Result Value Ref Range    Case Report       Surgical Pathology Report                         Case: [id number]                                  Authorizing Provider:  Ambrocio Solorzano MD         Collected:           10/15/2019 " 09:05 AM          Ordering Location:     [facility]    Received:            10/15/2019 12:27 PM                                 [facility]                                                                    Pathologist:           Jorge Stuart MD                                                     Specimens:   1) - Small Intestine, second portion duodenum                                                       2) - Gastric, Antrum, antrum, fundus, body                                                          3) - Esophagus, MID AND DISTAL ESOPHAGUS                                                   Final Diagnosis       See Scanned Report           Objective   Physical Exam     GENERAL APPEARANCE: Well developed, well nourished, alert and cooperative, and appears to be in no acute distress.    HEAD: normocephalic. Atraumatic.    EYES: PERRL, EOMI. Vision is grossly intact.    THROAT: Oral cavity and pharynx normal. No inflammation, swelling, exudate, or lesions.     NECK: Neck supple.  No thyromegaly.    CARDIAC: Normal S1 and S2. No S3, S4 or murmurs. Rhythm is regular.     RESPIRATORY:Bilateral air entry positive. Bilateral diminished breath sounds. No wheezing, crackles or rhonchi noted.    GI: Positive bowel sounds. Soft, nondistended, nontender.     MUSCULOSKELETAL: No significant deformity or joint abnormality. No edema. Peripheral pulses intact. No varicosities.    NEUROLOGICAL: Strength and sensation symmetric and intact throughout.     PSYCHIATRIC: The mental examination revealed the patient was oriented to person, place, and time.       Assessment/Plan        COPD:  -PFT showed a moderately severe obstruction with no bronchodilator response.  Air trapping was noted.  -Alpha 1 antitrypsin genotype is MM  -Continue Anoro once daily.  -Continue albuterol PRN.  -Will send refills for duonebs PRN.    -Will also send COPD emergency kit to pharmacy as she is a  and is often on the  road.    Low-dose CT scan for lung cancer screening counseling:  I have discussed with the patient that screening individuals between the ages of 55 and 74 years, with a long smoking exposure, and quit smoking no more than 15 years prior to being screened, allows for timely intervention. Compared to people who were not screened, 16% fewer people  from lung cancer who were screened. Research has found that current screening with sputum cytology and chest radiograph does not have an overall benefit. I have also discussed that screening with CT has been associated with false positive test which may result in unnecessary treatment. For each true positive, there can be up to 19 false positive results. Other concerns related to screening include radiation exposure and the cost of testing and follow-up. False reassurance from false-negative findings, over-diagnosis, short-term anxiety/distress and increased rates of incidental findings are also risks. Radiation exposure from repeated screening studies may induce cancer formation in a small percentage or screened subjects, but this risk is mitigated by the high prevalence of lung cancer in the population being screened and the reduction in mortality due to screening.     Will order a low dose CT Chest for lung cancer screening.      Current smoker:  -Down to 1 ppd from 3 ppd.  She states that she is trying to quit.    [facility]  reports that she has been smoking cigarettes. She started smoking about 49 years ago. She has been smoking about 1.00 pack per day. She has never used smokeless tobacco.. I have educated her on the risk of diseases from using tobacco products such as cancer, COPD and heart diease.     I advised her to quit and she is willing to quit. We have discussed the following method/s for tobacco cessation:  Counseling.  Together we have set a quit date for 1 month from today.  She will follow up with me in a few months or sooner to check on her  progress.    I spent >10 minutes counseling the patient.         -Patient's Body mass index is 30.79 kg/m². BMI is above normal parameters. Recommendations include: exercise counseling and nutrition counseling.        ICD-10-CM ICD-9-CM   1. Centrilobular emphysema (CMS/HCC) J43.2 492.8   2. Cigarette nicotine dependence without complication F17.210 305.1   3. Encounter for screening for lung cancer Z12.2 V76.0       Return in about 6 months (around 12/4/2020).

## 2020-06-05 RX ORDER — PREDNISONE 20 MG/1
40 TABLET ORAL DAILY
Qty: 10 TABLET | Refills: 0 | Status: SHIPPED | OUTPATIENT
Start: 2020-06-05 | End: 2020-11-23 | Stop reason: SDUPTHER

## 2020-06-05 RX ORDER — DOXYCYCLINE HYCLATE 100 MG/1
100 CAPSULE ORAL 2 TIMES DAILY
Qty: 20 CAPSULE | Refills: 0 | Status: SHIPPED | OUTPATIENT
Start: 2020-06-05 | End: 2020-11-23 | Stop reason: SDUPTHER

## 2020-06-05 RX ORDER — IPRATROPIUM BROMIDE AND ALBUTEROL SULFATE 2.5; .5 MG/3ML; MG/3ML
3 SOLUTION RESPIRATORY (INHALATION) 4 TIMES DAILY PRN
Qty: 120 ML | Refills: 5 | Status: SHIPPED | OUTPATIENT
Start: 2020-06-05 | End: 2021-11-15 | Stop reason: SDUPTHER

## 2020-06-10 ENCOUNTER — HOSPITAL ENCOUNTER (OUTPATIENT)
Dept: CT IMAGING | Facility: HOSPITAL | Age: 65
Discharge: HOME | End: 2020-06-10
Admitting: NURSE PRACTITIONER

## 2020-06-10 DIAGNOSIS — F17.210 CIGARETTE NICOTINE DEPENDENCE WITHOUT COMPLICATION: ICD-10-CM

## 2020-06-10 DIAGNOSIS — Z12.2 ENCOUNTER FOR SCREENING FOR LUNG CANCER: ICD-10-CM

## 2020-06-10 PROCEDURE — 71250 CT THORAX DX C-: CPT | Performed by: RADIOLOGY

## 2020-06-10 PROCEDURE — G0297 LDCT FOR LUNG CA SCREEN: HCPCS

## 2020-06-15 ENCOUNTER — TELEPHONE (OUTPATIENT)
Dept: PULMONOLOGY | Facility: CLINIC | Age: 65
End: 2020-06-15

## 2020-06-15 DIAGNOSIS — K22.10 ESOPHAGITIS, EROSIVE: ICD-10-CM

## 2020-06-15 DIAGNOSIS — K29.50 CHRONIC GASTRITIS WITHOUT BLEEDING, UNSPECIFIED GASTRITIS TYPE: Chronic | ICD-10-CM

## 2020-06-15 DIAGNOSIS — R12 HEARTBURN: Chronic | ICD-10-CM

## 2020-06-15 RX ORDER — PANTOPRAZOLE SODIUM 40 MG/1
TABLET, DELAYED RELEASE ORAL
Qty: 90 TABLET | Refills: 1 | Status: SHIPPED | OUTPATIENT
Start: 2020-06-15 | End: 2021-03-08 | Stop reason: SDUPTHER

## 2020-06-15 RX ORDER — SIMVASTATIN 20 MG
20 TABLET ORAL NIGHTLY
Qty: 90 TABLET | Refills: 0 | Status: SHIPPED | OUTPATIENT
Start: 2020-06-15 | End: 2020-10-26

## 2020-06-15 RX ORDER — ISOSORBIDE MONONITRATE 120 MG/1
120 TABLET, EXTENDED RELEASE ORAL EVERY MORNING
Qty: 90 TABLET | Refills: 0 | Status: SHIPPED | OUTPATIENT
Start: 2020-06-15 | End: 2020-10-26

## 2020-06-15 RX ORDER — PANTOPRAZOLE SODIUM 20 MG/1
TABLET, DELAYED RELEASE ORAL
Qty: 90 TABLET | Refills: 1 | Status: SHIPPED | OUTPATIENT
Start: 2020-06-15 | End: 2021-01-11

## 2020-10-26 RX ORDER — SIMVASTATIN 20 MG
TABLET ORAL
Qty: 90 TABLET | Refills: 3 | Status: SHIPPED | OUTPATIENT
Start: 2020-10-26 | End: 2021-09-13

## 2020-10-26 RX ORDER — ISOSORBIDE MONONITRATE 120 MG/1
TABLET, EXTENDED RELEASE ORAL
Qty: 90 TABLET | Refills: 3 | Status: SHIPPED | OUTPATIENT
Start: 2020-10-26 | End: 2021-09-13

## 2020-11-23 RX ORDER — PREDNISONE 20 MG/1
40 TABLET ORAL DAILY
Qty: 10 TABLET | Refills: 0 | Status: SHIPPED | OUTPATIENT
Start: 2020-11-23 | End: 2021-03-25

## 2020-11-23 RX ORDER — DOXYCYCLINE HYCLATE 100 MG/1
100 CAPSULE ORAL 2 TIMES DAILY
Qty: 20 CAPSULE | Refills: 0 | Status: SHIPPED | OUTPATIENT
Start: 2020-11-23 | End: 2021-03-25

## 2021-01-08 DIAGNOSIS — K22.10 ESOPHAGITIS, EROSIVE: ICD-10-CM

## 2021-01-08 DIAGNOSIS — K29.50 CHRONIC GASTRITIS WITHOUT BLEEDING, UNSPECIFIED GASTRITIS TYPE: Chronic | ICD-10-CM

## 2021-01-08 DIAGNOSIS — R12 HEARTBURN: Chronic | ICD-10-CM

## 2021-01-11 RX ORDER — PANTOPRAZOLE SODIUM 20 MG/1
TABLET, DELAYED RELEASE ORAL
Qty: 90 TABLET | Refills: 0 | Status: SHIPPED | OUTPATIENT
Start: 2021-01-11 | End: 2021-03-30

## 2021-02-02 ENCOUNTER — OFFICE VISIT (OUTPATIENT)
Dept: CARDIOLOGY | Facility: CLINIC | Age: 66
End: 2021-02-02

## 2021-02-02 VITALS
BODY MASS INDEX: 30.82 KG/M2 | HEIGHT: 65 IN | WEIGHT: 185 LBS | SYSTOLIC BLOOD PRESSURE: 128 MMHG | DIASTOLIC BLOOD PRESSURE: 82 MMHG | HEART RATE: 74 BPM

## 2021-02-02 DIAGNOSIS — I25.83 CORONARY ARTERY DISEASE DUE TO LIPID RICH PLAQUE: Primary | ICD-10-CM

## 2021-02-02 DIAGNOSIS — I25.10 CORONARY ARTERY DISEASE DUE TO LIPID RICH PLAQUE: Primary | ICD-10-CM

## 2021-02-02 PROCEDURE — 99442 PR PHYS/QHP TELEPHONE EVALUATION 11-20 MIN: CPT | Performed by: PHYSICIAN ASSISTANT

## 2021-02-02 NOTE — PROGRESS NOTES
You have chosen to receive care through a telephone visit. Do you consent to use a telephone visit for your medical care today? Yes    I was on the phone with the patient for 11 minutes    Maude Snow APRN  Itzel Hagen  1955 02/02/2021    Patient Active Problem List   Diagnosis   • Precordial pain   • Angina, class III (CMS/HCC)   • Cigarette nicotine dependence without complication   • Family history of premature coronary artery disease   • Heartburn   • Acute pancreatitis without infection or necrosis   • Abnormal CT of the abdomen   • Chest pain   • Centrilobular emphysema (CMS/HCC)   • Chronic fatigue syndrome   • Gastro-esophageal reflux disease without esophagitis   • Unstable angina pectoris due to coronary arteriosclerosis (CMS/HCC)   • Chronic pancreatitis (CMS/HCC)   • Esophagitis, erosive   • Gastritis, erosive       Dear Maude Snow APRN:    Subjective     History of Present Illness:    Chief Complaint   Patient presents with   • Follow-up     6 month   • Med Management     ACTUAL       Itzel Hagen is a pleasant 65 y.o. female with a past medical history significant for nonobstructive CAD with 20% stenosis in the RCA. She does smoke tobacco, and does have premature family history with her mother having a CABG but does have a brother who has received stents and is 40 years of age. She does not have diabetes. She is on the phone for routine followup.       Patient reports that she has been doing well. She reports that her blood pressure has been well controlled averaging around 120/70 mmHg. Patient denies any chest pain, shortness of breath, palpitations, dizziness, syncope or near syncope.           Allergies   Allergen Reactions   • Penicillins Anaphylaxis   • Coconut Swelling and Rash     Tongue swells   • Morphine Swelling   :      Current Outpatient Medications:   •  ALBUTEROL SULFATE IN, Inhale. As needed every 4-6 hours, Disp: , Rfl:   •  aspirin 81 MG EC  "tablet, Take 1 tablet by mouth Daily., Disp: 90 tablet, Rfl: 2  •  doxycycline (VIBRAMYCIN) 100 MG capsule, Take 1 capsule by mouth 2 (Two) Times a Day., Disp: 20 capsule, Rfl: 0  •  ipratropium-albuterol (DUO-NEB) 0.5-2.5 mg/3 ml nebulizer, Take 3 mL by nebulization 4 (Four) Times a Day As Needed for Wheezing., Disp: 120 mL, Rfl: 5  •  isosorbide mononitrate (IMDUR) 120 MG 24 hr tablet, TAKE 1 TABLET EVERY MORNING, Disp: 90 tablet, Rfl: 3  •  Multiple Vitamins-Minerals (ALIVE WOMENS ENERGY PO), Take 1 tablet by mouth Daily., Disp: , Rfl:   •  Naproxen Sodium (ALEVE PO), Take  by mouth Daily., Disp: , Rfl:   •  nitroglycerin (NITROSTAT) 0.4 MG SL tablet, Place 0.4 mg under the tongue Every 5 (Five) Minutes As Needed for Chest Pain. Take no more than 3 doses in 15 minutes., Disp: , Rfl:   •  pantoprazole (PROTONIX) 20 MG EC tablet, TAKE 1 TABLET IN THE EVENING, Disp: 90 tablet, Rfl: 0  •  pantoprazole (PROTONIX) 40 MG EC tablet, 1 tablet by mouth daily in the am 30 minutes before breakfast., Disp: 90 tablet, Rfl: 1  •  predniSONE (DELTASONE) 20 MG tablet, Take 2 tablets by mouth Daily., Disp: 10 tablet, Rfl: 0  •  simvastatin (ZOCOR) 20 MG tablet, TAKE 1 TABLET EVERY NIGHT, Disp: 90 tablet, Rfl: 3  •  umeclidinium-vilanterol (ANORO ELLIPTA) 62.5-25 MCG/INH aerosol powder  inhaler, Inhale 1 puff Daily., Disp: 60 each, Rfl: 6  •  Vitamins C E (VITAMIN C & E COMBINATION PO), Take  by mouth Daily., Disp: , Rfl:     The following portions of the patient's history were reviewed and updated as appropriate: allergies, current medications, past family history, past medical history, past social history, past surgical history and problem list.    Social History     Tobacco Use   • Smoking status: Current Every Day Smoker     Packs/day: 1.00     Types: Cigarettes     Start date: 1971   • Smokeless tobacco: Never Used   Substance Use Topics   • Alcohol use: No     Frequency: Never     Comment: \"not in 30 years\"   • Drug use: No " "      ROS    Objective   Vitals:    02/02/21 1452   BP: 128/82   BP Location: Right arm   Patient Position: Sitting   Cuff Size: Adult   Pulse: 74   Weight: 83.9 kg (185 lb)   Height: 165.1 cm (65\")     Body mass index is 30.79 kg/m².    Physical Exam    Lab Results   Component Value Date     09/06/2019    K 4.1 09/06/2019     09/06/2019    CO2 29.3 (H) 09/06/2019    BUN 11 09/06/2019    CREATININE 0.84 09/06/2019    GLUCOSE 88 09/06/2019    CALCIUM 9.3 09/06/2019    AST 13 09/06/2019    ALT 9 09/06/2019    ALKPHOS 92 09/06/2019     No results found for: CKTOTAL  Lab Results   Component Value Date    WBC 9.77 09/06/2019    HGB 14.0 09/06/2019    HCT 42.2 09/06/2019     09/06/2019     No results found for: INR  No results found for: MG  No results found for: TSH, PSA, CHLPL, TRIG, HDL, LDL   No results found for: BNP    During this visit the following were done:  Labs Reviewed [x]    Labs Ordered []    Radiology Reports Reviewed [x]    Radiology Ordered []    PCP Records Reviewed []    Referring Provider Records Reviewed []    ER Records Reviewed []    Hospital Records Reviewed []    History Obtained From Family []    Radiology Images Reviewed []    Other Reviewed []    Records Requested []       Procedures    Assessment/Plan    Diagnosis Plan   1. Coronary artery disease due to lipid rich plaque  Comprehensive Metabolic Panel    Lipid Panel            Recommendations:  1. CAD  1. Clinically doing well and denies any symptoms  2. I will check a CMP and lipid since this hasnt been checked in a year.   3. Continue aspirin and simvastatin.     No follow-ups on file.    As always, I appreciate very much the opportunity to participate in the cardiovascular care of your patients.      With Best Regards,    Sebastian Fuchs PA-C            "

## 2021-02-22 DIAGNOSIS — Z23 IMMUNIZATION DUE: ICD-10-CM

## 2021-03-08 RX ORDER — PANTOPRAZOLE SODIUM 40 MG/1
40 TABLET, DELAYED RELEASE ORAL
Qty: 90 TABLET | Refills: 0 | Status: SHIPPED | OUTPATIENT
Start: 2021-03-08 | End: 2021-04-19 | Stop reason: SDUPTHER

## 2021-03-08 NOTE — TELEPHONE ENCOUNTER
Patient called for new script for Pantoprazole 40.  She has the 20 mg pills.  She is on 40 in the am and 20 at night.

## 2021-03-25 ENCOUNTER — OFFICE VISIT (OUTPATIENT)
Dept: PULMONOLOGY | Facility: CLINIC | Age: 66
End: 2021-03-25

## 2021-03-25 VITALS
HEART RATE: 69 BPM | DIASTOLIC BLOOD PRESSURE: 92 MMHG | BODY MASS INDEX: 29.49 KG/M2 | TEMPERATURE: 97.3 F | OXYGEN SATURATION: 98 % | HEIGHT: 65 IN | SYSTOLIC BLOOD PRESSURE: 158 MMHG | WEIGHT: 177 LBS

## 2021-03-25 DIAGNOSIS — J43.2 CENTRILOBULAR EMPHYSEMA (HCC): Primary | ICD-10-CM

## 2021-03-25 DIAGNOSIS — F17.210 CIGARETTE NICOTINE DEPENDENCE WITHOUT COMPLICATION: ICD-10-CM

## 2021-03-25 PROCEDURE — 99214 OFFICE O/P EST MOD 30 MIN: CPT | Performed by: NURSE PRACTITIONER

## 2021-03-25 NOTE — PROGRESS NOTES
Have you had the Influenza Vaccine? no   Would you like to receive this Vaccine today? no    Have you had the Pneumonia Vaccine?  no  Would you like to receive this Vaccine today? no    Are you a current smoker? yes   How much? A little less than a pack and a half a day.      Subjective    Itzel Hagen presents for the following Emphysema      History of Present Illness     Ms. Hagen is a 65-year-old female with a medical history significant for COPD, GERD, hyperlipidemia and tobacco abuse.    She presents today for routine follow-up on emphysema.  She states that overall she has been doing well since her last visit.  At today's visit she denies any shortness of breath but does complain of a cough that is at baseline.  She is currently on Anoro once daily and uses albuterol every 4 hours as needed.  She is currently smoking a little less than a pack and 1/2/day.    Review of Systems   Constitutional: Negative for activity change, fatigue and unexpected weight change.   HENT: Negative for congestion, postnasal drip and rhinorrhea.    Respiratory: Positive for cough. Negative for apnea, chest tightness, shortness of breath and wheezing.    Cardiovascular: Negative for chest pain and palpitations.   Gastrointestinal: Negative for nausea.   Allergic/Immunologic: Negative for environmental allergies.   Psychiatric/Behavioral: Negative for agitation and confusion.       Active Problems:  Problem List Items Addressed This Visit        Pulmonary and Pneumonias    Centrilobular emphysema (CMS/HCC) - Primary    Relevant Medications    predniSONE (DELTASONE) 20 MG tablet       Tobacco    Cigarette nicotine dependence without complication          Past Medical History:  Past Medical History:   Diagnosis Date   • Acute pancreatitis    • Angina, class III (CMS/HCC)    • Back pain    • Bilateral cataracts    • Chest pain    • Chronic bronchitis (CMS/HCC)    • Cigarette nicotine dependence    • COPD (chronic obstructive  "pulmonary disease) (CMS/[facility])    • Current smoker    • Full dentures    • GERD (gastroesophageal reflux disease)    • H/O cardiac catheterization 09/06/2019    Dr. Izquierdo, Conclusion: 1.Overall mild coronary artery disease with right dominant system.   • Heart palpitations    • History of nuclear stress test 08/28/2019    ·Left ventricular ejection fraction is hyperdynamic (Calculated EF > 70%). Impressions are consistent with a low risk study.   • Hyperlipidemia    • PONV (postoperative nausea and vomiting)    • Precordial chest pain    • SOB (shortness of breath)        Family History:  Family History   Problem Relation Age of Onset   • Heart disease Mother    • Heart disease Father    • Heart disease Brother    • Cirrhosis Neg Hx    • Liver cancer Neg Hx    • Liver disease Neg Hx    • Colon cancer Neg Hx    • Crohn's disease Neg Hx    • Ulcerative colitis Neg Hx    • Esophageal cancer Neg Hx    • Stomach cancer Neg Hx        Social History:  Social History     Tobacco Use   • Smoking status: Current Every Day Smoker     Packs/day: 1.50     Types: Cigarettes     Start date: 1971   • Smokeless tobacco: Never Used   Substance Use Topics   • Alcohol use: No     Comment: \"not in 30 years\"       Current Medications:  Current Outpatient Medications   Medication Sig Dispense Refill   • ALBUTEROL SULFATE IN Inhale. As needed every 4-6 hours     • aspirin 81 MG EC tablet Take 1 tablet by mouth Daily. 90 tablet 2   • ipratropium-albuterol (DUO-NEB) 0.5-2.5 mg/3 ml nebulizer Take 3 mL by nebulization 4 (Four) Times a Day As Needed for Wheezing. 120 mL 5   • isosorbide mononitrate (IMDUR) 120 MG 24 hr tablet TAKE 1 TABLET EVERY MORNING 90 tablet 3   • Multiple Vitamins-Minerals (ALIVE WOMENS ENERGY PO) Take 1 tablet by mouth Daily.     • Naproxen Sodium (ALEVE PO) Take  by mouth Daily.     • nitroglycerin (NITROSTAT) 0.4 MG SL tablet Place 0.4 mg under the tongue Every 5 (Five) Minutes As Needed for Chest Pain. Take no more " "than 3 doses in 15 minutes.     • pantoprazole (PROTONIX) 20 MG EC tablet TAKE 1 TABLET IN THE EVENING 90 tablet 0   • pantoprazole (PROTONIX) 40 MG EC tablet Take 1 tablet by mouth Every Morning Before Breakfast. Take 1 tablet 30 minutes before breakfast daily. 90 tablet 0   • simvastatin (ZOCOR) 20 MG tablet TAKE 1 TABLET EVERY NIGHT 90 tablet 3   • umeclidinium-vilanterol (ANORO ELLIPTA) 62.5-25 MCG/INH aerosol powder  inhaler Inhale 1 puff Daily. 60 each 6   • Vitamins C E (VITAMIN C & E COMBINATION PO) Take  by mouth Daily.     • doxycycline (VIBRAMYCIN) 100 MG capsule Take 1 capsule by mouth 2 (Two) Times a Day. 20 capsule 0   • predniSONE (DELTASONE) 20 MG tablet Take 2 tablets by mouth Daily. 10 tablet 0     No current facility-administered medications for this visit.       Allergies:  Allergies   Allergen Reactions   • Penicillins Anaphylaxis   • Coconut Swelling and Rash     Tongue swells   • Morphine Swelling       Vitals:  /92   Pulse 69   Temp 97.3 °F (36.3 °C) (Temporal)   Ht 165.1 cm (65\")   Wt 80.3 kg (177 lb)   LMP  (LMP Unknown)   SpO2 98%   BMI 29.45 kg/m²     Imaging:    Imaging Results (Most Recent)     None          Pulmonary Functions Testing Results:    FEV1   Date Value Ref Range Status   08/08/2019 1.44 liters Final     FVC   Date Value Ref Range Status   08/08/2019 2.11 liters Final       Results for orders placed or performed during the hospital encounter of 10/15/19   Tissue Pathology Exam    Specimen: A: Small Intestine; Tissue    B: Gastric, Antrum; Tissue    C: Esophagus; Tissue   Result Value Ref Range    Case Report       Surgical Pathology Report                         Case: [id number]                                  Authorizing Provider:  Ambrocio Solorzano MD         Collected:           10/15/2019 09:05 AM          Ordering Location:     [facility]    Received:            10/15/2019 12:27 PM                                 [facility]                       "                                              Pathologist:           Jorge Stuart MD                                                     Specimens:   1) - Small Intestine, second portion duodenum                                                       2) - Gastric, Antrum, antrum, fundus, body                                                          3) - Esophagus, MID AND DISTAL ESOPHAGUS                                                   Final Diagnosis       See Scanned Report           Objective   Physical Exam     GENERAL APPEARANCE: Well developed, well nourished, alert and cooperative, and appears to be in no acute distress.    HEAD: normocephalic. Atraumatic.    EYES: PERRL, EOMI. Vision is grossly intact.    THROAT: Oral cavity and pharynx normal. No inflammation, swelling, exudate, or lesions.     NECK: Neck supple.  No thyromegaly.    CARDIAC: Normal S1 and S2. No S3, S4 or murmurs. Rhythm is regular.     RESPIRATORY:Bilateral air entry positive. Bilateral diminished breath sounds. No wheezing, crackles or rhonchi noted.    GI: Positive bowel sounds. Soft, nondistended, nontender.     MUSCULOSKELETAL: No significant deformity or joint abnormality. No edema. Peripheral pulses intact. No varicosities.    NEUROLOGICAL: Strength and sensation symmetric and intact throughout.     PSYCHIATRIC: The mental examination revealed the patient was oriented to person, place, and time.       Assessment/Plan        COPD:  PFT showed a moderately severe obstruction with no significant bronchodilator response.  -Continue Anoro once daily.  -Continue albuterol every 4 hours as needed.  -Continue duo nebs every 4 hours as needed.    -Due to her job we will send a refill for prednisone and doxycycline for her COPD rescue kit.      -PFT showed a moderately severe obstruction with no bronchodilator response.  Air trapping was noted.  -Alpha 1 antitrypsin genotype is MM  -Continue Anoro once daily.  -Continue albuterol  PRN.  -Will send refills for duonebs PRN.          Current smoker:  -Currently smoking a little less than a pack and 1/2/day.    Itzel Hagen  reports that she has been smoking cigarettes. She started smoking about 50 years ago. She has been smoking about 1.50 packs per day. She has never used smokeless tobacco.. I have educated her on the risk of diseases from using tobacco products such as cancer, COPD and heart disease.     I advised her to quit and she is not willing to quit.        ICD-10-CM ICD-9-CM   1. Centrilobular emphysema (CMS/[facility])  J43.2 492.8   2. Cigarette nicotine dependence without complication  F17.210 305.1       Return in about 6 months (around 9/25/2021).

## 2021-03-30 DIAGNOSIS — K22.10 ESOPHAGITIS, EROSIVE: ICD-10-CM

## 2021-03-30 DIAGNOSIS — K29.50 CHRONIC GASTRITIS WITHOUT BLEEDING, UNSPECIFIED GASTRITIS TYPE: Chronic | ICD-10-CM

## 2021-03-30 DIAGNOSIS — R12 HEARTBURN: Chronic | ICD-10-CM

## 2021-03-30 RX ORDER — DOXYCYCLINE HYCLATE 100 MG/1
100 CAPSULE ORAL 2 TIMES DAILY
Qty: 20 CAPSULE | Refills: 0 | Status: SHIPPED | OUTPATIENT
Start: 2021-03-30 | End: 2021-11-15 | Stop reason: SDUPTHER

## 2021-03-30 RX ORDER — PANTOPRAZOLE SODIUM 20 MG/1
TABLET, DELAYED RELEASE ORAL
Qty: 90 TABLET | Refills: 0 | Status: SHIPPED | OUTPATIENT
Start: 2021-03-30 | End: 2021-04-19

## 2021-03-30 RX ORDER — PREDNISONE 20 MG/1
40 TABLET ORAL DAILY
Qty: 10 TABLET | Refills: 0 | Status: SHIPPED | OUTPATIENT
Start: 2021-03-30 | End: 2021-05-11

## 2021-04-19 ENCOUNTER — TELEPHONE (OUTPATIENT)
Dept: GASTROENTEROLOGY | Facility: CLINIC | Age: 66
End: 2021-04-19

## 2021-04-19 RX ORDER — PANTOPRAZOLE SODIUM 40 MG/1
40 TABLET, DELAYED RELEASE ORAL
Qty: 30 TABLET | Refills: 0 | Status: SHIPPED | OUTPATIENT
Start: 2021-04-19 | End: 2021-05-11 | Stop reason: SDUPTHER

## 2021-05-11 ENCOUNTER — OFFICE VISIT (OUTPATIENT)
Dept: GASTROENTEROLOGY | Facility: CLINIC | Age: 66
End: 2021-05-11

## 2021-05-11 VITALS
HEIGHT: 65 IN | TEMPERATURE: 97.7 F | WEIGHT: 175.4 LBS | HEART RATE: 78 BPM | RESPIRATION RATE: 20 BRPM | DIASTOLIC BLOOD PRESSURE: 70 MMHG | BODY MASS INDEX: 29.22 KG/M2 | OXYGEN SATURATION: 98 % | SYSTOLIC BLOOD PRESSURE: 132 MMHG

## 2021-05-11 DIAGNOSIS — K21.9 GASTROESOPHAGEAL REFLUX DISEASE, UNSPECIFIED WHETHER ESOPHAGITIS PRESENT: Primary | ICD-10-CM

## 2021-05-11 DIAGNOSIS — Z12.11 COLON CANCER SCREENING: ICD-10-CM

## 2021-05-11 PROCEDURE — 99213 OFFICE O/P EST LOW 20 MIN: CPT | Performed by: PHYSICIAN ASSISTANT

## 2021-05-11 RX ORDER — PANTOPRAZOLE SODIUM 40 MG/1
40 TABLET, DELAYED RELEASE ORAL
Qty: 30 TABLET | Refills: 5 | Status: SHIPPED | OUTPATIENT
Start: 2021-05-11 | End: 2021-11-08 | Stop reason: SDUPTHER

## 2021-05-11 NOTE — PROGRESS NOTES
Follow Up Note     Date: 2021   Patient Name: Itzel Hagen  MRN: [id number]  : 1955     Primary Care Provider: Maude Snow APRN     Chief Complaint:    Chief Complaint   Patient presents with   • Follow-up   • Heartburn     History of present illness:   2021  Itzel Hagen is a 65 y.o. female who is here today for follow up regarding GERD.    Today, she reports that dysphagia is intermittent and mild.  She would like a refill on her Protonix 40 mg once daily that she takes for GERD.  In the past, she is taken Protonix 20 mg without relief and tried discontinuing this medication altogether and had very severe acid reflux symptoms and could not go without it.  She is in a hurry today due to caring for her elderly mother and father.  After her last GI visit in , she was supposed to have colonoscopy for colon cancer screening completed but she did not.  She does not want to have this arranged at this time.  Denies any current rectal bleeding, change in bowel habits or abdominal pain.    Interval History:  2020  There is a long standing history of heartburn. Heartburn is severe. Reflux is worse at night. The patient is taking Pantoprazole once a day and and Pepcid twice a day with moderate control of heartburn.      The patient denies recent change in bowel habits. There is no diarrhea or constipation. There is no history of abdominal pain. There is no history of overt GI bleed (hematemesis melena or hematochezia). The patient denies nausea or vomiting. The patient denies dysphagia or odynophagia. There is no history of recent significant weight loss. There is no history of liver disease in the past. There is no family history of colon cancer. The patient has not had a colonoscopy in the past.     She complains of coughing and heartburn. She reports no abdominal pain, no chest pain or no nausea. This is a chronic problem. Episode onset: over 5 years ago. The problem  occurs occasionally. The problem has been gradually improving. The heartburn duration is an hour. The heartburn is located in the substernum. The heartburn is of severe intensity. The heartburn wakes her from sleep. Nothing aggravates the symptoms. Pertinent negatives include no fatigue. There are no known risk factors. She has tried a histamine-2 antagonist and a PPI for the symptoms. The treatment provided moderate relief. Past procedures include an abdominal ultrasound () and an EGD (10/15/2019).     Subjective     Past Medical History:   Diagnosis Date   • Acute pancreatitis    • Angina, class III (CMS/HCC)    • Back pain    • Bilateral cataracts    • Chest pain    • Chronic bronchitis (CMS/HCC)    • Cigarette nicotine dependence    • COPD (chronic obstructive pulmonary disease) (CMS/HCC)    • Current smoker    • Full dentures    • GERD (gastroesophageal reflux disease)    • H/O cardiac catheterization 2019    Dr. Izquierdo, Conclusion: 1.Overall mild coronary artery disease with right dominant system.   • Heart palpitations    • History of nuclear stress test 2019    ·Left ventricular ejection fraction is hyperdynamic (Calculated EF > 70%). Impressions are consistent with a low risk study.   • Hyperlipidemia    • PONV (postoperative nausea and vomiting)    • Precordial chest pain    • SOB (shortness of breath)      Past Surgical History:   Procedure Laterality Date   • APPENDECTOMY     • BACK SURGERY      Removed bone spurs/cleaned up the area   • CARDIAC CATHETERIZATION N/A 2019    Procedure: Left Heart Cath;  Surgeon: Dre Izquierdo MD;  Location: [facility] CATH INVASIVE LOC[facility];  Service: Cardiology   •  SECTION  ,    • CHOLECYSTECTOMY      + stones   • ENDOSCOPY     • ENDOSCOPY N/A 10/15/2019    Procedure: ESOPHAGOGASTRODUODENOSCOPY WITH BIOPSY AND ESOPHAGEAL DILATATION;  Surgeon: Ambrocio Solorzano MD;  Location: [facility] ENDOSCOPY;  Service: Gastroenterology   •  "HYSTERECTOMY     • SHOULDER ACROMIOPLASTY  1998   • UPPER GASTROINTESTINAL ENDOSCOPY  10/15/2019     Family History   Problem Relation Age of Onset   • Heart disease Mother    • Heart disease Father    • Heart disease Brother    • Cirrhosis Neg Hx    • Liver cancer Neg Hx    • Liver disease Neg Hx    • Colon cancer Neg Hx    • Crohn's disease Neg Hx    • Ulcerative colitis Neg Hx    • Esophageal cancer Neg Hx    • Stomach cancer Neg Hx      Social History     Socioeconomic History   • Marital status:      Spouse name: Not on file   • Number of children: Not on file   • Years of education: Not on file   • Highest education level: Not on file   Tobacco Use   • Smoking status: Current Every Day Smoker     Packs/day: 1.50     Types: Cigarettes     Start date: 1971   • Smokeless tobacco: Never Used   Vaping Use   • Vaping Use: Never used   Substance and Sexual Activity   • Alcohol use: No     Comment: \"not in 30 years\"   • Drug use: No   • Sexual activity: Defer       Current Outpatient Medications:   •  ALBUTEROL SULFATE IN, Inhale. As needed every 4-6 hours, Disp: , Rfl:   •  aspirin 81 MG EC tablet, Take 1 tablet by mouth Daily., Disp: 90 tablet, Rfl: 2  •  doxycycline (VIBRAMYCIN) 100 MG capsule, Take 1 capsule by mouth 2 (Two) Times a Day., Disp: 20 capsule, Rfl: 0  •  ipratropium-albuterol (DUO-NEB) 0.5-2.5 mg/3 ml nebulizer, Take 3 mL by nebulization 4 (Four) Times a Day As Needed for Wheezing., Disp: 120 mL, Rfl: 5  •  isosorbide mononitrate (IMDUR) 120 MG 24 hr tablet, TAKE 1 TABLET EVERY MORNING, Disp: 90 tablet, Rfl: 3  •  Multiple Vitamins-Minerals (ALIVE WOMENS ENERGY PO), Take 1 tablet by mouth Daily., Disp: , Rfl:   •  Naproxen Sodium (ALEVE PO), Take  by mouth Daily., Disp: , Rfl:   •  nitroglycerin (NITROSTAT) 0.4 MG SL tablet, Place 0.4 mg under the tongue Every 5 (Five) Minutes As Needed for Chest Pain. Take no more than 3 doses in 15 minutes., Disp: , Rfl:   •  pantoprazole (PROTONIX) 40 MG EC " tablet, Take 1 tablet by mouth Every Morning Before Breakfast. Take 1 tablet 30 minutes before breakfast daily., Disp: 30 tablet, Rfl: 5  •  simvastatin (ZOCOR) 20 MG tablet, TAKE 1 TABLET EVERY NIGHT, Disp: 90 tablet, Rfl: 3  •  umeclidinium-vilanterol (ANORO ELLIPTA) 62.5-25 MCG/INH aerosol powder  inhaler, Inhale 1 puff Daily., Disp: 60 each, Rfl: 6  •  Vitamins C E (VITAMIN C & E COMBINATION PO), Take  by mouth Daily., Disp: , Rfl:     Allergies   Allergen Reactions   • Penicillins Anaphylaxis   • Coconut Swelling and Rash     Tongue swells   • Morphine Swelling       The following portions of the patient's history were reviewed and updated as appropriate: allergies, current medications, past family history, past medical history, past social history, past surgical history and problem list.    Objective     Physical Exam  Vitals reviewed.   Constitutional:       General: She is not in acute distress.     Appearance: Normal appearance. She is well-developed. She is not ill-appearing or diaphoretic.   HENT:      Head: Normocephalic and atraumatic.      Right Ear: External ear normal.      Left Ear: External ear normal.      Nose: Nose normal.      Mouth/Throat:      Comments: Wearing a mask  Eyes:      General: No scleral icterus.        Right eye: No discharge.         Left eye: No discharge.      Conjunctiva/sclera: Conjunctivae normal.   Neck:      Vascular: No JVD.   Cardiovascular:      Rate and Rhythm: Normal rate and regular rhythm.      Heart sounds: Normal heart sounds. No murmur heard.   No friction rub. No gallop.    Pulmonary:      Effort: Pulmonary effort is normal. No respiratory distress.      Breath sounds: Normal breath sounds. No wheezing or rales.   Chest:      Chest wall: No tenderness.   Abdominal:      General: Bowel sounds are normal. There is no distension.      Palpations: Abdomen is soft. There is no mass.      Tenderness: There is no abdominal tenderness. There is no guarding.  "  Musculoskeletal:         General: No deformity. Normal range of motion.      Cervical back: Normal range of motion.   Skin:     General: Skin is warm and dry.      Findings: No erythema or rash.   Neurological:      Mental Status: She is alert and oriented to person, place, and time.      Coordination: Coordination normal.   Psychiatric:         Mood and Affect: Mood normal.         Behavior: Behavior normal.         Thought Content: Thought content normal.         Judgment: Judgment normal.       Vitals:    05/11/21 1417   BP: 132/70   Pulse: 78   Resp: 20   Temp: 97.7 °F (36.5 °C)   SpO2: 98%   Weight: 79.6 kg (175 lb 6.4 oz)   Height: 165.1 cm (65\")       Results Review:   No recent results to review.    EGD dated 10/15/2019 reveals erosive distal esophagitis.  LA class C.  Small sliding hiatal hernia less than 3 cm.  Schatzki's ring.  Subtle concentric rings involving the mid and distal esophagus.  Status post serial dilation to 18 mm.  Erythematous gastritis.  Duodenum second portion biopsy reveals benign duodenal mucosa with no diagnostic abnormality.  Antrum body and fundus biopsy reveals reactive gastropathy.  Mid and distal esophagus biopsies revealed reactive squamous mucosa.    Assessment / Plan      1. Gastroesophageal reflux disease, unspecified whether esophagitis present  She has had GERD for several years now.  Her last EGD was in 2019 and showed esophagitis, no Castillo's was noted.  She would like refills of her Protonix 40 mg once daily that she takes with great relief of acid reflux symptoms.  She is try discontinuing this medication in the past and was unable to stop it due to severe acid reflux symptoms.  She was instructed not to lie down immediately after eating (wait at least 3 hours after meals), elevate the head of the bed at night, avoid spicy foods, avoid mints, avoid caffeine, avoid nicotine and work on getting to a healthy weight.  - pantoprazole (PROTONIX) 40 MG EC tablet; Take 1 " tablet by mouth Every Morning Before Breakfast. Take 1 tablet 30 minutes before breakfast daily.  Dispense: 30 tablet; Refill: 5    2. Colon cancer screening   She has never had a colonoscopy.  There is no known family history of colon cancer or colon polyps.  She declines colorectal cancer screening colonoscopy at this time.  She does agree to have Cologuard testing.  This will be sent for her.        Follow Up:   Return in about 6 months (around 11/11/2021) for recheck GERD.      Shea Ramirez PA-C  [facility]  5/11/2021  17:46 EDT    Please note that portions of this note may have been completed with a voice recognition program. Efforts were made to edit the dictations, but occasionally words are mistranscribed.

## 2021-06-16 DIAGNOSIS — K29.50 CHRONIC GASTRITIS WITHOUT BLEEDING, UNSPECIFIED GASTRITIS TYPE: Chronic | ICD-10-CM

## 2021-06-16 DIAGNOSIS — R12 HEARTBURN: Chronic | ICD-10-CM

## 2021-06-16 DIAGNOSIS — K22.10 ESOPHAGITIS, EROSIVE: ICD-10-CM

## 2021-06-17 RX ORDER — PANTOPRAZOLE SODIUM 20 MG/1
TABLET, DELAYED RELEASE ORAL
Qty: 90 TABLET | Refills: 3 | OUTPATIENT
Start: 2021-06-17

## 2021-08-03 ENCOUNTER — OFFICE VISIT (OUTPATIENT)
Dept: CARDIOLOGY | Facility: CLINIC | Age: 66
End: 2021-08-03

## 2021-08-03 VITALS
DIASTOLIC BLOOD PRESSURE: 63 MMHG | SYSTOLIC BLOOD PRESSURE: 137 MMHG | TEMPERATURE: 98 F | HEIGHT: 65 IN | BODY MASS INDEX: 28.92 KG/M2 | WEIGHT: 173.6 LBS | HEART RATE: 55 BPM

## 2021-08-03 DIAGNOSIS — I10 ESSENTIAL HYPERTENSION: ICD-10-CM

## 2021-08-03 DIAGNOSIS — R07.2 PRECORDIAL PAIN: Primary | Chronic | ICD-10-CM

## 2021-08-03 DIAGNOSIS — R42 DIZZY: ICD-10-CM

## 2021-08-03 PROBLEM — I25.110 UNSTABLE ANGINA PECTORIS DUE TO CORONARY ARTERIOSCLEROSIS: Status: RESOLVED | Noted: 2020-06-01 | Resolved: 2021-08-03

## 2021-08-03 PROCEDURE — 99214 OFFICE O/P EST MOD 30 MIN: CPT | Performed by: PHYSICIAN ASSISTANT

## 2021-08-03 PROCEDURE — 93000 ELECTROCARDIOGRAM COMPLETE: CPT | Performed by: PHYSICIAN ASSISTANT

## 2021-08-03 RX ORDER — METOPROLOL SUCCINATE 25 MG/1
25 TABLET, EXTENDED RELEASE ORAL DAILY
Qty: 90 TABLET | Refills: 3 | Status: SHIPPED | OUTPATIENT
Start: 2021-08-03 | End: 2021-08-03 | Stop reason: SDUPTHER

## 2021-08-03 RX ORDER — METOPROLOL SUCCINATE 25 MG/1
25 TABLET, EXTENDED RELEASE ORAL DAILY
Qty: 30 TABLET | Refills: 3 | Status: SHIPPED | OUTPATIENT
Start: 2021-08-03 | End: 2022-02-24

## 2021-08-03 NOTE — PROGRESS NOTES
Maude Snow APRN  Itzel Hagen  1955 08/03/2021    Patient Active Problem List   Diagnosis   • Precordial pain   • Angina, class III (CMS/HCC)   • Cigarette nicotine dependence without complication   • Family history of premature coronary artery disease   • Heartburn   • Acute pancreatitis without infection or necrosis   • Abnormal CT of the abdomen   • Chest pain   • Centrilobular emphysema (CMS/HCC)   • Chronic fatigue syndrome   • Gastro-esophageal reflux disease without esophagitis   • Chronic pancreatitis (CMS/HCC)   • Esophagitis, erosive   • Gastritis, erosive   • Essential hypertension       Dear Maude Snow APRN:    Subjective     History of Present Illness:    Chief Complaint   Patient presents with   • Follow-up     ROUTINE    • Med Management     PT PROVIDED LIST        Itzel Hagen is a pleasant 65 y.o. female with a past medical history significant for nonobstructive CAD with 20% stenosis in the RCA. She does smoke tobacco, and does have premature family history with her mother having a CABG but does have a brother who has received stents and is 40 years of age. She does not have diabetes. She is here for routine cardiology follow-up.    Now reports that she has been having left neck pain that radiates down into her left arm.  She describes this as a dull ache that has been present for the last month averaging roughly once weekly.  She reports that this pain can last anywhere from an hour to an hour and a half or even longer at times.  She reports that this pain has been coming on at random however she has been able to reproduce this pain on physical exertion.  She reports that she was raking her lawn yesterday when the pain came on.        Allergies   Allergen Reactions   • Penicillins Anaphylaxis   • Coconut Swelling and Rash     Tongue swells   • Morphine Swelling   :      Current Outpatient Medications:   •  ALBUTEROL SULFATE IN, Inhale. As needed every 4-6  "hours, Disp: , Rfl:   •  aspirin 81 MG EC tablet, Take 1 tablet by mouth Daily., Disp: 90 tablet, Rfl: 2  •  ipratropium-albuterol (DUO-NEB) 0.5-2.5 mg/3 ml nebulizer, Take 3 mL by nebulization 4 (Four) Times a Day As Needed for Wheezing., Disp: 120 mL, Rfl: 5  •  isosorbide mononitrate (IMDUR) 120 MG 24 hr tablet, TAKE 1 TABLET EVERY MORNING, Disp: 90 tablet, Rfl: 3  •  Multiple Vitamins-Minerals (ALIVE WOMENS ENERGY PO), Take 1 tablet by mouth Daily., Disp: , Rfl:   •  Naproxen Sodium (ALEVE PO), Take  by mouth Daily., Disp: , Rfl:   •  nitroglycerin (NITROSTAT) 0.4 MG SL tablet, Place 0.4 mg under the tongue Every 5 (Five) Minutes As Needed for Chest Pain. Take no more than 3 doses in 15 minutes., Disp: , Rfl:   •  pantoprazole (PROTONIX) 40 MG EC tablet, Take 1 tablet by mouth Every Morning Before Breakfast. Take 1 tablet 30 minutes before breakfast daily., Disp: 30 tablet, Rfl: 5  •  simvastatin (ZOCOR) 20 MG tablet, TAKE 1 TABLET EVERY NIGHT, Disp: 90 tablet, Rfl: 3  •  umeclidinium-vilanterol (ANORO ELLIPTA) 62.5-25 MCG/INH aerosol powder  inhaler, Inhale 1 puff Daily., Disp: 60 each, Rfl: 6  •  Vitamins C E (VITAMIN C & E COMBINATION PO), Take  by mouth Daily., Disp: , Rfl:   •  doxycycline (VIBRAMYCIN) 100 MG capsule, Take 1 capsule by mouth 2 (Two) Times a Day., Disp: 20 capsule, Rfl: 0  •  metoprolol succinate XL (TOPROL-XL) 25 MG 24 hr tablet, Take 1 tablet by mouth Daily., Disp: 30 tablet, Rfl: 3    The following portions of the patient's history were reviewed and updated as appropriate: allergies, current medications, past family history, past medical history, past social history, past surgical history and problem list.    Social History     Tobacco Use   • Smoking status: Current Every Day Smoker     Packs/day: 1.50     Types: Cigarettes     Start date: 1971   • Smokeless tobacco: Never Used   Vaping Use   • Vaping Use: Never used   Substance Use Topics   • Alcohol use: No     Comment: \"not in 30 " "years\"   • Drug use: No         Objective   Vitals:    08/03/21 1251 08/03/21 1345 08/03/21 1346   BP: 137/73 141/69 137/63   BP Location:  Left arm Right arm   Patient Position:  Sitting Sitting   Cuff Size:  Large Adult Large Adult   Pulse: 65 57 55   Temp: 98 °F (36.7 °C)     Weight: 78.7 kg (173 lb 9.6 oz)     Height: 165.1 cm (65\")       Body mass index is 28.89 kg/m².    Constitutional:       General: Not in acute distress.     Appearance: Healthy appearance. Well-developed and not in distress. Not diaphoretic.   Eyes:      Conjunctiva/sclera: Conjunctivae normal.      Pupils: Pupils are equal, round, and reactive to light.   HENT:      Head: Normocephalic and atraumatic.   Neck:      Vascular: No carotid bruit or JVD.   Pulmonary:      Effort: Pulmonary effort is normal. No respiratory distress.      Breath sounds: Normal breath sounds.   Cardiovascular:      Normal rate. Regular rhythm.   Pulses:     Radial: 2+ on the left side and 1+ on the right side.  Skin:     General: Skin is cool.   Neurological:      Mental Status: Alert, oriented to person, place, and time and oriented to person, place and time.         Lab Results   Component Value Date     09/06/2019    K 4.1 09/06/2019     09/06/2019    CO2 29.3 (H) 09/06/2019    BUN 11 09/06/2019    CREATININE 0.84 09/06/2019    GLUCOSE 88 09/06/2019    CALCIUM 9.3 09/06/2019    AST 13 09/06/2019    ALT 9 09/06/2019    ALKPHOS 92 09/06/2019     No results found for: CKTOTAL  Lab Results   Component Value Date    WBC 9.77 09/06/2019    HGB 14.0 09/06/2019    HCT 42.2 09/06/2019     09/06/2019     No results found for: INR  No results found for: MG  No results found for: TSH, PSA, CHLPL, TRIG, HDL, LDL   No results found for: BNP    During this visit the following were done:  Labs Reviewed []    Labs Ordered []    Radiology Reports Reviewed []    Radiology Ordered []    PCP Records Reviewed []    Referring Provider Records Reviewed []    ER " Records Reviewed []    Hospital Records Reviewed []    History Obtained From Family []    Radiology Images Reviewed []    Other Reviewed []    Records Requested []         ECG 12 Lead    Date/Time: 8/3/2021 1:01 PM  Performed by: Sebastian Fuchs PA-C  Authorized by: Sebastian Fuchs PA-C   Comparison: compared with previous ECG   Similar to previous ECG  Rhythm: sinus rhythm  Conduction: conduction normal  ST Segments: ST segments normal    Clinical impression: normal ECG            Assessment/Plan    Diagnosis Plan   1. Precordial pain  Stress Test With Myocardial Perfusion   2. Essential hypertension     3. Dizzy  US Carotid Bilateral            Recommendations:  1. Precordial pain  1. We will evaluate further with stress test.  2. We will also optimize medical therapy further by starting metoprolol succinate 25 mg daily.  3. Continue aspirin, Imdur, and simvastatin.  2. Dizziness  1. Patient does have weak pulses in the left radial pulse with dizziness so I will be ordering carotid ultrasound to rule out subclavian steal syndrome.      No follow-ups on file.    As always, I appreciate very much the opportunity to participate in the cardiovascular care of your patients.      With Best Regards,    Sebastian Fuchs PA-C

## 2021-08-30 ENCOUNTER — APPOINTMENT (OUTPATIENT)
Dept: NUCLEAR MEDICINE | Facility: HOSPITAL | Age: 66
End: 2021-08-30

## 2021-08-30 ENCOUNTER — APPOINTMENT (OUTPATIENT)
Dept: CARDIOLOGY | Facility: HOSPITAL | Age: 66
End: 2021-08-30

## 2021-09-13 RX ORDER — SIMVASTATIN 20 MG
TABLET ORAL
Qty: 90 TABLET | Refills: 3 | Status: SHIPPED | OUTPATIENT
Start: 2021-09-13 | End: 2021-10-19

## 2021-09-13 RX ORDER — ISOSORBIDE MONONITRATE 120 MG/1
TABLET, EXTENDED RELEASE ORAL
Qty: 90 TABLET | Refills: 3 | Status: SHIPPED | OUTPATIENT
Start: 2021-09-13 | End: 2022-04-14 | Stop reason: SDUPTHER

## 2021-09-16 ENCOUNTER — APPOINTMENT (OUTPATIENT)
Dept: CARDIOLOGY | Facility: HOSPITAL | Age: 66
End: 2021-09-16

## 2021-09-16 ENCOUNTER — APPOINTMENT (OUTPATIENT)
Dept: NUCLEAR MEDICINE | Facility: HOSPITAL | Age: 66
End: 2021-09-16

## 2021-10-04 ENCOUNTER — APPOINTMENT (OUTPATIENT)
Dept: NUCLEAR MEDICINE | Facility: HOSPITAL | Age: 66
End: 2021-10-04

## 2021-10-04 ENCOUNTER — APPOINTMENT (OUTPATIENT)
Dept: CARDIOLOGY | Facility: HOSPITAL | Age: 66
End: 2021-10-04

## 2021-10-12 ENCOUNTER — HOSPITAL ENCOUNTER (OUTPATIENT)
Dept: CARDIOLOGY | Facility: HOSPITAL | Age: 66
Discharge: HOME | End: 2021-10-12

## 2021-10-12 ENCOUNTER — HOSPITAL ENCOUNTER (OUTPATIENT)
Dept: NUCLEAR MEDICINE | Facility: HOSPITAL | Age: 66
Discharge: HOME | End: 2021-10-12

## 2021-10-12 DIAGNOSIS — R42 DIZZY: ICD-10-CM

## 2021-10-12 DIAGNOSIS — R07.2 PRECORDIAL PAIN: ICD-10-CM

## 2021-10-12 PROCEDURE — 93880 EXTRACRANIAL BILAT STUDY: CPT

## 2021-10-12 PROCEDURE — 78452 HT MUSCLE IMAGE SPECT MULT: CPT

## 2021-10-12 PROCEDURE — A9500 TC99M SESTAMIBI: HCPCS | Performed by: PHYSICIAN ASSISTANT

## 2021-10-12 PROCEDURE — 93880 EXTRACRANIAL BILAT STUDY: CPT | Performed by: RADIOLOGY

## 2021-10-12 PROCEDURE — 93018 CV STRESS TEST I&R ONLY: CPT | Performed by: INTERNAL MEDICINE

## 2021-10-12 PROCEDURE — 78452 HT MUSCLE IMAGE SPECT MULT: CPT | Performed by: INTERNAL MEDICINE

## 2021-10-12 PROCEDURE — 0 TECHNETIUM SESTAMIBI: Performed by: PHYSICIAN ASSISTANT

## 2021-10-12 PROCEDURE — 25010000002 REGADENOSON 0.4 MG/5ML SOLUTION: Performed by: PHYSICIAN ASSISTANT

## 2021-10-12 PROCEDURE — 93017 CV STRESS TEST TRACING ONLY: CPT

## 2021-10-12 RX ADMIN — REGADENOSON 0.4 MG: 0.08 INJECTION, SOLUTION INTRAVENOUS at 12:35

## 2021-10-12 RX ADMIN — TECHNETIUM TC 99M SESTAMIBI 1 DOSE: 1 INJECTION INTRAVENOUS at 11:00

## 2021-10-12 RX ADMIN — TECHNETIUM TC 99M SESTAMIBI 1 DOSE: 1 INJECTION INTRAVENOUS at 12:35

## 2021-10-14 ENCOUNTER — OFFICE VISIT (OUTPATIENT)
Dept: CARDIOLOGY | Facility: CLINIC | Age: 66
End: 2021-10-14

## 2021-10-14 VITALS
HEIGHT: 65 IN | HEART RATE: 62 BPM | DIASTOLIC BLOOD PRESSURE: 77 MMHG | TEMPERATURE: 97.1 F | WEIGHT: 173 LBS | BODY MASS INDEX: 28.82 KG/M2 | SYSTOLIC BLOOD PRESSURE: 152 MMHG

## 2021-10-14 DIAGNOSIS — I10 ESSENTIAL HYPERTENSION: Primary | ICD-10-CM

## 2021-10-14 LAB
BH CV REST NUCLEAR ISOTOPE DOSE: 9.3 MCI
BH CV STRESS BP STAGE 1: NORMAL
BH CV STRESS BP STAGE 2: NORMAL
BH CV STRESS COMMENTS STAGE 1: NORMAL
BH CV STRESS COMMENTS STAGE 2: NORMAL
BH CV STRESS DOSE REGADENOSON STAGE 1: 0.4
BH CV STRESS DURATION MIN STAGE 1: 0
BH CV STRESS DURATION MIN STAGE 2: 4
BH CV STRESS DURATION SEC STAGE 1: 10
BH CV STRESS DURATION SEC STAGE 2: 0
BH CV STRESS HR STAGE 1: 90
BH CV STRESS HR STAGE 2: 62
BH CV STRESS NUCLEAR ISOTOPE DOSE: 28.7 MCI
BH CV STRESS PROTOCOL 1: NORMAL
BH CV STRESS RECOVERY BP: NORMAL MMHG
BH CV STRESS RECOVERY HR: 62 BPM
BH CV STRESS STAGE 1: 1
BH CV STRESS STAGE 2: 2
MAXIMAL PREDICTED HEART RATE: 156 BPM
PERCENT MAX PREDICTED HR: 57.69 %
STRESS BASELINE BP: NORMAL MMHG
STRESS BASELINE HR: 56 BPM
STRESS PERCENT HR: 68 %
STRESS POST PEAK BP: NORMAL MMHG
STRESS POST PEAK HR: 90 BPM
STRESS TARGET HR: 133 BPM

## 2021-10-14 PROCEDURE — 99214 OFFICE O/P EST MOD 30 MIN: CPT | Performed by: PHYSICIAN ASSISTANT

## 2021-10-14 NOTE — PROGRESS NOTES
Provider, No Known  Itzel Hagen  1955  10/14/2021    Patient Active Problem List   Diagnosis   • Precordial pain   • Angina, class III (HCC)   • Cigarette nicotine dependence without complication   • Family history of premature coronary artery disease   • Heartburn   • Acute pancreatitis without infection or necrosis   • Abnormal CT of the abdomen   • Chest pain   • Centrilobular emphysema (HCC)   • Chronic fatigue syndrome   • Gastro-esophageal reflux disease without esophagitis   • Chronic pancreatitis (HCC)   • Esophagitis, erosive   • Gastritis, erosive   • Essential hypertension       Dear Provider, No Known:    Subjective     History of Present Illness:    Chief Complaint   Patient presents with   • Follow-up     stress test       Itzel Hagen is a pleasant 65 y.o. female with a past medical history significant for nonobstructive CAD with 20% stenosis in the RCA. She does smoke tobacco, and does have premature family history with her mother having a CABG but does have a brother who has received stents and is 40 years of age. She does not have diabetes. She is here for routine cardiology follow-up.     Ms. Thrasher reports that she does still have left neck pain that does radiate into her left arm.  She reports that this comes on anywhere between 2-3 times weekly and can last for 2 to 3 minutes in duration on average.  She reports that this pain feels like a knot in the side of her neck that will hurt.  She reports that it comes on at random and is unable to reliably reproduce this pain on physical exertion.  She also reports that the pain resolved spontaneously.  Did have stress test which showed normal myocardial perfusion with no evidence of ischemia.  She also had carotid ultrasound which did reveal mild to moderate plaque buildup but no flow-limiting occlusion/stenosis.      Allergies   Allergen Reactions   • Penicillins Anaphylaxis   • Coconut Swelling and Rash     Tongue swells   • Morphine  Swelling   :      Current Outpatient Medications:   •  ALBUTEROL SULFATE IN, Inhale. As needed every 4-6 hours, Disp: , Rfl:   •  aspirin 81 MG EC tablet, Take 1 tablet by mouth Daily., Disp: 90 tablet, Rfl: 2  •  doxycycline (VIBRAMYCIN) 100 MG capsule, Take 1 capsule by mouth 2 (Two) Times a Day., Disp: 20 capsule, Rfl: 0  •  ipratropium-albuterol (DUO-NEB) 0.5-2.5 mg/3 ml nebulizer, Take 3 mL by nebulization 4 (Four) Times a Day As Needed for Wheezing., Disp: 120 mL, Rfl: 5  •  isosorbide mononitrate (IMDUR) 120 MG 24 hr tablet, TAKE 1 TABLET EVERY MORNING, Disp: 90 tablet, Rfl: 3  •  metoprolol succinate XL (TOPROL-XL) 25 MG 24 hr tablet, Take 1 tablet by mouth Daily., Disp: 30 tablet, Rfl: 3  •  Multiple Vitamins-Minerals (ALIVE WOMENS ENERGY PO), Take 1 tablet by mouth Daily., Disp: , Rfl:   •  Naproxen Sodium (ALEVE PO), Take  by mouth Daily., Disp: , Rfl:   •  nitroglycerin (NITROSTAT) 0.4 MG SL tablet, Place 0.4 mg under the tongue Every 5 (Five) Minutes As Needed for Chest Pain. Take no more than 3 doses in 15 minutes., Disp: , Rfl:   •  pantoprazole (PROTONIX) 40 MG EC tablet, Take 1 tablet by mouth Every Morning Before Breakfast. Take 1 tablet 30 minutes before breakfast daily., Disp: 30 tablet, Rfl: 5  •  simvastatin (ZOCOR) 20 MG tablet, TAKE 1 TABLET EVERY NIGHT, Disp: 90 tablet, Rfl: 3  •  umeclidinium-vilanterol (ANORO ELLIPTA) 62.5-25 MCG/INH aerosol powder  inhaler, Inhale 1 puff Daily., Disp: 60 each, Rfl: 6  •  Vitamins C E (VITAMIN C & E COMBINATION PO), Take  by mouth Daily., Disp: , Rfl:     The following portions of the patient's history were reviewed and updated as appropriate: allergies, current medications, past family history, past medical history, past social history, past surgical history and problem list.    Social History     Tobacco Use   • Smoking status: Current Every Day Smoker     Packs/day: 1.50     Types: Cigarettes     Start date: 1971   • Smokeless tobacco: Never Used  "  Vaping Use   • Vaping Use: Never used   Substance Use Topics   • Alcohol use: No     Comment: \"not in 30 years\"   • Drug use: No         Objective   Vitals:    10/14/21 1515   BP: 152/77   Pulse: 62   Temp: 97.1 °F (36.2 °C)   Weight: 78.5 kg (173 lb)   Height: 165.1 cm (65\")     Body mass index is 28.79 kg/m².    Constitutional:       General: Not in acute distress.     Appearance: Healthy appearance. Well-developed and not in distress. Not diaphoretic.   Eyes:      Conjunctiva/sclera: Conjunctivae normal.      Pupils: Pupils are equal, round, and reactive to light.   HENT:      Head: Normocephalic and atraumatic.   Neck:      Vascular: No carotid bruit or JVD.   Pulmonary:      Effort: Pulmonary effort is normal. No respiratory distress.      Breath sounds: Normal breath sounds.   Cardiovascular:      Normal rate. Regular rhythm.   Skin:     General: Skin is cool.   Neurological:      Mental Status: Alert, oriented to person, place, and time and oriented to person, place and time.         Lab Results   Component Value Date     09/06/2019    K 4.1 09/06/2019     09/06/2019    CO2 29.3 (H) 09/06/2019    BUN 11 09/06/2019    CREATININE 0.84 09/06/2019    GLUCOSE 88 09/06/2019    CALCIUM 9.3 09/06/2019    AST 13 09/06/2019    ALT 9 09/06/2019    ALKPHOS 92 09/06/2019     No results found for: CKTOTAL  Lab Results   Component Value Date    WBC 9.77 09/06/2019    HGB 14.0 09/06/2019    HCT 42.2 09/06/2019     09/06/2019     No results found for: INR  No results found for: MG  No results found for: TSH, PSA, CHLPL, TRIG, HDL, LDL   No results found for: BNP    During this visit the following were done:  Labs Reviewed []    Labs Ordered []    Radiology Reports Reviewed []    Radiology Ordered []    PCP Records Reviewed []    Referring Provider Records Reviewed []    ER Records Reviewed []    Hospital Records Reviewed []    History Obtained From Family []    Radiology Images Reviewed []    Other " Reviewed []    Records Requested []       Procedures    Assessment/Plan    Diagnosis Plan   1. Essential hypertension  Comprehensive Metabolic Panel    Lipid Panel            Recommendations:  1. Precordial pain  1. Discussed results of patient's stress test which was unremarkable showing no signs of ischemia.  Her symptoms are atypical in presentation and given them normal stress test as well as left heart catheterization 2019 I do feel chances of significant coronary artery disease is low and did encourage her to pursue noncardiac work-up at this time.  I will continue medical therapy of simvastatin, aspirin, and metoprolol.  2. Carotid artery disease  1. Discussed results of carotid artery ultrasound which did reveal mild to moderate plaquing but no significant stenosis.  We will continue medical therapy with aspirin and statin.  2. We will once again order CMP and lipid panel for routine monitoring.      Return in about 6 months (around 4/14/2022).    As always, I appreciate very much the opportunity to participate in the cardiovascular care of your patients.      With Best Regards,    Sebastian Fuchs PA-C

## 2021-10-19 DIAGNOSIS — I10 ESSENTIAL HYPERTENSION: ICD-10-CM

## 2021-10-19 DIAGNOSIS — R42 DIZZY: Primary | ICD-10-CM

## 2021-10-19 RX ORDER — ATORVASTATIN CALCIUM 80 MG/1
80 TABLET, FILM COATED ORAL DAILY
Qty: 90 TABLET | Refills: 3 | Status: SHIPPED | OUTPATIENT
Start: 2021-10-19 | End: 2021-11-18

## 2021-11-08 ENCOUNTER — TELEPHONE (OUTPATIENT)
Dept: GASTROENTEROLOGY | Facility: CLINIC | Age: 66
End: 2021-11-08

## 2021-11-08 DIAGNOSIS — K21.9 GASTROESOPHAGEAL REFLUX DISEASE, UNSPECIFIED WHETHER ESOPHAGITIS PRESENT: ICD-10-CM

## 2021-11-08 RX ORDER — PANTOPRAZOLE SODIUM 40 MG/1
40 TABLET, DELAYED RELEASE ORAL
Qty: 30 TABLET | Refills: 3 | Status: SHIPPED | OUTPATIENT
Start: 2021-11-08 | End: 2021-11-24 | Stop reason: ALTCHOICE

## 2021-11-08 NOTE — TELEPHONE ENCOUNTER
----- Message from Dot Lam MA sent at 11/8/2021  2:21 PM EST -----  Is needing refill on the 20 mg of protonix to the [facility] in [place], had to cancel appt for tomorrow, her  is in the hospital in [place] and not doing well

## 2021-11-13 ENCOUNTER — HOSPITAL ENCOUNTER (EMERGENCY)
Facility: HOSPITAL | Age: 66
Discharge: HOME | End: 2021-11-14
Attending: STUDENT IN AN ORGANIZED HEALTH CARE EDUCATION/TRAINING PROGRAM | Admitting: STUDENT IN AN ORGANIZED HEALTH CARE EDUCATION/TRAINING PROGRAM

## 2021-11-13 ENCOUNTER — APPOINTMENT (OUTPATIENT)
Dept: CT IMAGING | Facility: HOSPITAL | Age: 66
End: 2021-11-13

## 2021-11-13 ENCOUNTER — APPOINTMENT (OUTPATIENT)
Dept: GENERAL RADIOLOGY | Facility: HOSPITAL | Age: 66
End: 2021-11-13

## 2021-11-13 DIAGNOSIS — E83.42 HYPOMAGNESEMIA: ICD-10-CM

## 2021-11-13 DIAGNOSIS — R91.8 PULMONARY NODULES: ICD-10-CM

## 2021-11-13 DIAGNOSIS — K85.90 ACUTE PANCREATITIS WITHOUT INFECTION OR NECROSIS, UNSPECIFIED PANCREATITIS TYPE: Primary | ICD-10-CM

## 2021-11-13 LAB
ALBUMIN SERPL-MCNC: 4.14 G/DL (ref 3.5–5.2)
ALBUMIN/GLOB SERPL: 1.6 G/DL
ALP SERPL-CCNC: 92 U/L (ref 39–117)
ALT SERPL W P-5'-P-CCNC: 14 U/L (ref 1–33)
ANION GAP SERPL CALCULATED.3IONS-SCNC: 12.1 MMOL/L (ref 5–15)
APTT PPP: 25.2 SECONDS (ref 25.5–35.4)
AST SERPL-CCNC: 19 U/L (ref 1–32)
BASOPHILS # BLD AUTO: 0.04 10*3/MM3 (ref 0–0.2)
BASOPHILS NFR BLD AUTO: 0.3 % (ref 0–1.5)
BILIRUB SERPL-MCNC: 0.6 MG/DL (ref 0–1.2)
BUN SERPL-MCNC: 11 MG/DL (ref 8–23)
BUN/CREAT SERPL: 14.3 (ref 7–25)
CALCIUM SPEC-SCNC: 9.3 MG/DL (ref 8.6–10.5)
CHLORIDE SERPL-SCNC: 96 MMOL/L (ref 98–107)
CO2 SERPL-SCNC: 25.9 MMOL/L (ref 22–29)
CREAT SERPL-MCNC: 0.77 MG/DL (ref 0.57–1)
CRP SERPL-MCNC: 4.3 MG/DL (ref 0–0.5)
D-LACTATE SERPL-SCNC: 1 MMOL/L (ref 0.5–2)
DEPRECATED RDW RBC AUTO: 42 FL (ref 37–54)
EOSINOPHIL # BLD AUTO: 0.09 10*3/MM3 (ref 0–0.4)
EOSINOPHIL NFR BLD AUTO: 0.6 % (ref 0.3–6.2)
ERYTHROCYTE [DISTWIDTH] IN BLOOD BY AUTOMATED COUNT: 12.3 % (ref 12.3–15.4)
FLUAV SUBTYP SPEC NAA+PROBE: NOT DETECTED
FLUBV RNA ISLT QL NAA+PROBE: NOT DETECTED
GFR SERPL CREATININE-BSD FRML MDRD: 75 ML/MIN/1.73
GLOBULIN UR ELPH-MCNC: 2.7 GM/DL
GLUCOSE SERPL-MCNC: 139 MG/DL (ref 65–99)
HCT VFR BLD AUTO: 41.6 % (ref 34–46.6)
HGB BLD-MCNC: 14 G/DL (ref 12–15.9)
IMM GRANULOCYTES # BLD AUTO: 0.05 10*3/MM3 (ref 0–0.05)
IMM GRANULOCYTES NFR BLD AUTO: 0.3 % (ref 0–0.5)
INR PPP: 0.99 (ref 0.9–1.1)
LIPASE SERPL-CCNC: 16 U/L (ref 13–60)
LYMPHOCYTES # BLD AUTO: 1.77 10*3/MM3 (ref 0.7–3.1)
LYMPHOCYTES NFR BLD AUTO: 12 % (ref 19.6–45.3)
MAGNESIUM SERPL-MCNC: 1.5 MG/DL (ref 1.6–2.4)
MCH RBC QN AUTO: 31.4 PG (ref 26.6–33)
MCHC RBC AUTO-ENTMCNC: 33.7 G/DL (ref 31.5–35.7)
MCV RBC AUTO: 93.3 FL (ref 79–97)
MONOCYTES # BLD AUTO: 1 10*3/MM3 (ref 0.1–0.9)
MONOCYTES NFR BLD AUTO: 6.8 % (ref 5–12)
NEUTROPHILS NFR BLD AUTO: 11.74 10*3/MM3 (ref 1.7–7)
NEUTROPHILS NFR BLD AUTO: 80 % (ref 42.7–76)
NRBC BLD AUTO-RTO: 0 /100 WBC (ref 0–0.2)
NT-PROBNP SERPL-MCNC: 318 PG/ML (ref 0–900)
PLATELET # BLD AUTO: 178 10*3/MM3 (ref 140–450)
PMV BLD AUTO: 11.7 FL (ref 6–12)
POTASSIUM SERPL-SCNC: 3.9 MMOL/L (ref 3.5–5.2)
PROT SERPL-MCNC: 6.8 G/DL (ref 6–8.5)
PROTHROMBIN TIME: 13.5 SECONDS (ref 12.8–14.5)
RBC # BLD AUTO: 4.46 10*6/MM3 (ref 3.77–5.28)
SARS-COV-2 RNA PNL SPEC NAA+PROBE: NOT DETECTED
SODIUM SERPL-SCNC: 134 MMOL/L (ref 136–145)
TROPONIN T SERPL-MCNC: <0.01 NG/ML (ref 0–0.03)
WBC # BLD AUTO: 14.69 10*3/MM3 (ref 3.4–10.8)

## 2021-11-13 PROCEDURE — 74175 CTA ABDOMEN W/CONTRAST: CPT

## 2021-11-13 PROCEDURE — 85610 PROTHROMBIN TIME: CPT | Performed by: STUDENT IN AN ORGANIZED HEALTH CARE EDUCATION/TRAINING PROGRAM

## 2021-11-13 PROCEDURE — 0 IOPAMIDOL PER 1 ML: Performed by: STUDENT IN AN ORGANIZED HEALTH CARE EDUCATION/TRAINING PROGRAM

## 2021-11-13 PROCEDURE — 85025 COMPLETE CBC W/AUTO DIFF WBC: CPT | Performed by: STUDENT IN AN ORGANIZED HEALTH CARE EDUCATION/TRAINING PROGRAM

## 2021-11-13 PROCEDURE — 85730 THROMBOPLASTIN TIME PARTIAL: CPT | Performed by: STUDENT IN AN ORGANIZED HEALTH CARE EDUCATION/TRAINING PROGRAM

## 2021-11-13 PROCEDURE — 96368 THER/DIAG CONCURRENT INF: CPT

## 2021-11-13 PROCEDURE — 71045 X-RAY EXAM CHEST 1 VIEW: CPT

## 2021-11-13 PROCEDURE — 84484 ASSAY OF TROPONIN QUANT: CPT | Performed by: STUDENT IN AN ORGANIZED HEALTH CARE EDUCATION/TRAINING PROGRAM

## 2021-11-13 PROCEDURE — 86140 C-REACTIVE PROTEIN: CPT | Performed by: STUDENT IN AN ORGANIZED HEALTH CARE EDUCATION/TRAINING PROGRAM

## 2021-11-13 PROCEDURE — 25010000002 HYDROMORPHONE 1 MG/ML SOLUTION: Performed by: STUDENT IN AN ORGANIZED HEALTH CARE EDUCATION/TRAINING PROGRAM

## 2021-11-13 PROCEDURE — 36415 COLL VENOUS BLD VENIPUNCTURE: CPT

## 2021-11-13 PROCEDURE — 80053 COMPREHEN METABOLIC PANEL: CPT | Performed by: STUDENT IN AN ORGANIZED HEALTH CARE EDUCATION/TRAINING PROGRAM

## 2021-11-13 PROCEDURE — 96375 TX/PRO/DX INJ NEW DRUG ADDON: CPT

## 2021-11-13 PROCEDURE — 83605 ASSAY OF LACTIC ACID: CPT | Performed by: STUDENT IN AN ORGANIZED HEALTH CARE EDUCATION/TRAINING PROGRAM

## 2021-11-13 PROCEDURE — 93005 ELECTROCARDIOGRAM TRACING: CPT | Performed by: STUDENT IN AN ORGANIZED HEALTH CARE EDUCATION/TRAINING PROGRAM

## 2021-11-13 PROCEDURE — 83880 ASSAY OF NATRIURETIC PEPTIDE: CPT | Performed by: STUDENT IN AN ORGANIZED HEALTH CARE EDUCATION/TRAINING PROGRAM

## 2021-11-13 PROCEDURE — 87636 SARSCOV2 & INF A&B AMP PRB: CPT | Performed by: STUDENT IN AN ORGANIZED HEALTH CARE EDUCATION/TRAINING PROGRAM

## 2021-11-13 PROCEDURE — 93010 ELECTROCARDIOGRAM REPORT: CPT | Performed by: INTERNAL MEDICINE

## 2021-11-13 PROCEDURE — 83735 ASSAY OF MAGNESIUM: CPT | Performed by: STUDENT IN AN ORGANIZED HEALTH CARE EDUCATION/TRAINING PROGRAM

## 2021-11-13 PROCEDURE — 96365 THER/PROPH/DIAG IV INF INIT: CPT

## 2021-11-13 PROCEDURE — 99284 EMERGENCY DEPT VISIT MOD MDM: CPT

## 2021-11-13 PROCEDURE — 25010000002 MAGNESIUM SULFATE 2 GM/50ML SOLUTION: Performed by: STUDENT IN AN ORGANIZED HEALTH CARE EDUCATION/TRAINING PROGRAM

## 2021-11-13 PROCEDURE — 96366 THER/PROPH/DIAG IV INF ADDON: CPT

## 2021-11-13 PROCEDURE — 83690 ASSAY OF LIPASE: CPT | Performed by: STUDENT IN AN ORGANIZED HEALTH CARE EDUCATION/TRAINING PROGRAM

## 2021-11-13 RX ORDER — ONDANSETRON 2 MG/ML
4 INJECTION INTRAMUSCULAR; INTRAVENOUS ONCE AS NEEDED
Status: COMPLETED | OUTPATIENT
Start: 2021-11-13 | End: 2021-11-14

## 2021-11-13 RX ORDER — MAGNESIUM SULFATE HEPTAHYDRATE 40 MG/ML
2 INJECTION, SOLUTION INTRAVENOUS ONCE
Status: COMPLETED | OUTPATIENT
Start: 2021-11-13 | End: 2021-11-14

## 2021-11-13 RX ORDER — NITROGLYCERIN 20 MG/100ML
5-200 INJECTION INTRAVENOUS
Status: DISCONTINUED | OUTPATIENT
Start: 2021-11-13 | End: 2021-11-13

## 2021-11-13 RX ADMIN — IOPAMIDOL 90 ML: 755 INJECTION, SOLUTION INTRAVENOUS at 22:53

## 2021-11-13 RX ADMIN — NITROGLYCERIN 20 MCG/MIN: 20 INJECTION INTRAVENOUS at 23:32

## 2021-11-13 RX ADMIN — MAGNESIUM SULFATE 2 G: 2 INJECTION INTRAVENOUS at 22:33

## 2021-11-13 RX ADMIN — NITROGLYCERIN 25 MCG/MIN: 20 INJECTION INTRAVENOUS at 23:41

## 2021-11-13 RX ADMIN — NITROGLYCERIN 15 MCG/MIN: 20 INJECTION INTRAVENOUS at 23:22

## 2021-11-13 RX ADMIN — HYDROMORPHONE HYDROCHLORIDE 1 MG: 1 INJECTION, SOLUTION INTRAMUSCULAR; INTRAVENOUS; SUBCUTANEOUS at 22:30

## 2021-11-13 RX ADMIN — SODIUM CHLORIDE 1000 ML: 9 INJECTION, SOLUTION INTRAVENOUS at 23:46

## 2021-11-13 RX ADMIN — NITROGLYCERIN 10 MCG/MIN: 20 INJECTION INTRAVENOUS at 23:15

## 2021-11-13 RX ADMIN — NITROGLYCERIN 5 MCG/MIN: 20 INJECTION INTRAVENOUS at 22:58

## 2021-11-14 VITALS
TEMPERATURE: 97.9 F | HEIGHT: 65 IN | BODY MASS INDEX: 26.66 KG/M2 | SYSTOLIC BLOOD PRESSURE: 146 MMHG | HEART RATE: 48 BPM | RESPIRATION RATE: 20 BRPM | WEIGHT: 160 LBS | OXYGEN SATURATION: 95 % | DIASTOLIC BLOOD PRESSURE: 74 MMHG

## 2021-11-14 LAB
BACTERIA UR QL AUTO: ABNORMAL /HPF
BILIRUB UR QL STRIP: NEGATIVE
CLARITY UR: CLEAR
COLOR UR: YELLOW
GLUCOSE UR STRIP-MCNC: NEGATIVE MG/DL
HGB UR QL STRIP.AUTO: ABNORMAL
HYALINE CASTS UR QL AUTO: ABNORMAL /LPF
KETONES UR QL STRIP: ABNORMAL
LEUKOCYTE ESTERASE UR QL STRIP.AUTO: ABNORMAL
NITRITE UR QL STRIP: NEGATIVE
PH UR STRIP.AUTO: <=5 [PH] (ref 5–8)
PROT UR QL STRIP: NEGATIVE
RBC # UR: ABNORMAL /HPF
REF LAB TEST METHOD: ABNORMAL
SP GR UR STRIP: >1.03 (ref 1–1.03)
SQUAMOUS #/AREA URNS HPF: ABNORMAL /HPF
TROPONIN T SERPL-MCNC: <0.01 NG/ML (ref 0–0.03)
UROBILINOGEN UR QL STRIP: ABNORMAL
WBC UR QL AUTO: ABNORMAL /HPF

## 2021-11-14 PROCEDURE — 25010000002 ONDANSETRON PER 1 MG: Performed by: STUDENT IN AN ORGANIZED HEALTH CARE EDUCATION/TRAINING PROGRAM

## 2021-11-14 PROCEDURE — 81001 URINALYSIS AUTO W/SCOPE: CPT | Performed by: STUDENT IN AN ORGANIZED HEALTH CARE EDUCATION/TRAINING PROGRAM

## 2021-11-14 PROCEDURE — 96375 TX/PRO/DX INJ NEW DRUG ADDON: CPT

## 2021-11-14 RX ORDER — ONDANSETRON 4 MG/1
4 TABLET, ORALLY DISINTEGRATING ORAL EVERY 8 HOURS PRN
Qty: 15 TABLET | Refills: 0 | Status: SHIPPED | OUTPATIENT
Start: 2021-11-14 | End: 2021-11-29

## 2021-11-14 RX ORDER — OXYCODONE HYDROCHLORIDE AND ACETAMINOPHEN 5; 325 MG/1; MG/1
1 TABLET ORAL EVERY 6 HOURS PRN
Qty: 8 TABLET | Refills: 0 | Status: SHIPPED | OUTPATIENT
Start: 2021-11-14 | End: 2021-11-22

## 2021-11-14 RX ADMIN — ONDANSETRON 4 MG: 2 INJECTION INTRAMUSCULAR; INTRAVENOUS at 00:45

## 2021-11-14 NOTE — ED NOTES
Pt declines zofran at this time.      Nimo Ram RN  11/13/21 2246       Nimo Ram RN  11/13/21 2240

## 2021-11-14 NOTE — ED NOTES
Pt reports pain in midsternum and epigastric area along with back and abdomen. Pt denies pain medicine and nausea medicine at this time.      Nimo Ram RN  11/13/21 1974

## 2021-11-14 NOTE — DISCHARGE INSTRUCTIONS
You have acute pancreatitis.  Your lipase is 16 which is a marker of injury to your pancreas and is within normal limits.  Do not drink alcohol.  Follow-up with your gastroenterologist.    Please call the attached number for patient connection to schedule an appointment and establish care with a primary care provider.    Please discuss with your pulmonologist regarding your lung nodules.    Zofran prescription provided for nausea.  Pain medication will be sent to your pharmacy.  Do not drive while taking this medication.    CT read as follows  IMPRESSION:  1.  No evidence of aortic dissection or aortic aneurysm. Atherosclerotic changes without flow-limiting stenosis.  2.  Inflammatory changes about the pancreatic body and pancreatic tail most consistent with pancreatitis. Correlate clinically.  3.  Advanced emphysema.  4.  Nonspecific urinary bladder wall thickening. Correlate with urinalysis as cystitis cannot be excluded.  5.  Fatty infiltration of the wall of the large bowel could reflect changes associated with chronic inflammation but no acute inflammation is seen.  6.  A few scattered pulmonary nodules measure less than 6 mm. These can be followed on CT chest in 12 months as per 2017 Fleishner Society criteria.

## 2021-11-14 NOTE — ED NOTES
Gave patient a cup to provide urine sample at this time. Pt states she is unable to.      Nimo Ram RN  11/13/21 [id number]

## 2021-11-14 NOTE — ED PROVIDER NOTES
[facility]  emergency department encounter        Pt Name: Itzel Hagen  MRN: [id number]  Birthdate 1955  Date of evaluation: 11/14/2021    Chief Complaint   Patient presents with   • Chest Pain   • Abdominal Pain             HISTORY OF PRESENT ILLNESS:   Itzel Hagen is a 66 y.o. female PMH HTN, HLD, COPD, active smoker presents complaining of severe chest abdominal pain radiating to the back.  Pain new onset earlier this morning and has significantly worsened.  Pain worse in the abdomen.  Pain is more of an ache than her pain or tearing sensation.  She presents in mild distress and in significant discomfort.  She endorses associated dizziness and nausea without vomiting.  She has chronic cough.  She denies fever chills congestion rhinorrhea shortness of breath vomiting diarrhea dysuria myalgias headache.  She denies focal weakness numbness tingling.  Denies prior known vascular disease.  Heart rate is in the low 60s on arrival.  Takes metoprolol.  Blood pressure 164/75.    Stress test 1 month ago 10/12/2021 with normal myocardial perfusion no evidence of ischemia.  Bilateral carotid mild to moderate bilateral plaques without occlusion or hemodynamically significant stenosis.    No other exacerbating or alleviating factors other than as noted above.  Severity: Severe    PCP: Provider, No Known          REVIEW OF SYSTEMS:     Review of Systems   Constitutional: Negative for fever.   HENT: Negative for congestion and rhinorrhea.    Respiratory: Positive for cough (Chronic). Negative for shortness of breath.    Cardiovascular: Positive for chest pain.   Gastrointestinal: Positive for abdominal pain and nausea. Negative for vomiting.   Genitourinary: Negative for dysuria.   Musculoskeletal: Positive for back pain. Negative for myalgias.   Skin: Negative for rash.   Neurological: Positive for dizziness. Negative for headaches.   Psychiatric/Behavioral: Negative for confusion.       Review of  systems otherwise as per HPI.          PREVIOUS HISTORY:         Past Medical History:   Diagnosis Date   • Acute pancreatitis    • Angina, class III (HCC)    • Back pain    • Bilateral cataracts    • Chest pain    • Chronic bronchitis (HCC)    • Cigarette nicotine dependence    • COPD (chronic obstructive pulmonary disease) ([facility])    • Current smoker    • Essential hypertension 8/3/2021   • Full dentures    • GERD (gastroesophageal reflux disease)    • H/O cardiac catheterization 2019    Dr. Izquierdo, Conclusion: 1.Overall mild coronary artery disease with right dominant system.   • Heart palpitations    • History of nuclear stress test 2019    ·Left ventricular ejection fraction is hyperdynamic (Calculated EF > 70%). Impressions are consistent with a low risk study.   • Hyperlipidemia    • PONV (postoperative nausea and vomiting)    • Precordial chest pain    • SOB (shortness of breath)            Past Surgical History:   Procedure Laterality Date   • APPENDECTOMY     • BACK SURGERY      Removed bone spurs/cleaned up the area   • CARDIAC CATHETERIZATION N/A 2019    Procedure: Left Heart Cath;  Surgeon: Dre Izquierdo MD;  Location: formerly [facility] INVASIVE LOCATION;  Service: Cardiology   •  SECTION  ,    • CHOLECYSTECTOMY      + stones   • ENDOSCOPY     • ENDOSCOPY N/A 10/15/2019    Procedure: ESOPHAGOGASTRODUODENOSCOPY WITH BIOPSY AND ESOPHAGEAL DILATATION;  Surgeon: Ambrocio Solorzano MD;  Location: [facility] ENDOSCOPY;  Service: Gastroenterology   • HYSTERECTOMY     • SHOULDER ACROMIOPLASTY     • UPPER GASTROINTESTINAL ENDOSCOPY  10/15/2019           Social History     Socioeconomic History   • Marital status:    Tobacco Use   • Smoking status: Current Every Day Smoker     Packs/day: 1.50     Types: Cigarettes     Start date:    • Smokeless tobacco: Never Used   Vaping Use   • Vaping Use: Never used   Substance and Sexual Activity   • Alcohol use: No     Comment:  "\"not in 30 years\"   • Drug use: No   • Sexual activity: Defer           Family History   Problem Relation Age of Onset   • Heart disease Mother    • Heart disease Father    • Heart disease Brother    • Cirrhosis Neg Hx    • Liver cancer Neg Hx    • Liver disease Neg Hx    • Colon cancer Neg Hx    • Crohn's disease Neg Hx    • Ulcerative colitis Neg Hx    • Esophageal cancer Neg Hx    • Stomach cancer Neg Hx          Current Outpatient Medications   Medication Instructions   • ALBUTEROL SULFATE IN Inhalation, As needed every 4-6 hours    • aspirin 81 mg, Oral, Daily   • atorvastatin (LIPITOR) 80 mg, Oral, Daily   • doxycycline (VIBRAMYCIN) 100 mg, Oral, 2 Times Daily   • ipratropium-albuterol (DUO-NEB) 0.5-2.5 mg/3 ml nebulizer 3 mL, Nebulization, 4 Times Daily PRN   • isosorbide mononitrate (IMDUR) 120 MG 24 hr tablet TAKE 1 TABLET EVERY MORNING   • metoprolol succinate XL (TOPROL-XL) 25 mg, Oral, Daily   • Multiple Vitamins-Minerals (ALIVE WOMENS ENERGY PO) 1 tablet, Oral, Daily   • Naproxen Sodium (ALEVE PO) Oral, Daily   • nitroglycerin (NITROSTAT) 0.4 mg, Sublingual, Every 5 Minutes PRN, Take no more than 3 doses in 15 minutes.    • ondansetron ODT (ZOFRAN-ODT) 4 mg, Translingual, Every 8 Hours PRN   • oxyCODONE-acetaminophen (PERCOCET) 5-325 MG per tablet 1 tablet, Oral, Every 6 Hours PRN   • pantoprazole (PROTONIX) 40 mg, Oral, Every Morning Before Breakfast, Take 1 tablet 30 minutes before breakfast daily.     • umeclidinium-vilanterol (ANORO ELLIPTA) 62.5-25 MCG/INH aerosol powder  inhaler 1 puff, Inhalation, Daily - RT   • Vitamins C E (VITAMIN C & E COMBINATION PO) Oral, Daily         Allergies:  Penicillins, Coconut, and Morphine    Medications, allergies and past medical, surgical, family, and social history reviewed.        PHYSICAL EXAM:     Physical Exam  Vitals and nursing note reviewed.   Constitutional:       General: She is in acute distress.   HENT:      Head: Normocephalic and atraumatic.      " Mouth/Throat:      Mouth: Mucous membranes are moist.   Eyes:      Extraocular Movements: Extraocular movements intact.      Conjunctiva/sclera: Conjunctivae normal.   Cardiovascular:      Rate and Rhythm: Normal rate and regular rhythm.      Comments: No anterior chest wall tenderness to palpation.  Pulmonary:      Effort: Pulmonary effort is normal. No respiratory distress.      Breath sounds: No stridor. No wheezing, rhonchi or rales.   Chest:      Chest wall: No tenderness.   Abdominal:      General: Abdomen is flat. There is no distension.      Palpations: Abdomen is soft.      Tenderness: There is abdominal tenderness.   Musculoskeletal:         General: No deformity. Normal range of motion.      Cervical back: Normal range of motion. No rigidity.      Right lower leg: No edema.      Left lower leg: No edema.      Comments: No tenderness to palpation in her back however endorses mild pain with pressure along the area of her left paraspinal muscles.   Neurological:      General: No focal deficit present.      Mental Status: She is alert and oriented to person, place, and time.   Psychiatric:         Mood and Affect: Mood normal.         Behavior: Behavior normal.             COMPLETED DIAGNOSTIC STUDIES AND INTERVENTIONS:     Lab Results (last 24 hours)     Procedure Component Value Units Date/Time    CBC & Differential [[id number]]  (Abnormal) Collected: 11/13/21 2148    Specimen: Blood from Arm, Left Updated: 11/13/21 2155    Narrative:      The following orders were created for panel order CBC & Differential.  Procedure                               Abnormality         Status                     ---------                               -----------         ------                     CBC Auto Differential[[id number]]        Abnormal            Final result                 Please view results for these tests on the individual orders.    Comprehensive Metabolic Panel [[id number]]  (Abnormal) Collected: 11/13/21 2148     Specimen: Blood from Arm, Left Updated: 11/13/21 2216     Glucose 139 mg/dL      BUN 11 mg/dL      Creatinine 0.77 mg/dL      Sodium 134 mmol/L      Potassium 3.9 mmol/L      Chloride 96 mmol/L      CO2 25.9 mmol/L      Calcium 9.3 mg/dL      Total Protein 6.8 g/dL      Albumin 4.14 g/dL      ALT (SGPT) 14 U/L      AST (SGOT) 19 U/L      Alkaline Phosphatase 92 U/L      Total Bilirubin 0.6 mg/dL      eGFR Non African Amer 75 mL/min/1.73      Globulin 2.7 gm/dL      A/G Ratio 1.6 g/dL      BUN/Creatinine Ratio 14.3     Anion Gap 12.1 mmol/L     Narrative:      GFR Normal >60  Chronic Kidney Disease <60  Kidney Failure <15      Protime-INR [[id number]]  (Normal) Collected: 11/13/21 2148    Specimen: Blood from Arm, Left Updated: 11/13/21 2205     Protime 13.5 Seconds      INR 0.99    Narrative:      Suggested INR therapeutic range for stable oral anticoagulant therapy:    Low Intensity therapy:   1.5-2.0  Moderate Intensity therapy:   2.0-3.0  High Intensity therapy:   2.5-4.0    aPTT [[id number]]  (Abnormal) Collected: 11/13/21 2148    Specimen: Blood from Arm, Left Updated: 11/13/21 2205     PTT 25.2 seconds     Narrative:      PTT Heparin Therapeutic Range:  59 - 95 seconds      Lipase [[id number]]  (Normal) Collected: 11/13/21 2148    Specimen: Blood from Arm, Left Updated: 11/13/21 2216     Lipase 16 U/L     C-reactive Protein [[id number]]  (Abnormal) Collected: 11/13/21 2148    Specimen: Blood from Arm, Left Updated: 11/13/21 2216     C-Reactive Protein 4.30 mg/dL     BNP [[id number]]  (Normal) Collected: 11/13/21 2148    Specimen: Blood from Arm, Left Updated: 11/13/21 2214     proBNP 318.0 pg/mL     Narrative:      Among patients with dyspnea, NT-proBNP is highly sensitive for the detection of acute congestive heart failure. In addition NT-proBNP of <300 pg/ml effectively rules out acute congestive heart failure with 99% negative predictive value.    Results may be falsely decreased if patient taking Biotin.       Troponin [[id number]]  (Normal) Collected: 11/13/21 2148    Specimen: Blood from Arm, Left Updated: 11/13/21 2216     Troponin T <0.010 ng/mL     Narrative:      Troponin T Reference Range:  <= 0.03 ng/mL-   Negative for AMI  >0.03 ng/mL-     Abnormal for myocardial necrosis.  Clinicians would have to utilize clinical acumen, EKG, Troponin and serial changes to determine if it is an Acute Myocardial Infarction or myocardial injury due to an underlying chronic condition.       Results may be falsely decreased if patient taking Biotin.      Magnesium [[id number]]  (Abnormal) Collected: 11/13/21 2148    Specimen: Blood from Arm, Left Updated: 11/13/21 2216     Magnesium 1.5 mg/dL     CBC Auto Differential [[id number]]  (Abnormal) Collected: 11/13/21 2148    Specimen: Blood from Arm, Left Updated: 11/13/21 2155     WBC 14.69 10*3/mm3      RBC 4.46 10*6/mm3      Hemoglobin 14.0 g/dL      Hematocrit 41.6 %      MCV 93.3 fL      MCH 31.4 pg      MCHC 33.7 g/dL      RDW 12.3 %      RDW-SD 42.0 fl      MPV 11.7 fL      Platelets 178 10*3/mm3      Neutrophil % 80.0 %      Lymphocyte % 12.0 %      Monocyte % 6.8 %      Eosinophil % 0.6 %      Basophil % 0.3 %      Immature Grans % 0.3 %      Neutrophils, Absolute 11.74 10*3/mm3      Lymphocytes, Absolute 1.77 10*3/mm3      Monocytes, Absolute 1.00 10*3/mm3      Eosinophils, Absolute 0.09 10*3/mm3      Basophils, Absolute 0.04 10*3/mm3      Immature Grans, Absolute 0.05 10*3/mm3      nRBC 0.0 /100 WBC     COVID PRE-OP / PRE-PROCEDURE SCREENING ORDER (NO ISOLATION) - Swab, Nasopharynx [[id number]]  (Normal) Collected: 11/13/21 2149    Specimen: Swab from Nasopharynx Updated: 11/13/21 2214    Narrative:      The following orders were created for panel order COVID PRE-OP / PRE-PROCEDURE SCREENING ORDER (NO ISOLATION) - Swab, Nasopharynx.  Procedure                               Abnormality         Status                     ---------                               -----------          ------                     COVID-19 and FLU A/B PCR...[[id number]]  Normal              Final result                 Please view results for these tests on the individual orders.    COVID-19 and FLU A/B PCR - Swab, Nasopharynx [[id number]]  (Normal) Collected: 11/13/21 2149    Specimen: Swab from Nasopharynx Updated: 11/13/21 2214     COVID19 Not Detected     Influenza A PCR Not Detected     Influenza B PCR Not Detected    Narrative:      Fact sheet for providers: https://www.fda.gov/media/634742/download    Fact sheet for patients: https://www.fda.gov/media/376974/download    Test performed by PCR.    Lactic Acid, Plasma [[id number]]  (Normal) Collected: 11/13/21 2307    Specimen: Blood Updated: 11/13/21 2327     Lactate 1.0 mmol/L     Troponin [[id number]]  (Normal) Collected: 11/13/21 2352    Specimen: Blood from Arm, Right Updated: 11/14/21 0015     Troponin T <0.010 ng/mL     Narrative:      Troponin T Reference Range:  <= 0.03 ng/mL-   Negative for AMI  >0.03 ng/mL-     Abnormal for myocardial necrosis.  Clinicians would have to utilize clinical acumen, EKG, Troponin and serial changes to determine if it is an Acute Myocardial Infarction or myocardial injury due to an underlying chronic condition.       Results may be falsely decreased if patient taking Biotin.      Urinalysis With Culture If Indicated - Urine, Clean Catch [[id number]]  (Abnormal) Collected: 11/14/21 0040    Specimen: Urine, Clean Catch Updated: 11/14/21 0049     Color, UA Yellow     Appearance, UA Clear     pH, UA <=5.0     Specific Gravity, UA >1.030     Glucose, UA Negative     Ketones, UA 15 mg/dL (1+)     Bilirubin, UA Negative     Blood, UA Trace     Protein, UA Negative     Leuk Esterase, UA Small (1+)     Nitrite, UA Negative     Urobilinogen, UA 1.0 E.U./dL    Urinalysis, Microscopic Only - Urine, Clean Catch [[id number]]  (Abnormal) Collected: 11/14/21 0040    Specimen: Urine, Clean Catch Updated: 11/14/21 0110     RBC, UA 3-5 /HPF       WBC, UA 3-5 /HPF      Bacteria, UA Trace /HPF      Squamous Epithelial Cells, UA 3-6 /HPF      Hyaline Casts, UA None Seen /LPF      Methodology Manual Light Microscopy            CT Angiogram Aorta   Final Result   1.  No evidence of aortic dissection or aortic aneurysm. Atherosclerotic changes without flow-limiting stenosis.   2.  Inflammatory changes about the pancreatic body and pancreatic tail most consistent with pancreatitis. Correlate clinically.   3.  Advanced emphysema.   4.  Nonspecific urinary bladder wall thickening. Correlate with urinalysis as cystitis cannot be excluded.   5.  Fatty infiltration of the wall of the large bowel could reflect changes associated with chronic inflammation but no acute inflammation is seen.   6.  A few scattered pulmonary nodules measure less than 6 mm. These can be followed on CT chest in 12 months as per 2017 Fleishner Society criteria.      Signer Name: FACUNDO PALACIOS MD    Signed: 11/13/2021 11:21 PM    Workstation Name: [facility]     [facility]      XR Chest 1 View   Final Result   No acute cardiopulmonary findings.      Signer Name: Pacheco Valadez MD    Signed: 11/13/2021 10:43 PM    Workstation Name: [facility]     [facility]            New Medications Ordered This Visit   Medications   • HYDROmorphone (DILAUDID) injection 1 mg   • magnesium sulfate 2g/50 mL (PREMIX) infusion   • ondansetron (ZOFRAN) injection 4 mg   • iopamidol (ISOVUE-370) 76 % injection 100 mL   • sodium chloride 0.9 % bolus 1,000 mL   • ondansetron ODT (ZOFRAN-ODT) 4 MG disintegrating tablet     Sig: Place 1 tablet on the tongue Every 8 (Eight) Hours As Needed for Nausea or Vomiting for up to 15 days.     Dispense:  15 tablet     Refill:  0   • oxyCODONE-acetaminophen (PERCOCET) 5-325 MG per tablet     Sig: Take 1 tablet by mouth Every 6 (Six) Hours As Needed for Severe Pain  for up to 8 days.     Dispense:  8 tablet     Refill:  0          Procedures            MEDICAL DECISION-MAKING AND ED COURSE:     ED Course as of 11/14/21 0113   Sat Nov 13, 2021 2157 MDM: 66-year-old female with past medical history of HTN, HLD, COPD, active smoker presents complaining of severe chest and abdominal pain radiating to her back new onset this morning and progressively worsening.  Patient appears in mild distress.  Primary concern for aortic dissection, CTA ordered.  Heart rate not elevated in the 60s however tachycardic response may be suppressed by extended release metoprolol. [KP]   2201 WBC(!): 14.69 [KP]   2201 Hemoglobin: 14.0 [KP]   2201 Platelets: 178 [KP]   2222 COVID19: Not Detected [KP]   2223 Troponin T: <0.010 [KP]   2223 Patient complains of severely worsening pain.  She had initially declined medications for pain or nausea but now endorses need for pain control.  Will administer 1 mg of Dilaudid.  Will not wait for imaging will start nitroglycerin gtt for SBP >110.  Heart rate remains low 60s. [KP]   2224 Magnesium(!): 1.5 [KP]   2224 Replete magnesium 2 g IV [KP]   2229 EKG normal sinus rhythm.  Rate 62 bpm.  QRS 82 ms.  QTc 406 ms.  No acute ST abnormality []   2339 Lactate: 1.0 [KP]   2340 CT IMPRESSION:  1.  No evidence of aortic dissection or aortic aneurysm. Atherosclerotic changes without flow-limiting stenosis.  2.  Inflammatory changes about the pancreatic body and pancreatic tail most consistent with pancreatitis. Correlate clinically.  3.  Advanced emphysema.  4.  Nonspecific urinary bladder wall thickening. Correlate with urinalysis as cystitis cannot be excluded.  5.  Fatty infiltration of the wall of the large bowel could reflect changes associated with chronic inflammation but no acute inflammation is seen.  6.  A few scattered pulmonary nodules measure less than 6 mm. These can be followed on CT chest in 12 months as per 2017 Fleishner Society criteria. [KP]   Sun Nov 14, 2021 0035 Troponin T: <0.010 [KP]   0035  Reassessed patient, doing significantly better after dose of Dilaudid.  Patient's abdominal exam is now more focal endorsing tenderness in the epigastric region.  Again more focal tenderness over the epigastrium and then right upper quadrant.  Negative Combs.  Exam and CT consistent with pancreatitis despite lipase WNL.  Patient denies alcohol use.  Will hydrate patient with 1 L normal saline.  Given significant improvement in pain, reassuring vital signs, otherwise reassuring work-up will discharge patient home to follow-up with primary care provider.  Of note CT read expresses concern for bladder inflammation but patient has no suprapubic tenderness, denies dysuria.  Incidental findings of lung nodules, have discussed with patient need for follow-up.  Patient follows with a pulmonologist which she expects is aware.  Also discussed with patient chronic findings of bowel inflammation.  Again recommend she follow-up with her primary care provider. [KP]   0053 Leukocytes, UA(!): Small (1+) [KP]   0053 Nitrite, UA: Negative [KP]   0111 WBC, UA(!): 3-5 [KP]   0111 Bacteria, UA(!): Trace [KP]   0111 RBC, UA(!): 3-5 [KP]   0111 Squamous Epithelial Cells, UA(!): 3-6 [KP]   0111 UA mildly contaminated, WBC <10, not consistent with UTI [KP]   0113 Discharged with pain control nausea control.  Patient reports pain has resolved at this time. [KP]      ED Course User Index  [KP] Castro West MD  [SF] Kai Brunner, DO       ?      FINAL IMPRESSION:       1. Acute pancreatitis without infection or necrosis, unspecified pancreatitis type    2. Hypomagnesemia    3. Pulmonary nodules         The complaints listed here are new problems to this examiner.      FOLLOW-UP  PATIENT CONNECTION - [place]  See Provider List  [place] [address]  [phone]    If you do not have a primary care provider, call the attached number to schedule an appointment and establish care with a primary care provider.      DISPOSITION  ED  Disposition     ED Disposition Condition Comment    Discharge Stable                 This care is provided during an unprecedented national emergency due to the Novel Coronavirus (COVID-19). COVID-19 infections and transmission risks place heavy strains on healthcare resources. As this pandemic evolves, the Hospital and providers strive to respond fluidly, to remain operational, and to provide care relative to available resources and information. Outcomes are unpredictable and treatments are without well-defined guidelines. Further, the impact of COVID-19 on all aspects of emergency care, including the impact to patients seeking care for reasons other than COVID-19, is unavoidable during this national emergency.    This note was dictated using a [id number] tool. Occasional wrong-word or 'sound-a-like' substitutions may have occurred due to the inherent limitations of voice recognition software. ?Read the chart carefully and recognize, using context, where substitutions have occurred.    Castro West MD  01:13 EST  11/14/2021             Castro West MD  11/14/21 0113

## 2021-11-15 ENCOUNTER — OFFICE VISIT (OUTPATIENT)
Dept: PULMONOLOGY | Facility: CLINIC | Age: 66
End: 2021-11-15

## 2021-11-15 VITALS
DIASTOLIC BLOOD PRESSURE: 76 MMHG | HEART RATE: 61 BPM | BODY MASS INDEX: 28.99 KG/M2 | WEIGHT: 174 LBS | TEMPERATURE: 97.3 F | HEIGHT: 65 IN | SYSTOLIC BLOOD PRESSURE: 128 MMHG | OXYGEN SATURATION: 95 %

## 2021-11-15 DIAGNOSIS — J44.9 CHRONIC OBSTRUCTIVE PULMONARY DISEASE, UNSPECIFIED COPD TYPE (HCC): Primary | ICD-10-CM

## 2021-11-15 DIAGNOSIS — R91.8 MULTIPLE PULMONARY NODULES: ICD-10-CM

## 2021-11-15 DIAGNOSIS — F17.210 CIGARETTE NICOTINE DEPENDENCE WITHOUT COMPLICATION: ICD-10-CM

## 2021-11-15 PROBLEM — J43.2 CENTRILOBULAR EMPHYSEMA: Status: RESOLVED | Noted: 2020-06-01 | Resolved: 2021-11-15

## 2021-11-15 PROCEDURE — 99214 OFFICE O/P EST MOD 30 MIN: CPT | Performed by: NURSE PRACTITIONER

## 2021-11-15 RX ORDER — DOXYCYCLINE HYCLATE 100 MG/1
100 CAPSULE ORAL 2 TIMES DAILY
Qty: 20 CAPSULE | Refills: 0 | Status: SHIPPED | OUTPATIENT
Start: 2021-11-15 | End: 2022-02-24

## 2021-11-15 RX ORDER — IPRATROPIUM BROMIDE AND ALBUTEROL SULFATE 2.5; .5 MG/3ML; MG/3ML
3 SOLUTION RESPIRATORY (INHALATION) 4 TIMES DAILY PRN
Qty: 120 ML | Refills: 5 | Status: SHIPPED | OUTPATIENT
Start: 2021-11-15 | End: 2022-05-18 | Stop reason: SDUPTHER

## 2021-11-15 RX ORDER — PREDNISONE 20 MG/1
40 TABLET ORAL DAILY
Qty: 10 TABLET | Refills: 0 | Status: SHIPPED | OUTPATIENT
Start: 2021-11-15 | End: 2022-02-24

## 2021-11-15 NOTE — PROGRESS NOTES
"Chief Complaint  No chief complaint on file.    Subjective     {Problem List  Visit Diagnosis   Encounters  Notes  Medications  Labs  Result Review Imaging  Media :23}     Itzel Hagen presents to [facility] PULMONARY & CRITICAL CARE MEDICINE  History of Present Illness       Ms. Hagen is a 66-year-old female with medical history significant for COPD, hypertension, GERD, hyperlipidemia and tobacco abuse.    She presents today for routine follow-up on COPD.  She states that her breathing has been doing well since her last visit.  She reports being in the emergency department last week due to acute pancreatitis.  She states that she is feeling much better now.  She continues on Anoro once daily, albuterol and DuoNeb's every 4 hours as needed.  Remains a current smoker of about half pack per day.  While in the emergency department a CT chest angiogram was completed which showed multiple pulmonary nodules all measuring less than 6 mm.          Objective   Vital Signs:   /76 (BP Location: Right arm, Patient Position: Sitting, Cuff Size: Adult)   Pulse 61   Temp 97.3 °F (36.3 °C)   Ht 165.1 cm (65\")   Wt 78.9 kg (174 lb)   SpO2 95%   BMI 28.96 kg/m²     Physical Exam     GENERAL APPEARANCE: Well developed, well nourished, alert and cooperative, and appears to be in no acute distress.    HEAD: normocephalic. Atraumatic.    EYES: PERRL, EOMI. Vision is grossly intact.    THROAT: Oral cavity and pharynx normal. No inflammation, swelling, exudate, or lesions.     NECK: Neck supple.  No thyromegaly.    CARDIAC: Normal S1 and S2. No S3, S4 or murmurs. Rhythm is regular.     RESPIRATORY:Bilateral air entry positive. Bilateral diminished breath sounds. No wheezing, crackles or rhonchi noted.    GI: Positive bowel sounds. Soft, nondistended, nontender.     MUSCULOSKELETAL: No significant deformity or joint abnormality. No edema. Peripheral pulses intact. No " varicosities.    NEUROLOGICAL: Strength and sensation symmetric and intact throughout.     PSYCHIATRIC: The mental examination revealed the patient was oriented to person, place, and time.     Result Review :   The following data was reviewed by: DEWAYNE Trevino on 11/15/2021:  Common labs    Common Labsle 11/13/21 11/13/21    2148 2148   Glucose  139 (A)   BUN  11   Creatinine  0.77   eGFR Non African Am  75   Sodium  134 (A)   Potassium  3.9   Chloride  96 (A)   Calcium  9.3   Albumin  4.14   Total Bilirubin  0.6   Alkaline Phosphatase  92   AST (SGOT)  19   ALT (SGPT)  14   WBC 14.69 (A)    Hemoglobin 14.0    Hematocrit 41.6    Platelets 178    (A) Abnormal value              Reviewed chest x-ray         Assessment and Plan    Diagnoses and all orders for this visit:    1. Chronic obstructive pulmonary disease, unspecified COPD type (HCC) (Primary)    2. Cigarette nicotine dependence without complication    3. Multiple pulmonary nodules    Other orders  -     ipratropium-albuterol (DUO-NEB) 0.5-2.5 mg/3 ml nebulizer; Take 3 mL by nebulization 4 (Four) Times a Day As Needed for Wheezing.  Dispense: 120 mL; Refill: 5  -     doxycycline (VIBRAMYCIN) 100 MG capsule; Take 1 capsule by mouth 2 (Two) Times a Day.  Dispense: 20 capsule; Refill: 0  -     predniSONE (DELTASONE) 20 MG tablet; Take 2 tablets by mouth Daily.  Dispense: 10 tablet; Refill: 0             COPD, unspecified type:  -Continue Anoro once daily.  -Continue albuterol every 4 hours as needed.  -Continue DuoNebs every 4 hours as needed.  We will send refills to [facility].    We will also refill her prednisone and doxycycline for her COPD rescue kit.  These refills were sent to [facility].      Multiple pulmonary nodules:  We will repeat CT chest in 6 months.    Current smoker:  - currently smoking about half pack per day.    Itzel Hagen  reports that she has been smoking cigarettes. She started smoking about 50 years ago. She has  been smoking about 1.50 packs per day. She has never used smokeless tobacco.. I have educated her on the risk of diseases from using tobacco products such as cancer, COPD and heart disease.     I advised her to quit and she is not willing to quit.    Patient's Body mass index is 28.96 kg/m². indicating that she is overweight (BMI 25-29.9). Obesity-related health conditions include the following: hypertension and dyslipidemias. Obesity is unchanged. BMI is is above average; BMI management plan is completed. We discussed portion control and increasing exercise..               Follow Up   Return in about 6 months (around 5/15/2022).  Patient was given instructions and counseling regarding her condition or for health maintenance advice. Please see specific information pulled into the AVS if appropriate.

## 2021-11-16 LAB
QT INTERVAL: 400 MS
QTC INTERVAL: 406 MS

## 2021-11-24 ENCOUNTER — OFFICE VISIT (OUTPATIENT)
Dept: GASTROENTEROLOGY | Facility: CLINIC | Age: 66
End: 2021-11-24

## 2021-11-24 VITALS
HEART RATE: 65 BPM | SYSTOLIC BLOOD PRESSURE: 144 MMHG | RESPIRATION RATE: 18 BRPM | DIASTOLIC BLOOD PRESSURE: 70 MMHG | TEMPERATURE: 98.5 F | HEIGHT: 65 IN | BODY MASS INDEX: 28.82 KG/M2 | WEIGHT: 173 LBS

## 2021-11-24 DIAGNOSIS — Z12.11 ENCOUNTER FOR SCREENING FOR MALIGNANT NEOPLASM OF COLON: ICD-10-CM

## 2021-11-24 DIAGNOSIS — R93.3 ABNORMAL CT SCAN, COLON: ICD-10-CM

## 2021-11-24 DIAGNOSIS — K22.10 ESOPHAGITIS, EROSIVE: ICD-10-CM

## 2021-11-24 DIAGNOSIS — K85.90 ACUTE PANCREATITIS, UNSPECIFIED COMPLICATION STATUS, UNSPECIFIED PANCREATITIS TYPE: Primary | ICD-10-CM

## 2021-11-24 DIAGNOSIS — K21.00 GASTROESOPHAGEAL REFLUX DISEASE WITH ESOPHAGITIS WITHOUT HEMORRHAGE: ICD-10-CM

## 2021-11-24 PROCEDURE — 99214 OFFICE O/P EST MOD 30 MIN: CPT | Performed by: NURSE PRACTITIONER

## 2021-11-24 RX ORDER — PANTOPRAZOLE SODIUM 20 MG/1
TABLET, DELAYED RELEASE ORAL
Qty: 60 TABLET | Refills: 3 | Status: SHIPPED | OUTPATIENT
Start: 2021-11-24 | End: 2021-11-24 | Stop reason: ALTCHOICE

## 2021-11-24 RX ORDER — PANTOPRAZOLE SODIUM 20 MG/1
20 TABLET, DELAYED RELEASE ORAL 2 TIMES DAILY
Qty: 180 TABLET | Refills: 1 | Status: SHIPPED | OUTPATIENT
Start: 2021-11-24 | End: 2022-01-28

## 2021-11-24 NOTE — PROGRESS NOTES
"     Follow Up Note     Date: 2021   Patient Name: Itzel Hagen  MRN: [id number]  : 1955     Primary Care Provider: Provider, No Known     Chief Complaint   Patient presents with   • Pancreatitis     History of present illness:   2021  Itzel Hagen is a 66 y.o. female who is here today for follow up for pancreatitis.    She went to the ER about 11 days ago with abdominal pain, back pain and chest pain. She was told she had pancreatitis. She was given a \"pain shot\" and a few days later the pain was gone and has not returned. She had nausea after the pain shot, but no other nausea. No history of rectal bleeding or melena. She denies alcohol use. She states she had her cholesterol levels checked a few months ago and was told levels were ok. She is scheduled for follow up labs with lipid panel in the next week or so. No new medications.     She has been taking Pantoprazole 40 mg every morning and has to take a Pantoprazole 20 mg at night due to severe reflux that is not controlled.  Denies difficulty swallowing.     No history of constipation or diarrhea.     Interval History:  2021  Today, she reports that dysphagia is intermittent and mild.  She would like a refill on her Protonix 40 mg once daily that she takes for GERD.  In the past, she is taken Protonix 20 mg without relief and tried discontinuing this medication altogether and had very severe acid reflux symptoms and could not go without it.  She is in a hurry today due to caring for her elderly mother and father.  After her last GI visit in , she was supposed to have colonoscopy for colon cancer screening completed but she did not.  She does not want to have this arranged at this time.  Denies any current rectal bleeding, change in bowel habits or abdominal pain.     2020  There is a long standing history of heartburn. Heartburn is severe. Reflux is worse at night. The patient is taking Pantoprazole once a day and and " Pepcid twice a day with moderate control of heartburn.      The patient denies recent change in bowel habits. There is no diarrhea or constipation. There is no history of abdominal pain. There is no history of overt GI bleed (hematemesis melena or hematochezia). The patient denies nausea or vomiting. The patient denies dysphagia or odynophagia. There is no history of recent significant weight loss. There is no history of liver disease in the past. There is no family history of colon cancer. The patient has not had a colonoscopy in the past.     She complains of coughing and heartburn. She reports no abdominal pain, no chest pain or no nausea. This is a chronic problem. Episode onset: over 5 years ago. The problem occurs occasionally. The problem has been gradually improving. The heartburn duration is an hour. The heartburn is located in the substernum. The heartburn is of severe intensity. The heartburn wakes her from sleep. Nothing aggravates the symptoms. Pertinent negatives include no fatigue. There are no known risk factors. She has tried a histamine-2 antagonist and a PPI for the symptoms. The treatment provided moderate relief. Past procedures include an abdominal ultrasound (2019) and an EGD (10/15/2019).     Subjective      Past Medical History:   Diagnosis Date   • Acute pancreatitis    • Angina, class III (HCC)    • Back pain    • Bilateral cataracts    • Chest pain    • Chronic bronchitis (HCC)    • Cigarette nicotine dependence    • COPD (chronic obstructive pulmonary disease) ([facility])    • Current smoker    • Essential hypertension 8/3/2021   • Full dentures    • GERD (gastroesophageal reflux disease)    • H/O cardiac catheterization 09/06/2019    Dr. Izquierdo, Conclusion: 1.Overall mild coronary artery disease with right dominant system.   • Heart palpitations    • History of nuclear stress test 08/28/2019    ·Left ventricular ejection fraction is hyperdynamic (Calculated EF > 70%). Impressions are  "consistent with a low risk study.   • Hyperlipidemia    • PONV (postoperative nausea and vomiting)    • Precordial chest pain    • SOB (shortness of breath)      Past Surgical History:   Procedure Laterality Date   • APPENDECTOMY     • BACK SURGERY      Removed bone spurs/cleaned up the area   • CARDIAC CATHETERIZATION N/A 2019    Procedure: Left Heart Cath;  Surgeon: Dre Izquierdo MD;  Location: [facility] CATH INVASIVE LOCATION;  Service: Cardiology   •  SECTION  ,    • CHOLECYSTECTOMY      + stones   • ENDOSCOPY     • ENDOSCOPY N/A 10/15/2019    Procedure: ESOPHAGOGASTRODUODENOSCOPY WITH BIOPSY AND ESOPHAGEAL DILATATION;  Surgeon: Ambrocio Solorzano MD;  Location: [facility] ENDOSCOPY;  Service: Gastroenterology   • HYSTERECTOMY     • SHOULDER ACROMIOPLASTY     • UPPER GASTROINTESTINAL ENDOSCOPY  10/15/2019     Family History   Problem Relation Age of Onset   • Heart disease Mother    • Heart disease Father    • Heart disease Brother    • Cirrhosis Neg Hx    • Liver cancer Neg Hx    • Liver disease Neg Hx    • Colon cancer Neg Hx    • Crohn's disease Neg Hx    • Ulcerative colitis Neg Hx    • Esophageal cancer Neg Hx    • Stomach cancer Neg Hx      Social History     Socioeconomic History   • Marital status:    Tobacco Use   • Smoking status: Current Every Day Smoker     Packs/day: 1.50     Types: Cigarettes     Start date:    • Smokeless tobacco: Never Used   Vaping Use   • Vaping Use: Never used   Substance and Sexual Activity   • Alcohol use: No     Comment: \"not in 30 years\"   • Drug use: No   • Sexual activity: Defer       Current Outpatient Medications:   •  ALBUTEROL SULFATE IN, Inhale. As needed every 4-6 hours, Disp: , Rfl:   •  aspirin 81 MG EC tablet, Take 1 tablet by mouth Daily., Disp: 90 tablet, Rfl: 2  •  doxycycline (VIBRAMYCIN) 100 MG capsule, Take 1 capsule by mouth 2 (Two) Times a Day., Disp: 20 capsule, Rfl: 0  •  ipratropium-albuterol (DUO-NEB) 0.5-2.5 " mg/3 ml nebulizer, Take 3 mL by nebulization 4 (Four) Times a Day As Needed for Wheezing., Disp: 120 mL, Rfl: 5  •  isosorbide mononitrate (IMDUR) 120 MG 24 hr tablet, TAKE 1 TABLET EVERY MORNING, Disp: 90 tablet, Rfl: 3  •  metoprolol succinate XL (TOPROL-XL) 25 MG 24 hr tablet, Take 1 tablet by mouth Daily., Disp: 30 tablet, Rfl: 3  •  Multiple Vitamins-Minerals (ALIVE WOMENS ENERGY PO), Take 1 tablet by mouth Daily., Disp: , Rfl:   •  Naproxen Sodium (ALEVE PO), Take  by mouth Daily., Disp: , Rfl:   •  nitroglycerin (NITROSTAT) 0.4 MG SL tablet, Place 0.4 mg under the tongue Every 5 (Five) Minutes As Needed for Chest Pain. Take no more than 3 doses in 15 minutes., Disp: , Rfl:   •  ondansetron ODT (ZOFRAN-ODT) 4 MG disintegrating tablet, Place 1 tablet on the tongue Every 8 (Eight) Hours As Needed for Nausea or Vomiting for up to 15 days., Disp: 15 tablet, Rfl: 0  •  predniSONE (DELTASONE) 20 MG tablet, Take 2 tablets by mouth Daily., Disp: 10 tablet, Rfl: 0  •  umeclidinium-vilanterol (ANORO ELLIPTA) 62.5-25 MCG/INH aerosol powder  inhaler, Inhale 1 puff Daily., Disp: 60 each, Rfl: 6  •  Vitamins C E (VITAMIN C & E COMBINATION PO), Take  by mouth Daily., Disp: , Rfl:   •  pantoprazole (PROTONIX) 20 MG EC tablet, Take 1 tablet by mouth 2 (Two) Times a Day., Disp: 180 tablet, Rfl: 1     Allergies   Allergen Reactions   • Penicillins Anaphylaxis   • Coconut Swelling and Rash     Tongue swells   • Morphine Swelling     The following portions of the patient's history were reviewed and updated as appropriate: allergies, current medications, past family history, past medical history, past social history, past surgical history and problem list.  Objective     Physical Exam  Vitals and nursing note reviewed.   Constitutional:       General: She is not in acute distress.     Appearance: Normal appearance. She is well-developed.   HENT:      Head: Normocephalic and atraumatic.      Right Ear: Hearing normal.      Left Ear:  "Hearing normal.      Mouth/Throat:      Mouth: Mucous membranes are not pale, not dry and not cyanotic.   Eyes:      General: Lids are normal.      Conjunctiva/sclera: Conjunctivae normal.   Neck:      Trachea: Trachea normal.   Cardiovascular:      Rate and Rhythm: Normal rate and regular rhythm.      Heart sounds: Normal heart sounds.   Pulmonary:      Effort: Pulmonary effort is normal. No respiratory distress.      Breath sounds: Normal breath sounds.   Abdominal:      General: Bowel sounds are normal.      Palpations: Abdomen is soft. There is no mass.      Tenderness: There is no abdominal tenderness.      Hernia: No hernia is present.   Skin:     General: Skin is warm and dry.   Neurological:      Mental Status: She is alert and oriented to person, place, and time.   Psychiatric:         Mood and Affect: Mood normal.         Speech: Speech normal.         Behavior: Behavior normal. Behavior is cooperative.       Vitals:    11/24/21 1319   BP: 144/70   Pulse: 65   Resp: 18   Temp: 98.5 °F (36.9 °C)   Weight: 78.5 kg (173 lb)   Height: 165.1 cm (65\")     Body mass index is 28.79 kg/m².     Results Review:   I reviewed the patient's new clinical results.    Admission on 11/13/2021, Discharged on 11/14/2021   Component Date Value Ref Range Status   • Glucose 11/13/2021 139* 65 - 99 mg/dL Final   • BUN 11/13/2021 11  8 - 23 mg/dL Final   • Creatinine 11/13/2021 0.77  0.57 - 1.00 mg/dL Final   • Sodium 11/13/2021 134* 136 - 145 mmol/L Final   • Potassium 11/13/2021 3.9  3.5 - 5.2 mmol/L Final   • Chloride 11/13/2021 96* 98 - 107 mmol/L Final   • CO2 11/13/2021 25.9  22.0 - 29.0 mmol/L Final   • Calcium 11/13/2021 9.3  8.6 - 10.5 mg/dL Final   • Total Protein 11/13/2021 6.8  6.0 - 8.5 g/dL Final   • Albumin 11/13/2021 4.14  3.50 - 5.20 g/dL Final   • ALT (SGPT) 11/13/2021 14  1 - 33 U/L Final   • AST (SGOT) 11/13/2021 19  1 - 32 U/L Final   • Alkaline Phosphatase 11/13/2021 92  39 - 117 U/L Final   • Total Bilirubin " 11/13/2021 0.6  0.0 - 1.2 mg/dL Final   • eGFR Non  Amer 11/13/2021 75  >60 mL/min/1.73 Final   • Globulin 11/13/2021 2.7  gm/dL Final   • A/G Ratio 11/13/2021 1.6  g/dL Final   • BUN/Creatinine Ratio 11/13/2021 14.3  7.0 - 25.0 Final   • Anion Gap 11/13/2021 12.1  5.0 - 15.0 mmol/L Final   • Protime 11/13/2021 13.5  12.8 - 14.5 Seconds Final   • INR 11/13/2021 0.99  0.90 - 1.10 Final   • PTT 11/13/2021 25.2* 25.5 - 35.4 seconds Final   • Lipase 11/13/2021 16  13 - 60 U/L Final   • Color, UA 11/14/2021 Yellow  Yellow, Straw Final   • Appearance, UA 11/14/2021 Clear  Clear Final   • pH, UA 11/14/2021 <=5.0  5.0 - 8.0 Final   • Specific Gravity, UA 11/14/2021 >1.030* 1.005 - 1.030 Final   • Glucose, UA 11/14/2021 Negative  Negative Final   • Ketones, UA 11/14/2021 15 mg/dL (1+)* Negative Final   • Bilirubin, UA 11/14/2021 Negative  Negative Final   • Blood, UA 11/14/2021 Trace* Negative Final   • Protein, UA 11/14/2021 Negative  Negative Final   • Leuk Esterase, UA 11/14/2021 Small (1+)* Negative Final   • Nitrite, UA 11/14/2021 Negative  Negative Final   • Urobilinogen, UA 11/14/2021 1.0 E.U./dL  0.2 - 1.0 E.U./dL Final   • C-Reactive Protein 11/13/2021 4.30* 0.00 - 0.50 mg/dL Final   • proBNP 11/13/2021 318.0  0.0 - 900.0 pg/mL Final   • Troponin T 11/13/2021 <0.010  0.000 - 0.030 ng/mL Final   • Troponin T 11/13/2021 <0.010  0.000 - 0.030 ng/mL Final   • Magnesium 11/13/2021 1.5* 1.6 - 2.4 mg/dL Final   • QT Interval 11/13/2021 400  ms Final   • QTC Interval 11/13/2021 406  ms Final   • COVID19 11/13/2021 Not Detected  Not Detected - Ref. Range Final   • Influenza A PCR 11/13/2021 Not Detected  Not Detected Final   • Influenza B PCR 11/13/2021 Not Detected  Not Detected Final   • WBC 11/13/2021 14.69* 3.40 - 10.80 10*3/mm3 Final   • RBC 11/13/2021 4.46  3.77 - 5.28 10*6/mm3 Final   • Hemoglobin 11/13/2021 14.0  12.0 - 15.9 g/dL Final   • Hematocrit 11/13/2021 41.6  34.0 - 46.6 % Final   • MCV 11/13/2021 93.3   79.0 - 97.0 fL Final   • MCH 11/13/2021 31.4  26.6 - 33.0 pg Final   • MCHC 11/13/2021 33.7  31.5 - 35.7 g/dL Final   • RDW 11/13/2021 12.3  12.3 - 15.4 % Final   • RDW-SD 11/13/2021 42.0  37.0 - 54.0 fl Final   • MPV 11/13/2021 11.7  6.0 - 12.0 fL Final   • Platelets 11/13/2021 178  140 - 450 10*3/mm3 Final   • Neutrophil % 11/13/2021 80.0* 42.7 - 76.0 % Final   • Lymphocyte % 11/13/2021 12.0* 19.6 - 45.3 % Final   • Monocyte % 11/13/2021 6.8  5.0 - 12.0 % Final   • Eosinophil % 11/13/2021 0.6  0.3 - 6.2 % Final   • Basophil % 11/13/2021 0.3  0.0 - 1.5 % Final   • Immature Grans % 11/13/2021 0.3  0.0 - 0.5 % Final   • Neutrophils, Absolute 11/13/2021 11.74* 1.70 - 7.00 10*3/mm3 Final   • Lymphocytes, Absolute 11/13/2021 1.77  0.70 - 3.10 10*3/mm3 Final   • Monocytes, Absolute 11/13/2021 1.00* 0.10 - 0.90 10*3/mm3 Final   • Eosinophils, Absolute 11/13/2021 0.09  0.00 - 0.40 10*3/mm3 Final   • Basophils, Absolute 11/13/2021 0.04  0.00 - 0.20 10*3/mm3 Final   • Immature Grans, Absolute 11/13/2021 0.05  0.00 - 0.05 10*3/mm3 Final   • nRBC 11/13/2021 0.0  0.0 - 0.2 /100 WBC Final   • Lactate 11/13/2021 1.0  0.5 - 2.0 mmol/L Final   • RBC, UA 11/14/2021 3-5* None Seen, 0-2 /HPF Final   • WBC, UA 11/14/2021 3-5* None Seen, 0-2 /HPF Final   • Bacteria, UA 11/14/2021 Trace* None Seen /HPF Final   • Squamous Epithelial Cells, UA 11/14/2021 3-6* None Seen, 0-2 /HPF Final   • Hyaline Casts, UA 11/14/2021 None Seen  None Seen /LPF Final   • Methodology 11/14/2021 Manual Light Microscopy   Final   Hospital Outpatient Visit on 10/12/2021   Component Date Value Ref Range Status   • Target HR (85%) 10/12/2021 133  bpm Final   • Max. Pred. HR (100%) 10/12/2021 156  bpm Final   • BH CV REST NUCLEAR ISOTOPE DOSE 10/12/2021 9.3  mCi Final   • BH CV STRESS NUCLEAR ISOTOPE DOSE 10/12/2021 28.7  mCi Final   • BH CV STRESS PROTOCOL 1 10/12/2021 Pharmacologic   Final   • Stage 1 10/12/2021 1   Final   • HR Stage 1 10/12/2021 90   Final   •  BP Stage 1 10/12/2021 174/74   Final   • Duration Min Stage 1 10/12/2021 0   Final   • Duration Sec Stage 1 10/12/2021 10   Final   • Stress Dose Regadenoson Stage 1 10/12/2021 0.4   Final   • Stress Comments Stage 1 10/12/2021 10 sec bolus injection   Final   • Stage 2 10/12/2021 2   Final   • HR Stage 2 10/12/2021 62   Final   • BP Stage 2 10/12/2021 158/75   Final   • Duration Min Stage 2 10/12/2021 4   Final   • Duration Sec Stage 2 10/12/2021 0   Final   • Stress Comments Stage 2 10/12/2021 recovery   Final   • Baseline HR 10/12/2021 56  bpm Final   • Baseline BP 10/12/2021 154/76  mmHg Final   • Peak HR 10/12/2021 90  bpm Final   • Percent Max Pred HR 10/12/2021 57.69  % Final   • Percent Target HR 10/12/2021 68  % Final   • Peak BP 10/12/2021 174/74  mmHg Final   • Recovery HR 10/12/2021 62  bpm Final   • Recovery BP 10/12/2021 158/75  mmHg Final      XR Chest 1 View     Result Date: 11/13/2021  No acute cardiopulmonary findings. Signer Name: Pacheco Valadez MD  Signed: 11/13/2021 10:43 PM  [facility]  [facility] Carotid Bilateral     Result Date: 10/12/2021  1. Mild to moderate plaque both carotid systems. No occlusion. 2. No hemodynamically significant stenosis of either RIGHT or LEFT ICA. 3. Antegrade flow noted both vertebral arteries.  This report was finalized on 10/12/2021 1:00 PM by Dr. Castro Dc MD.      CT Angiogram Aorta     Result Date: 11/13/2021  1.  No evidence of aortic dissection or aortic aneurysm. Atherosclerotic changes without flow-limiting stenosis. 2.  Inflammatory changes about the pancreatic body and pancreatic tail most consistent with pancreatitis. Correlate clinically. 3.  Advanced emphysema. 4.  Nonspecific urinary bladder wall thickening. Correlate with urinalysis as cystitis cannot be excluded. 5.  Fatty infiltration of the wall of the large bowel could reflect changes associated with chronic inflammation but no acute  inflammation is seen. 6.  A few scattered pulmonary nodules measure less than 6 mm. These can be followed on CT chest in 12 months as per 2017 Fleishner Society criteria. Signer Name: FACUNDO PALACIOS MD  Signed: 11/13/2021 11:21 PM       EGD dated 10/15/2019 reveals erosive distal esophagitis.  LA class C.  Small sliding hiatal hernia less than 3 cm.  Schatzki's ring.  Subtle concentric rings involving the mid and distal esophagus.  Status post serial dilation to 18 mm.  Erythematous gastritis.  Duodenum second portion biopsy reveals benign duodenal mucosa with no diagnostic abnormality.  Antrum body and fundus biopsy reveals reactive gastropathy.  Mid and distal esophagus biopsies revealed reactive squamous mucosa.    Assessment / Plan      1. Acute pancreatitis, unspecified complication status, unspecified pancreatitis type  About 11 days ago she went to the emergency room with abdominal pain, back pain and chest pain.  She was told she had pancreatitis after CT scan.  No significant history of nausea.   Symptoms resolved after a few days and have not reoccurred.  Patient denies alcohol use.  According to the patient her cholesterol levels were checked a few months ago and were normal, she is scheduled for follow-up lipid panel in the next week or so.  No new medications.  CT angiogram of aorta with ill-defined pancreas with fat stranding adjacent to the pancreatic body and tail, concerning for pancreatitis, although lipase level normal at 16. No acute abnormality of the liver or spleen noted.  No history of elevated liver enzymes.  No anemia. CT of chest in July 2019 with stranding and inflammation surrounding the tail of the pancreas compatible with acute pancreatitis noted. Abdominal ultrasound in September 2019 unremarkable, CBD normal at 3.96 mm, visualized pancreas unremarkable.  Patient scheduled for lipid panel.   CTAP in 3 months.    2. Gastroesophageal reflux disease with esophagitis without hemorrhage  3.  Esophagitis, erosive  She has been taking pantoprazole 40 mg every morning and has to take pantoprazole 20 mg at night due to severe reflux that is not controlled.  There is no history of difficulty swallowing.  Antireflux measures.  After long discussion, she is willing to try pantoprazole 20 mg in the morning and 20 mg in the evening to decrease total daily dose of PPI due to possible side effects associated with long term PPI therapy.    - pantoprazole (PROTONIX) 20 MG EC tablet; Take 1 tablet by mouth 2 (Two) Times a Day.  Dispense: 180 tablet; Refill: 1    5/11/2021  She has had GERD for several years now.  Her last EGD was in 2019 and showed esophagitis, no Castillo's was noted.  She would like refills of her Protonix 40 mg once daily that she takes with great relief of acid reflux symptoms.  She is try discontinuing this medication in the past and was unable to stop it due to severe acid reflux symptoms.    4. Abnormal CT scan, colon  5. Encounter for screening for malignant neoplasm of colon  CT angiogram of aorta dated 11/13/2021 with fatty infiltration of the wall of the large bowel which could reflect changes associated chronic inflammation but no acute inflammation seen.  Patient denies lower abdominal pain, constipation or diarrhea.  There is no history of rectal bleeding.  She has not had a colonoscopy.  She did not do the Cologuard test after her last visit.  Colonoscopy for colon cancer screening-the patient does not want to schedule.  Advised of importance of colonoscopy due to abnormal CT of the large bowel.  Patient verbalizes understanding but does not want to schedule.  She is agreeable to do Cologuard testing, and will consider colonoscopy if positive results.    - Cologuard - Stool, Per Rectum    5/11/2021  She has never had a colonoscopy.  There is no known family history of colon cancer or colon polyps.  She declines colorectal cancer screening colonoscopy at this time.  She does agree to  have Cologuard testing.  This will be sent for her.    Patient Instructions   Antireflux measures: Avoid fried, fatty foods, alcohol, chocolate, coffee, tea,  soft drinks, peppermint and spearmint, spicy foods, tomatoes and tomato based foods, onions, peppers, and smoking.   Other antireflux measures include weight reduction if overweight, avoiding tight clothing around the abdomen, elevating the head of the bed 6 inches with blocks under the head board, and don't drink or eat before going to bed and avoid lying down immediately after meals.  Pantoprazole 20 mg in the morning before breakfast and 20 mg at bedtime.  Obtain labs/lipid panel as ordered by cardiology.   Patient declines colonoscopy at this time. She is agreeable to order Cologuard.   Follow up: 3 months or sooner if needed    Lori Linares, APRN  11/24/2021    Please note that portions of this note may have been completed with a voice recognition program. Efforts were made to edit the dictations, but occasionally words are mistranscribed.

## 2021-11-24 NOTE — PATIENT INSTRUCTIONS
Antireflux measures: Avoid fried, fatty foods, alcohol, chocolate, coffee, tea,  soft drinks, peppermint and spearmint, spicy foods, tomatoes and tomato based foods, onions, peppers, and smoking.   Other antireflux measures include weight reduction if overweight, avoiding tight clothing around the abdomen, elevating the head of the bed 6 inches with blocks under the head board, and don't drink or eat before going to bed and avoid lying down immediately after meals.  Pantoprazole 20 mg in the morning before breakfast and 20 mg at bedtime.  Obtain labs/lipid panel as ordered by cardiology.   Patient declines colonoscopy at this time. She is agreeable to order Cologuard.   Follow up: 3 months or sooner if needed

## 2022-01-28 DIAGNOSIS — K21.9 GASTROESOPHAGEAL REFLUX DISEASE, UNSPECIFIED WHETHER ESOPHAGITIS PRESENT: ICD-10-CM

## 2022-01-28 RX ORDER — PANTOPRAZOLE SODIUM 40 MG/1
TABLET, DELAYED RELEASE ORAL
Qty: 30 TABLET | Refills: 1 | Status: SHIPPED | OUTPATIENT
Start: 2022-01-28 | End: 2022-02-24 | Stop reason: DRUGHIGH

## 2022-02-24 ENCOUNTER — OFFICE VISIT (OUTPATIENT)
Dept: GASTROENTEROLOGY | Facility: CLINIC | Age: 67
End: 2022-02-24

## 2022-02-24 VITALS
SYSTOLIC BLOOD PRESSURE: 117 MMHG | HEIGHT: 65 IN | TEMPERATURE: 98.4 F | HEART RATE: 63 BPM | BODY MASS INDEX: 27.32 KG/M2 | WEIGHT: 164 LBS | DIASTOLIC BLOOD PRESSURE: 55 MMHG

## 2022-02-24 DIAGNOSIS — K22.10 ESOPHAGITIS, EROSIVE: ICD-10-CM

## 2022-02-24 DIAGNOSIS — Z87.19 HISTORY OF ACUTE PANCREATITIS: ICD-10-CM

## 2022-02-24 DIAGNOSIS — R93.3 ABNORMAL CT SCAN, COLON: ICD-10-CM

## 2022-02-24 DIAGNOSIS — K21.00 GASTROESOPHAGEAL REFLUX DISEASE WITH ESOPHAGITIS WITHOUT HEMORRHAGE: Primary | ICD-10-CM

## 2022-02-24 DIAGNOSIS — Z12.11 ENCOUNTER FOR SCREENING FOR MALIGNANT NEOPLASM OF COLON: ICD-10-CM

## 2022-02-24 PROCEDURE — 99214 OFFICE O/P EST MOD 30 MIN: CPT | Performed by: NURSE PRACTITIONER

## 2022-02-24 RX ORDER — BISACODYL 5 MG/1
TABLET, DELAYED RELEASE ORAL
Qty: 4 TABLET | Refills: 0 | Status: SHIPPED | OUTPATIENT
Start: 2022-02-24 | End: 2022-03-02 | Stop reason: HOSPADM

## 2022-02-24 RX ORDER — SODIUM CHLORIDE 9 MG/ML
70 INJECTION, SOLUTION INTRAVENOUS CONTINUOUS PRN
Status: CANCELLED | OUTPATIENT
Start: 2022-02-24

## 2022-02-24 RX ORDER — POLYETHYLENE GLYCOL 3350 17 G/17G
POWDER, FOR SOLUTION ORAL
Qty: 238 G | Refills: 0 | Status: SHIPPED | OUTPATIENT
Start: 2022-02-24 | End: 2022-03-02 | Stop reason: HOSPADM

## 2022-02-24 RX ORDER — PANTOPRAZOLE SODIUM 20 MG/1
20 TABLET, DELAYED RELEASE ORAL 2 TIMES DAILY
COMMUNITY
End: 2022-04-26 | Stop reason: SDUPTHER

## 2022-02-24 NOTE — PATIENT INSTRUCTIONS
Antireflux measures: Avoid fried, fatty foods, alcohol, chocolate, coffee, tea,  soft drinks, peppermint and spearmint, spicy foods, tomatoes and tomato based foods, onions, peppers, and smoking.   Other antireflux measures include weight reduction if overweight, avoiding tight clothing around the abdomen, elevating the head of the bed 6 inches with blocks under the head board, and don't drink or eat before going to bed and avoid lying down immediately after meals.   Pantoprazole 40 mg 1 by mouth in the am 30 minutes before breakfast.   Labs  CT abdomen and pelvis   High fiber, low fat diet with liberal water intake.   Advised to exercise 30 minutes 4-5 days per week.   Advised to lose 15 pounds in the next 6-12 months.  CT abdomen and pelvis   Upper endoscopy-EGD: The indications, technique, alternatives and potential risk and complications were discussed with the patient including but not limited to bleeding, perforations, missing lesions and anesthetic complications. The patient understands and wishes to proceed with the procedure and has given their verbal consent. Written patient education information was given to the patient.   COVID-19 testing prior to procedure. The patient will need to self-quarantine after testing until the procedure. Instructions given to patient.   Colonoscopy: The indications, technique, alternatives and potential risk and complications were discussed with the patient including but not limited to bleeding, perforations, missing lesions and anesthetic complications. The patient understands and wishes to proceed with the procedure and has given their verbal consent. Written patient education information was given to the patient.   The patient will call if they have further questions before procedure.

## 2022-02-24 NOTE — PROGRESS NOTES
"     Follow Up Note     Date: 2022   Patient Name: Itzel Hagen  MRN: [id number]  : 1955     Primary Care Provider: Provider, No Known     Chief Complaint   Patient presents with   • Follow-up   • Heartburn     History of present illness:   2022  Itzel Hagen is a 66 y.o. female who is here today for follow up for heartburn.     She has been feeling better. She has not had any abdominal pain or nausea. Reflux is well controlled with Pantoprazole 20 mg in the morning and 20 mg in the evening. She did not the Cologuard kit in the mail. She does not want to schedule colonoscopy.    Interval History:  2021  She went to the ER about 11 days ago with abdominal pain, back pain and chest pain. She was told she had pancreatitis. She was given a \"pain shot\" and a few days later the pain was gone and has not returned. She had nausea after the pain shot, but no other nausea. No history of rectal bleeding or melena. She denies alcohol use. She states she had her cholesterol levels checked a few months ago and was told levels were ok. She is scheduled for follow up labs with lipid panel in the next week or so. No new medications.      She has been taking Pantoprazole 40 mg every morning and has to take a Pantoprazole 20 mg at night due to severe reflux that is not controlled.  Denies difficulty swallowing.      No history of constipation or diarrhea.      2021  Today, she reports that dysphagia is intermittent and mild.  She would like a refill on her Protonix 40 mg once daily that she takes for GERD.  In the past, she is taken Protonix 20 mg without relief and tried discontinuing this medication altogether and had very severe acid reflux symptoms and could not go without it.  She is in a hurry today due to caring for her elderly mother and father.  After her last GI visit in , she was supposed to have colonoscopy for colon cancer screening completed but she did not.  She does not want " to have this arranged at this time.  Denies any current rectal bleeding, change in bowel habits or abdominal pain.     6/1/2020  There is a long standing history of heartburn. Heartburn is severe. Reflux is worse at night. The patient is taking Pantoprazole once a day and and Pepcid twice a day with moderate control of heartburn.      The patient denies recent change in bowel habits. There is no diarrhea or constipation. There is no history of abdominal pain. There is no history of overt GI bleed (hematemesis melena or hematochezia). The patient denies nausea or vomiting. The patient denies dysphagia or odynophagia. There is no history of recent significant weight loss. There is no history of liver disease in the past. There is no family history of colon cancer. The patient has not had a colonoscopy in the past.     She complains of coughing and heartburn. She reports no abdominal pain, no chest pain or no nausea. This is a chronic problem. Episode onset: over 5 years ago. The problem occurs occasionally. The problem has been gradually improving. The heartburn duration is an hour. The heartburn is located in the substernum. The heartburn is of severe intensity. The heartburn wakes her from sleep. Nothing aggravates the symptoms. Pertinent negatives include no fatigue. There are no known risk factors. She has tried a histamine-2 antagonist and a PPI for the symptoms. The treatment provided moderate relief. Past procedures include an abdominal ultrasound (2019) and an EGD (10/15/2019).     Subjective      Past Medical History:   Diagnosis Date   • Acute pancreatitis    • Angina, class III ([facility])    • Back pain    • Bilateral cataracts    • Chest pain    • Chronic bronchitis ([facility])    • Cigarette nicotine dependence    • COPD (chronic obstructive pulmonary disease) ([facility])    • Current smoker    • Essential hypertension 8/3/2021   • Full dentures    • GERD (gastroesophageal reflux disease)    • H/O cardiac catheterization  "2019    Dr. Izquierdo, Conclusion: 1.Overall mild coronary artery disease with right dominant system.   • Heart palpitations    • History of nuclear stress test 2019    ·Left ventricular ejection fraction is hyperdynamic (Calculated EF > 70%). Impressions are consistent with a low risk study.   • Hyperlipidemia    • PONV (postoperative nausea and vomiting)    • Precordial chest pain    • SOB (shortness of breath)      Past Surgical History:   Procedure Laterality Date   • APPENDECTOMY     • BACK SURGERY      Removed bone spurs/cleaned up the area   • CARDIAC CATHETERIZATION N/A 2019    Procedure: Left Heart Cath;  Surgeon: Dre Izquierdo MD;  Location: [facility] CATH INVASIVE LOCATION;  Service: Cardiology   •  SECTION  ,    • CHOLECYSTECTOMY      + stones   • ENDOSCOPY     • ENDOSCOPY N/A 10/15/2019    Procedure: ESOPHAGOGASTRODUODENOSCOPY WITH BIOPSY AND ESOPHAGEAL DILATATION;  Surgeon: Ambrocio Solorzano MD;  Location: [facility] ENDOSCOPY;  Service: Gastroenterology   • HYSTERECTOMY     • SHOULDER ACROMIOPLASTY     • UPPER GASTROINTESTINAL ENDOSCOPY  10/15/2019     Family History   Problem Relation Age of Onset   • Heart disease Mother    • Heart disease Father    • Heart disease Brother    • Cirrhosis Neg Hx    • Liver cancer Neg Hx    • Liver disease Neg Hx    • Colon cancer Neg Hx    • Crohn's disease Neg Hx    • Ulcerative colitis Neg Hx    • Esophageal cancer Neg Hx    • Stomach cancer Neg Hx      Social History     Socioeconomic History   • Marital status:    Tobacco Use   • Smoking status: Current Every Day Smoker     Packs/day: 1.50     Types: Cigarettes     Start date:    • Smokeless tobacco: Never Used   Vaping Use   • Vaping Use: Never used   Substance and Sexual Activity   • Alcohol use: No     Comment: \"not in 30 years\"   • Drug use: No   • Sexual activity: Defer       Current Outpatient Medications:   •  ALBUTEROL SULFATE IN, Inhale. As needed every " 4-6 hours, Disp: , Rfl:   •  aspirin 81 MG EC tablet, Take 1 tablet by mouth Daily., Disp: 90 tablet, Rfl: 2  •  ipratropium-albuterol (DUO-NEB) 0.5-2.5 mg/3 ml nebulizer, Take 3 mL by nebulization 4 (Four) Times a Day As Needed for Wheezing., Disp: 120 mL, Rfl: 5  •  isosorbide mononitrate (IMDUR) 120 MG 24 hr tablet, TAKE 1 TABLET EVERY MORNING, Disp: 90 tablet, Rfl: 3  •  Multiple Vitamins-Minerals (ALIVE WOMENS ENERGY PO), Take 1 tablet by mouth Daily., Disp: , Rfl:   •  Naproxen Sodium (ALEVE PO), Take  by mouth Daily., Disp: , Rfl:   •  nitroglycerin (NITROSTAT) 0.4 MG SL tablet, Place 0.4 mg under the tongue Every 5 (Five) Minutes As Needed for Chest Pain. Take no more than 3 doses in 15 minutes., Disp: , Rfl:   •  pantoprazole (PROTONIX) 20 MG EC tablet, Take 20 mg by mouth 2 (Two) Times a Day., Disp: , Rfl:   •  umeclidinium-vilanterol (ANORO ELLIPTA) 62.5-25 MCG/INH aerosol powder  inhaler, Inhale 1 puff Daily., Disp: 60 each, Rfl: 6  •  Vitamins C E (VITAMIN C & E COMBINATION PO), Take  by mouth Daily., Disp: , Rfl:   •  bisacodyl (DULCOLAX) 5 MG EC tablet, Take as directed for colon prep, Disp: 4 tablet, Rfl: 0  •  polyethylene glycol (MiraLax) 17 GM/SCOOP powder, Take as directed for colonoscopy prep, Disp: 238 g, Rfl: 0     Allergies   Allergen Reactions   • Penicillins Anaphylaxis   • Coconut Swelling and Rash     Tongue swells   • Morphine Swelling     The following portions of the patient's history were reviewed and updated as appropriate: allergies, current medications, past family history, past medical history, past social history, past surgical history and problem list.  Objective     Physical Exam  Vitals and nursing note reviewed.   Constitutional:       General: She is not in acute distress.     Appearance: Normal appearance. She is well-developed.   HENT:      Head: Normocephalic and atraumatic.      Mouth/Throat:      Mouth: Mucous membranes are not pale, not dry and not cyanotic.   Eyes:     "  General: Lids are normal.   Neck:      Trachea: Trachea normal.   Cardiovascular:      Rate and Rhythm: Normal rate.   Pulmonary:      Effort: Pulmonary effort is normal. No respiratory distress.      Breath sounds: Normal breath sounds.   Abdominal:      General: Bowel sounds are normal.      Palpations: Abdomen is soft. There is no mass.      Tenderness: There is no abdominal tenderness.      Hernia: No hernia is present.   Skin:     General: Skin is warm and dry.   Neurological:      Mental Status: She is alert and oriented to person, place, and time.   Psychiatric:         Mood and Affect: Mood normal.         Speech: Speech normal.         Behavior: Behavior normal. Behavior is cooperative.       Vitals:    02/24/22 1610   BP: 117/55   Pulse: 63   Temp: 98.4 °F (36.9 °C)   TempSrc: Infrared   Weight: 74.4 kg (164 lb)   Height: 165.1 cm (65\")     Body mass index is 27.29 kg/m².     Results Review:   I reviewed the patient's new clinical results.    No visits with results within 90 Day(s) from this visit.   Latest known visit with results is:   Admission on 11/13/2021, Discharged on 11/14/2021   Component Date Value Ref Range Status   • Glucose 11/13/2021 139* 65 - 99 mg/dL Final   • BUN 11/13/2021 11  8 - 23 mg/dL Final   • Creatinine 11/13/2021 0.77  0.57 - 1.00 mg/dL Final   • Sodium 11/13/2021 134* 136 - 145 mmol/L Final   • Potassium 11/13/2021 3.9  3.5 - 5.2 mmol/L Final   • Chloride 11/13/2021 96* 98 - 107 mmol/L Final   • CO2 11/13/2021 25.9  22.0 - 29.0 mmol/L Final   • Calcium 11/13/2021 9.3  8.6 - 10.5 mg/dL Final   • Total Protein 11/13/2021 6.8  6.0 - 8.5 g/dL Final   • Albumin 11/13/2021 4.14  3.50 - 5.20 g/dL Final   • ALT (SGPT) 11/13/2021 14  1 - 33 U/L Final   • AST (SGOT) 11/13/2021 19  1 - 32 U/L Final   • Alkaline Phosphatase 11/13/2021 92  39 - 117 U/L Final   • Total Bilirubin 11/13/2021 0.6  0.0 - 1.2 mg/dL Final   • eGFR Non  Amer 11/13/2021 75  >60 mL/min/1.73 Final   • Globulin " 11/13/2021 2.7  gm/dL Final   • A/G Ratio 11/13/2021 1.6  g/dL Final   • BUN/Creatinine Ratio 11/13/2021 14.3  7.0 - 25.0 Final   • Anion Gap 11/13/2021 12.1  5.0 - 15.0 mmol/L Final   • Protime 11/13/2021 13.5  12.8 - 14.5 Seconds Final   • INR 11/13/2021 0.99  0.90 - 1.10 Final   • PTT 11/13/2021 25.2* 25.5 - 35.4 seconds Final   • Lipase 11/13/2021 16  13 - 60 U/L Final   • Color, UA 11/14/2021 Yellow  Yellow, Straw Final   • Appearance, UA 11/14/2021 Clear  Clear Final   • pH, UA 11/14/2021 <=5.0  5.0 - 8.0 Final   • Specific Gravity, UA 11/14/2021 >1.030* 1.005 - 1.030 Final   • Glucose, UA 11/14/2021 Negative  Negative Final   • Ketones, UA 11/14/2021 15 mg/dL (1+)* Negative Final   • Bilirubin, UA 11/14/2021 Negative  Negative Final   • Blood, UA 11/14/2021 Trace* Negative Final   • Protein, UA 11/14/2021 Negative  Negative Final   • Leuk Esterase, UA 11/14/2021 Small (1+)* Negative Final   • Nitrite, UA 11/14/2021 Negative  Negative Final   • Urobilinogen, UA 11/14/2021 1.0 E.U./dL  0.2 - 1.0 E.U./dL Final   • C-Reactive Protein 11/13/2021 4.30* 0.00 - 0.50 mg/dL Final   • proBNP 11/13/2021 318.0  0.0 - 900.0 pg/mL Final   • Troponin T 11/13/2021 <0.010  0.000 - 0.030 ng/mL Final   • Troponin T 11/13/2021 <0.010  0.000 - 0.030 ng/mL Final   • Magnesium 11/13/2021 1.5* 1.6 - 2.4 mg/dL Final   • QT Interval 11/13/2021 400  ms Final   • QTC Interval 11/13/2021 406  ms Final   • COVID19 11/13/2021 Not Detected  Not Detected - Ref. Range Final   • Influenza A PCR 11/13/2021 Not Detected  Not Detected Final   • Influenza B PCR 11/13/2021 Not Detected  Not Detected Final   • WBC 11/13/2021 14.69* 3.40 - 10.80 10*3/mm3 Final   • RBC 11/13/2021 4.46  3.77 - 5.28 10*6/mm3 Final   • Hemoglobin 11/13/2021 14.0  12.0 - 15.9 g/dL Final   • Hematocrit 11/13/2021 41.6  34.0 - 46.6 % Final   • MCV 11/13/2021 93.3  79.0 - 97.0 fL Final   • MCH 11/13/2021 31.4  26.6 - 33.0 pg Final   • MCHC 11/13/2021 33.7  31.5 - 35.7 g/dL  Final   • RDW 11/13/2021 12.3  12.3 - 15.4 % Final   • RDW-SD 11/13/2021 42.0  37.0 - 54.0 fl Final   • MPV 11/13/2021 11.7  6.0 - 12.0 fL Final   • Platelets 11/13/2021 178  140 - 450 10*3/mm3 Final   • Neutrophil % 11/13/2021 80.0* 42.7 - 76.0 % Final   • Lymphocyte % 11/13/2021 12.0* 19.6 - 45.3 % Final   • Monocyte % 11/13/2021 6.8  5.0 - 12.0 % Final   • Eosinophil % 11/13/2021 0.6  0.3 - 6.2 % Final   • Basophil % 11/13/2021 0.3  0.0 - 1.5 % Final   • Immature Grans % 11/13/2021 0.3  0.0 - 0.5 % Final   • Neutrophils, Absolute 11/13/2021 11.74* 1.70 - 7.00 10*3/mm3 Final   • Lymphocytes, Absolute 11/13/2021 1.77  0.70 - 3.10 10*3/mm3 Final   • Monocytes, Absolute 11/13/2021 1.00* 0.10 - 0.90 10*3/mm3 Final   • Eosinophils, Absolute 11/13/2021 0.09  0.00 - 0.40 10*3/mm3 Final   • Basophils, Absolute 11/13/2021 0.04  0.00 - 0.20 10*3/mm3 Final   • Immature Grans, Absolute 11/13/2021 0.05  0.00 - 0.05 10*3/mm3 Final   • nRBC 11/13/2021 0.0  0.0 - 0.2 /100 WBC Final   • Lactate 11/13/2021 1.0  0.5 - 2.0 mmol/L Final   • RBC, UA 11/14/2021 3-5* None Seen, 0-2 /HPF Final   • WBC, UA 11/14/2021 3-5* None Seen, 0-2 /HPF Final   • Bacteria, UA 11/14/2021 Trace* None Seen /HPF Final   • Squamous Epithelial Cells, UA 11/14/2021 3-6* None Seen, 0-2 /HPF Final   • Hyaline Casts, UA 11/14/2021 None Seen  None Seen /LPF Final   • Methodology 11/14/2021 Manual Light Microscopy   Final      XR Chest 1 View     Result Date: 11/13/2021  No acute cardiopulmonary findings. Signer Name: Pacheco Valadez MD  Signed: 11/13/2021 10:43 PM  Workstation Name: [facility]  [facility] Carotid Bilateral     Result Date: 10/12/2021  1. Mild to moderate plaque both carotid systems. No occlusion. 2. No hemodynamically significant stenosis of either RIGHT or LEFT ICA. 3. Antegrade flow noted both vertebral arteries.  This report was finalized on 10/12/2021 1:00 PM by Dr. Castro Dc MD.       CT Angiogram Aorta      Result Date: 11/13/2021  1.  No evidence of aortic dissection or aortic aneurysm. Atherosclerotic changes without flow-limiting stenosis. 2.  Inflammatory changes about the pancreatic body and pancreatic tail most consistent with pancreatitis. Correlate clinically. 3.  Advanced emphysema. 4.  Nonspecific urinary bladder wall thickening. Correlate with urinalysis as cystitis cannot be excluded. 5.  Fatty infiltration of the wall of the large bowel could reflect changes associated with chronic inflammation but no acute inflammation is seen. 6.  A few scattered pulmonary nodules measure less than 6 mm. These can be followed on CT chest in 12 months as per 2017 Fleishner Society criteria. Signer Name: FACUNDO PALACIOS MD  Signed: 11/13/2021 11:21 PM       EGD dated 10/15/2019 reveals erosive distal esophagitis.  LA class C.  Small sliding hiatal hernia less than 3 cm.  Schatzki's ring.  Subtle concentric rings involving the mid and distal esophagus.  Status post serial dilation to 18 mm.  Erythematous gastritis.  Duodenum second portion biopsy reveals benign duodenal mucosa with no diagnostic abnormality.  Antrum body and fundus biopsy reveals reactive gastropathy.  Mid and distal esophagus biopsies revealed reactive squamous mucosa.    Assessment / Plan      1. Gastroesophageal reflux disease with esophagitis without hemorrhage  2. Esophagitis, erosive  She has a history of reflux for several years and is taking Pantoprazole 20 mg in the morning and 20 mg in the evening with reasonable control. Continue same for now. She has cut back from 40 mg in the morning and 20 mg at night. Denies difficulty swallowing. She had EGD in 2019 with LA class C erosive distal esophagitis.  Anti-reflux measures.  EGD to rule out Castillo's.    - Case Request; Standing  - Case Request    3. Abnormal CT scan, colon  4. Encounter for screening for malignant neoplasm of colon  They did not send her Cologuard test. Denies constipation, diarrhea  or rectal bleeding.  Colonoscopy for colon cancer screening.     - CT Abdomen Pelvis With Contrast; Future  - Case Request; Standing  - Case Request  - polyethylene glycol (MiraLax) 17 GM/SCOOP powder; Take as directed for colonoscopy prep  Dispense: 238 g; Refill: 0  - bisacodyl (DULCOLAX) 5 MG EC tablet; Take as directed for colon prep  Dispense: 4 tablet; Refill: 0    11/24/2021  CT angiogram of aorta dated 11/13/2021 with fatty infiltration of the wall of the large bowel which could reflect changes associated chronic inflammation but no acute inflammation seen.  Patient denies lower abdominal pain, constipation or diarrhea.  There is no history of rectal bleeding.  She has not had a colonoscopy.  She did not do the Cologuard test after her last visit.  Colonoscopy for colon cancer screening-the patient does not want to schedule.  Advised of importance of colonoscopy due to abnormal CT of the large bowel.  Patient verbalizes understanding but does not want to schedule.  She is agreeable to do Cologuard testing, and will consider colonoscopy if positive results.    5. History of acute pancreatitis  Patient has not had any further episodes of abdominal pain, back pain or chest pain. Denies nausea or vomiting. Denies GI bleeding.   CTAP to ensure clearance of fat stranding adjacent to the pancreatic body and tail.    - CT Abdomen Pelvis With Contrast; Future    11/24/2021  About 11 days ago she went to the emergency room with abdominal pain, back pain and chest pain.  She was told she had pancreatitis after CT scan.  No significant history of nausea.   Symptoms resolved after a few days and have not reoccurred.  Patient denies alcohol use.  According to the patient her cholesterol levels were checked a few months ago and were normal, she is scheduled for follow-up lipid panel in the next week or so.  No new medications.  CT angiogram of aorta with ill-defined pancreas with fat stranding adjacent to the pancreatic  body and tail, concerning for pancreatitis, although lipase level normal at 16. No acute abnormality of the liver or spleen noted.  No history of elevated liver enzymes.  No anemia. CT of chest in July 2019 with stranding and inflammation surrounding the tail of the pancreas compatible with acute pancreatitis noted. Abdominal ultrasound in September 2019 unremarkable, CBD normal at 3.96 mm, visualized pancreas unremarkable.    Patient Instructions   Antireflux measures: Avoid fried, fatty foods, alcohol, chocolate, coffee, tea,  soft drinks, peppermint and spearmint, spicy foods, tomatoes and tomato based foods, onions, peppers, and smoking.   Other antireflux measures include weight reduction if overweight, avoiding tight clothing around the abdomen, elevating the head of the bed 6 inches with blocks under the head board, and don't drink or eat before going to bed and avoid lying down immediately after meals.   Pantoprazole 40 mg 1 by mouth in the am 30 minutes before breakfast.   Labs  CT abdomen and pelvis   High fiber, low fat diet with liberal water intake.   Advised to exercise 30 minutes 4-5 days per week.   Advised to lose 15 pounds in the next 6-12 months.  CT abdomen and pelvis   Upper endoscopy-EGD: The indications, technique, alternatives and potential risk and complications were discussed with the patient including but not limited to bleeding, perforations, missing lesions and anesthetic complications. The patient understands and wishes to proceed with the procedure and has given their verbal consent. Written patient education information was given to the patient.   COVID-19 testing prior to procedure. The patient will need to self-quarantine after testing until the procedure. Instructions given to patient.   Colonoscopy: The indications, technique, alternatives and potential risk and complications were discussed with the patient including but not limited to bleeding, perforations, missing lesions and  anesthetic complications. The patient understands and wishes to proceed with the procedure and has given their verbal consent. Written patient education information was given to the patient.   The patient will call if they have further questions before procedure.     Lori Linares, APRN  2/24/2022    Please note that portions of this note may have been completed with a voice recognition program. Efforts were made to edit the dictations, but occasionally words are mistranscribed.

## 2022-03-01 PROBLEM — Z12.11 ENCOUNTER FOR SCREENING FOR MALIGNANT NEOPLASM OF COLON: Status: ACTIVE | Noted: 2022-03-01

## 2022-03-01 PROBLEM — K21.00 GASTROESOPHAGEAL REFLUX DISEASE WITH ESOPHAGITIS WITHOUT HEMORRHAGE: Status: ACTIVE | Noted: 2022-03-01

## 2022-03-01 RX ORDER — MELATONIN
5000 DAILY
COMMUNITY

## 2022-03-01 RX ORDER — MULTIVIT WITH MINERALS/LUTEIN
1000 TABLET ORAL DAILY
COMMUNITY

## 2022-03-01 NOTE — PRE-PROCEDURE INSTRUCTIONS
PAT phone history completed with pt for upcoming procedure on  3/1/22 with Dr. Macias.    PAT PASS GIVEN/REVIEWED WITH PT.  VERBALIZED UNDERSTANDING OF THE FOLLOWING:  DO NOT EAT, DRINK, SMOKE, USE SMOKELESS TOBACCO OR CHEW GUM AFTER MIDNIGHT THE NIGHT BEFORE SURGERY.  THIS ALSO INCLUDES HARD CANDIES AND MINTS.    DO NOT SHAVE THE AREA TO BE OPERATED ON AT LEAST 48 HOURS PRIOR TO THE PROCEDURE.  DO NOT WEAR MAKE UP OR NAIL POLISH.  DO NOT LEAVE IN ANY PIERCING OR WEAR JEWELRY THE DAY OF SURGERY.      DO NOT USE ADHESIVES IF YOU WEAR DENTURES.    DO NOT WEAR EYE CONTACTS; BRING IN YOUR GLASSES.    ONLY TAKE MEDICATION THE MORNING OF YOUR PROCEDURE IF INSTRUCTED BY YOUR SURGEON WITH ENOUGH WATER TO SWALLOW THE MEDICATION.  IF YOUR SURGEON DID NOT SPECIFY WHICH MEDICATIONS TO TAKE, YOU WILL NEED TO CALL THEIR OFFICE FOR FURTHER INSTRUCTIONS AND DO AS THEY INSTRUCT.    LEAVE ANYTHING YOU CONSIDER VALUABLE AT HOME.    YOU WILL NEED TO ARRANGE FOR SOMEONE TO DRIVE YOU HOME AFTER SURGERY.  IT IS RECOMMENDED THAT YOU DO NOT DRIVE, WORK, DRINK ALCOHOL OR MAKE MAJOR DECISIONS FOR AT LEAST 24 HOURS AFTER YOUR PROCEDURE IS COMPLETE.      THE DAY OF YOUR PROCEDURE, BRING IN THE FOLLOWING IF APPLICABLE:   PICTURE ID AND INSURANCE/MEDICARE OR MEDICAID CARDS/ANY CO-PAY THAT MAY BE DUE   COPY OF ADVANCED DIRECTIVE/LIVING WILL/POWER OR    CPAP/BIPAP/INHALERS   SKIN PREP SHEET   YOUR PREADMISSION TESTING PASS (IF NOT A PHONE HISTORY)           COVID self-quarantine instructions reviewed with the pt.  Verbalized understanding.

## 2022-03-02 ENCOUNTER — HOSPITAL ENCOUNTER (OUTPATIENT)
Facility: HOSPITAL | Age: 67
Setting detail: HOSPITAL OUTPATIENT SURGERY
Discharge: HOME | End: 2022-03-02
Attending: INTERNAL MEDICINE | Admitting: INTERNAL MEDICINE

## 2022-03-02 ENCOUNTER — ANESTHESIA (OUTPATIENT)
Dept: GASTROENTEROLOGY | Facility: HOSPITAL | Age: 67
End: 2022-03-02

## 2022-03-02 ENCOUNTER — ANESTHESIA EVENT (OUTPATIENT)
Dept: GASTROENTEROLOGY | Facility: HOSPITAL | Age: 67
End: 2022-03-02

## 2022-03-02 VITALS
OXYGEN SATURATION: 96 % | HEIGHT: 65 IN | TEMPERATURE: 97 F | HEART RATE: 65 BPM | BODY MASS INDEX: 27.16 KG/M2 | RESPIRATION RATE: 24 BRPM | SYSTOLIC BLOOD PRESSURE: 101 MMHG | DIASTOLIC BLOOD PRESSURE: 69 MMHG | WEIGHT: 163 LBS

## 2022-03-02 DIAGNOSIS — K22.70 BARRETT ESOPHAGUS: ICD-10-CM

## 2022-03-02 DIAGNOSIS — Z12.11 ENCOUNTER FOR SCREENING FOR MALIGNANT NEOPLASM OF COLON: ICD-10-CM

## 2022-03-02 DIAGNOSIS — K21.00 GASTROESOPHAGEAL REFLUX DISEASE WITH ESOPHAGITIS WITHOUT HEMORRHAGE: ICD-10-CM

## 2022-03-02 DIAGNOSIS — K22.10 ESOPHAGITIS, EROSIVE: ICD-10-CM

## 2022-03-02 DIAGNOSIS — A04.8 H. PYLORI INFECTION: ICD-10-CM

## 2022-03-02 PROCEDURE — 88305 TISSUE EXAM BY PATHOLOGIST: CPT | Performed by: INTERNAL MEDICINE

## 2022-03-02 PROCEDURE — 45380 COLONOSCOPY AND BIOPSY: CPT | Performed by: INTERNAL MEDICINE

## 2022-03-02 PROCEDURE — 43239 EGD BIOPSY SINGLE/MULTIPLE: CPT | Performed by: INTERNAL MEDICINE

## 2022-03-02 PROCEDURE — 45385 COLONOSCOPY W/LESION REMOVAL: CPT | Performed by: INTERNAL MEDICINE

## 2022-03-02 PROCEDURE — 25010000002 PROPOFOL 10 MG/ML EMULSION: Performed by: NURSE ANESTHETIST, CERTIFIED REGISTERED

## 2022-03-02 RX ORDER — PROPOFOL 10 MG/ML
VIAL (ML) INTRAVENOUS AS NEEDED
Status: DISCONTINUED | OUTPATIENT
Start: 2022-03-02 | End: 2022-03-02 | Stop reason: SURG

## 2022-03-02 RX ORDER — IPRATROPIUM BROMIDE AND ALBUTEROL SULFATE 2.5; .5 MG/3ML; MG/3ML
3 SOLUTION RESPIRATORY (INHALATION) ONCE
Status: CANCELLED | OUTPATIENT
Start: 2022-03-02 | End: 2022-03-02

## 2022-03-02 RX ORDER — SIMETHICONE 20 MG/.3ML
EMULSION ORAL AS NEEDED
Status: DISCONTINUED | OUTPATIENT
Start: 2022-03-02 | End: 2022-03-02 | Stop reason: HOSPADM

## 2022-03-02 RX ORDER — LIDOCAINE HYDROCHLORIDE 20 MG/ML
INJECTION, SOLUTION INTRAVENOUS AS NEEDED
Status: DISCONTINUED | OUTPATIENT
Start: 2022-03-02 | End: 2022-03-02 | Stop reason: SURG

## 2022-03-02 RX ORDER — SODIUM CHLORIDE 9 MG/ML
70 INJECTION, SOLUTION INTRAVENOUS CONTINUOUS PRN
Status: DISCONTINUED | OUTPATIENT
Start: 2022-03-02 | End: 2022-03-02 | Stop reason: HOSPADM

## 2022-03-02 RX ORDER — ONDANSETRON 2 MG/ML
4 INJECTION INTRAMUSCULAR; INTRAVENOUS ONCE AS NEEDED
Status: CANCELLED | OUTPATIENT
Start: 2022-03-02 | End: 2022-03-02

## 2022-03-02 RX ADMIN — SODIUM CHLORIDE 70 ML/HR: 9 INJECTION, SOLUTION INTRAVENOUS at 07:46

## 2022-03-02 RX ADMIN — GLYCOPYRROLATE 0.2 MCG: 0.2 INJECTION, SOLUTION INTRAMUSCULAR; INTRAVITREAL at 08:51

## 2022-03-02 RX ADMIN — LIDOCAINE HYDROCHLORIDE 100 MG: 20 INJECTION, SOLUTION INTRAVENOUS at 08:37

## 2022-03-02 RX ADMIN — PROPOFOL 400 MG: 10 INJECTION, EMULSION INTRAVENOUS at 09:11

## 2022-03-02 NOTE — ANESTHESIA PREPROCEDURE EVALUATION
Anesthesia Evaluation     Patient summary reviewed and Nursing notes reviewed   no history of anesthetic complications:  NPO Solid Status: > 8 hours  NPO Liquid Status: > 8 hours           Airway   Mallampati: II  TM distance: >3 FB  Neck ROM: full  no difficulty expected  Dental - normal exam     Pulmonary - normal exam   (+) a smoker Current Smoked day of surgery, COPD moderate, shortness of breath,   Cardiovascular - normal exam    ECG reviewed  Rhythm: regular  Rate: normal    (+) hypertension 2 medications or greater, angina with exertion, hyperlipidemia,       Neuro/Psych- negative ROS  GI/Hepatic/Renal/Endo    (+)  GERD, PUD,      Musculoskeletal     (+) back pain,   Abdominal    Substance History - negative use     OB/GYN negative ob/gyn ROS         Other - negative ROS       ROS/Med Hx Other: · Myocardial perfusion imaging indicates a normal myocardial perfusion study with no evidence of ischemia.  · Normal LV cavity size. Normal LV wall motion noted.  · Left ventricular ejection fraction is hyperdynamic (Calculated EF > 70%). .  · Impressions are consistent with a low risk study.                       Anesthesia Plan    ASA 3     MAC   (Pt told that intravenous sedation will be used as the primary anesthetic along with local anesthesia if necessary. Every effort will be made to make sure the patient is comfortable.     The patient was told they may or may not have recall for the procedure. It was further explained that if the MAC was not adequate that a general anesthetic with either an LMA or endotracheal tube would be required.     Will proceed with the plan of care.)  intravenous induction     Anesthetic plan, all risks, benefits, and alternatives have been provided, discussed and informed consent has been obtained with: patient.        CODE STATUS:

## 2022-03-02 NOTE — H&P
[facility]  HISTORY AND PHYSICAL    Patient Name: Itzel Hagen  : 1955  MRN: [id number]    Chief Complaint:   For screening colonoscopy and EGD    History Of Presenting Illness:    Severe GERD  History of erosive esophagitis  Colon cancer screening average risk     Past Medical History:   Diagnosis Date   • Acute pancreatitis    • Angina, class III (HCC)    • Back pain    • Bilateral cataracts    • Chest pain    • Chronic bronchitis (HCC)    • Cigarette nicotine dependence    • COPD (chronic obstructive pulmonary disease) ([facility])    • Current smoker    • Ear piercing    • Essential hypertension 8/3/2021   • Full dentures    • GERD (gastroesophageal reflux disease)    • H/O cardiac catheterization 2019    Dr. Izquierdo, Conclusion: 1.Overall mild coronary artery disease with right dominant system.   • Heart palpitations    • History of nuclear stress test 2019    ·Left ventricular ejection fraction is hyperdynamic (Calculated EF > 70%). Impressions are consistent with a low risk study.   • Hyperlipidemia    • PONV (postoperative nausea and vomiting)    • Precordial chest pain    • SOB (shortness of breath)        Past Surgical History:   Procedure Laterality Date   • APPENDECTOMY     • BACK SURGERY      Removed bone spurs/cleaned up the area   • CARDIAC CATHETERIZATION N/A 2019    Procedure: Left Heart Cath;  Surgeon: Dre Izquierdo MD;  Location: [facility] INVASIVE LOCATION;  Service: Cardiology   •  SECTION  1984   • CHOLECYSTECTOMY      + stones   • ENDOSCOPY     • ENDOSCOPY N/A 10/15/2019    Procedure: ESOPHAGOGASTRODUODENOSCOPY WITH BIOPSY AND ESOPHAGEAL DILATATION;  Surgeon: Ambrocio Solorzano MD;  Location: [facility] ENDOSCOPY;  Service: Gastroenterology   • HYSTERECTOMY     • SHOULDER ACROMIOPLASTY     • TOOTH EXTRACTION     • UPPER GASTROINTESTINAL ENDOSCOPY  10/15/2019       Social History     Socioeconomic History   • Marital status:  "   Tobacco Use   • Smoking status: Current Every Day Smoker     Packs/day: 1.00     Types: Cigarettes     Start date: 1971   • Smokeless tobacco: Never Used   Vaping Use   • Vaping Use: Never used   Substance and Sexual Activity   • Alcohol use: No     Comment: \"not in 30 years\"   • Drug use: No   • Sexual activity: Defer       Family History   Problem Relation Age of Onset   • Heart disease Mother    • Heart disease Father    • Heart disease Brother    • Cirrhosis Neg Hx    • Liver cancer Neg Hx    • Liver disease Neg Hx    • Colon cancer Neg Hx    • Crohn's disease Neg Hx    • Ulcerative colitis Neg Hx    • Esophageal cancer Neg Hx    • Stomach cancer Neg Hx        Prior to Admission Medications:  Medications Prior to Admission   Medication Sig Dispense Refill Last Dose   • ALBUTEROL SULFATE IN Inhale. As needed every 4-6 hours   2/16/2022   • ascorbic acid (VITAMIN C) 1000 MG tablet Take 1,000 mg by mouth Daily.   3/1/2022 at 0800   • aspirin 81 MG EC tablet Take 1 tablet by mouth Daily. 90 tablet 2 2/24/2022   • B Complex Vitamins (VITAMIN B COMPLEX PO) Take 2 capsules by mouth Daily.   3/1/2022 at 0800   • bisacodyl (DULCOLAX) 5 MG EC tablet Take as directed for colon prep 4 tablet 0 3/1/2022 at 1500   • cholecalciferol (Vitamin D, Cholecalciferol,) 25 MCG (1000 UT) tablet Take 5,000 Units by mouth Daily.   3/1/2022 at 0800   • isosorbide mononitrate (IMDUR) 120 MG 24 hr tablet TAKE 1 TABLET EVERY MORNING 90 tablet 3 3/1/2022 at 080   • pantoprazole (PROTONIX) 20 MG EC tablet Take 20 mg by mouth 2 (Two) Times a Day.      • polyethylene glycol (MiraLax) 17 GM/SCOOP powder Take as directed for colonoscopy prep 238 g 0 3/1/2022 at 2000   • ipratropium-albuterol (DUO-NEB) 0.5-2.5 mg/3 ml nebulizer Take 3 mL by nebulization 4 (Four) Times a Day As Needed for Wheezing. 120 mL 5 More than a month at Unknown time   • nitroglycerin (NITROSTAT) 0.4 MG SL tablet Place 0.4 mg under the tongue Every 5 (Five) Minutes " As Needed for Chest Pain. Take no more than 3 doses in 15 minutes.   More than a month at Unknown time   • umeclidinium-vilanterol (ANORO ELLIPTA) 62.5-25 MCG/INH aerosol powder  inhaler Inhale 1 puff Daily. 60 each 6 More than a month at Unknown time       Allergies:  Allergies   Allergen Reactions   • Penicillins Anaphylaxis   • Coconut Swelling and Rash     Tongue swells   • Morphine Swelling        Vitals: Temp:  [97.6 °F (36.4 °C)] 97.6 °F (36.4 °C)  Heart Rate:  [63] 63  Resp:  [16] 16  BP: (130)/(72) 130/72    Review Of Systems:  Constitutional:  Negative for chills, fever, and unexpected weight change.  Respiratory:  Negative for cough, chest tightness, shortness of breath, and wheezing.  Cardiovascular:  Negative for chest pain, palpitations, and leg swelling.  Gastrointestinal:  Negative for abdominal distention, abdominal pain, Nausea, vomiting.  Neurological:  Negative for Weakness, numbness, and headaches.     Physical Exam:    General Appearance:  Alert, cooperative, in no acute distress.   Lungs:   Clear to auscultation, respirations regular, even and                 unlabored.   Heart:  Regular rhythm and normal rate.   Abdomen:   Normal bowel sounds, no masses, no organomegaly. Soft, non-tender, non-distended   Neurologic: Alert and oriented x 3. Moves all four limbs equally       Plan: ESOPHAGOGASTRODUODENOSCOPY (N/A), COLONOSCOPY (N/A)     Antonio Macias MD  3/2/2022

## 2022-03-02 NOTE — DISCHARGE INSTRUCTIONS
Rest today  No pushing,pulling,tugging,heavy lifting, or strenuous activity   No major decision making,driving,or drinking alcoholic beverages for 24 hours due to the sedation you received  Always use good hand hygiene/washing technique  No driving on pain medication.    To assist you in voiding:  Drink plenty of fluids  Listen to running water while attempting to void.    If you are unable to urinate and you have an uncomfortable urge to void or it has been   6 hours since you were discharged, return to the Emergency Room.    .   - Discharge patient to home (ambulatory).   - Resume previous diet.   - Follow an antireflux regimen.   - Avoid smoking  - Hold ASA for 2 days   - Await pathology results.   - Return to my office in 8 weeks  - Repeat colonoscopy in 5-7 years for surveillance pending path

## 2022-03-02 NOTE — ANESTHESIA POSTPROCEDURE EVALUATION
Patient: Itzel Hagen    Procedure Summary     Date: 03/02/22 Room / Location: [facility] ENDOSCOPY 2 / [facility] ENDOSCOPY    Anesthesia Start: 0835 Anesthesia Stop: 0917    Procedures:       ESOPHAGOGASTRODUODENOSCOPY with biopsy (N/A Esophagus)      COLONOSCOPY with biopsies , polypectomy x3 (N/A Anus) Diagnosis:       Gastroesophageal reflux disease with esophagitis without hemorrhage      Esophagitis, erosive      Encounter for screening for malignant neoplasm of colon      (Gastroesophageal reflux disease with esophagitis without hemorrhage [K21.00])      (Esophagitis, erosive [K22.10])      (Encounter for screening for malignant neoplasm of colon [Z12.11])    Surgeons: Antonio Macias MD Provider: Bryan Austin CRNA    Anesthesia Type: MAC ASA Status: 3          Anesthesia Type: MAC    Vitals  Vitals Value Taken Time   /69 03/02/22 0948   Temp 97 °F (36.1 °C) 03/02/22 0921   Pulse 65 03/02/22 0948   Resp 24 03/02/22 0948   SpO2 96 % 03/02/22 0948           Post Anesthesia Care and Evaluation    Patient location during evaluation: PHASE II  Patient participation: complete - patient participated  Level of consciousness: awake  Pain score: 1  Pain management: adequate  Airway patency: patent  Anesthetic complications: No anesthetic complications  PONV Status: controlled  Cardiovascular status: acceptable and stable  Respiratory status: acceptable  Hydration status: acceptable

## 2022-03-08 LAB
LAB AP CASE REPORT: NORMAL
PATH REPORT.FINAL DX SPEC: NORMAL

## 2022-03-24 ENCOUNTER — HOSPITAL ENCOUNTER (OUTPATIENT)
Dept: CT IMAGING | Facility: HOSPITAL | Age: 67
Discharge: HOME | End: 2022-03-24

## 2022-03-24 ENCOUNTER — LAB (OUTPATIENT)
Dept: LAB | Facility: HOSPITAL | Age: 67
End: 2022-03-24

## 2022-03-24 DIAGNOSIS — I10 ESSENTIAL HYPERTENSION: ICD-10-CM

## 2022-03-24 DIAGNOSIS — R93.3 ABNORMAL CT SCAN, COLON: ICD-10-CM

## 2022-03-24 DIAGNOSIS — Z87.19 HISTORY OF ACUTE PANCREATITIS: ICD-10-CM

## 2022-03-24 PROCEDURE — 82565 ASSAY OF CREATININE: CPT

## 2022-03-24 PROCEDURE — 80061 LIPID PANEL: CPT

## 2022-03-24 PROCEDURE — 80053 COMPREHEN METABOLIC PANEL: CPT

## 2022-03-24 PROCEDURE — 74177 CT ABD & PELVIS W/CONTRAST: CPT | Performed by: RADIOLOGY

## 2022-03-24 PROCEDURE — 25010000002 IOPAMIDOL 61 % SOLUTION: Performed by: NURSE PRACTITIONER

## 2022-03-24 PROCEDURE — 36415 COLL VENOUS BLD VENIPUNCTURE: CPT

## 2022-03-24 PROCEDURE — 74177 CT ABD & PELVIS W/CONTRAST: CPT

## 2022-03-24 RX ADMIN — IOPAMIDOL 80 ML: 612 INJECTION, SOLUTION INTRAVENOUS at 14:40

## 2022-03-25 LAB
ALBUMIN SERPL-MCNC: 4.4 G/DL (ref 3.5–5.2)
ALBUMIN/GLOB SERPL: 2 G/DL
ALP SERPL-CCNC: 180 U/L (ref 39–117)
ALT SERPL W P-5'-P-CCNC: 18 U/L (ref 1–33)
ANION GAP SERPL CALCULATED.3IONS-SCNC: 11 MMOL/L (ref 5–15)
AST SERPL-CCNC: 24 U/L (ref 1–32)
BILIRUB SERPL-MCNC: 0.6 MG/DL (ref 0–1.2)
BUN SERPL-MCNC: 11 MG/DL (ref 8–23)
BUN/CREAT SERPL: 13.4 (ref 7–25)
CALCIUM SPEC-SCNC: 9.7 MG/DL (ref 8.6–10.5)
CHLORIDE SERPL-SCNC: 101 MMOL/L (ref 98–107)
CHOLEST SERPL-MCNC: 115 MG/DL (ref 0–200)
CO2 SERPL-SCNC: 28 MMOL/L (ref 22–29)
CREAT SERPL-MCNC: 0.82 MG/DL (ref 0.57–1)
EGFRCR SERPLBLD CKD-EPI 2021: 79.5 ML/MIN/1.73
GLOBULIN UR ELPH-MCNC: 2.2 GM/DL
GLUCOSE SERPL-MCNC: 108 MG/DL (ref 65–99)
HDLC SERPL-MCNC: 45 MG/DL (ref 40–60)
LDLC SERPL CALC-MCNC: 48 MG/DL (ref 0–100)
LDLC/HDLC SERPL: 1 {RATIO}
POTASSIUM SERPL-SCNC: 4 MMOL/L (ref 3.5–5.2)
PROT SERPL-MCNC: 6.6 G/DL (ref 6–8.5)
SODIUM SERPL-SCNC: 140 MMOL/L (ref 136–145)
TRIGL SERPL-MCNC: 124 MG/DL (ref 0–150)
VLDLC SERPL-MCNC: 22 MG/DL (ref 5–40)

## 2022-04-04 LAB — CREAT BLDA-MCNC: 0.8 MG/DL (ref 0.6–1.3)

## 2022-04-14 ENCOUNTER — OFFICE VISIT (OUTPATIENT)
Dept: CARDIOLOGY | Facility: CLINIC | Age: 67
End: 2022-04-14

## 2022-04-14 VITALS
TEMPERATURE: 98.4 F | WEIGHT: 159.4 LBS | HEART RATE: 62 BPM | DIASTOLIC BLOOD PRESSURE: 73 MMHG | SYSTOLIC BLOOD PRESSURE: 117 MMHG | BODY MASS INDEX: 26.56 KG/M2 | HEIGHT: 65 IN | OXYGEN SATURATION: 96 %

## 2022-04-14 DIAGNOSIS — R07.2 PRECORDIAL PAIN: ICD-10-CM

## 2022-04-14 DIAGNOSIS — I10 ESSENTIAL HYPERTENSION: Primary | ICD-10-CM

## 2022-04-14 PROCEDURE — 99214 OFFICE O/P EST MOD 30 MIN: CPT | Performed by: PHYSICIAN ASSISTANT

## 2022-04-14 RX ORDER — ISOSORBIDE MONONITRATE 120 MG/1
120 TABLET, EXTENDED RELEASE ORAL EVERY MORNING
Qty: 90 TABLET | Refills: 3 | Status: SHIPPED | OUTPATIENT
Start: 2022-04-14

## 2022-04-14 RX ORDER — METOPROLOL SUCCINATE 25 MG/1
25 TABLET, EXTENDED RELEASE ORAL DAILY
Qty: 90 TABLET | Refills: 3 | Status: SHIPPED | OUTPATIENT
Start: 2022-04-14

## 2022-04-14 NOTE — PROGRESS NOTES
Provider, No Known  Itzel Hagen  1955 04/14/2022    Patient Active Problem List   Diagnosis   • Precordial pain   • Angina, class III (HCC)   • Cigarette nicotine dependence without complication   • Family history of premature coronary artery disease   • Heartburn   • Acute pancreatitis without infection or necrosis   • Abnormal CT of the abdomen   • Chest pain   • Chronic fatigue syndrome   • Gastro-esophageal reflux disease without esophagitis   • Chronic pancreatitis (HCC)   • Esophagitis, erosive   • Gastritis, erosive   • Essential hypertension   • Gastroesophageal reflux disease with esophagitis without hemorrhage   • Encounter for screening for malignant neoplasm of colon       Dear Provider, No Known:    Subjective     History of Present Illness:    Chief Complaint   Patient presents with   • Follow-up     6MO F/U, PALPITATIONS   • Med Management     VERBALLY       Itzel Hagen is a pleasant 66 y.o. female with a past medical history significant for nonobstructive coronary artery disease with 20% stenosis in the RCA, tobacco abuse, family history of coronary artery disease with mother having CABG in a brother received stents in their 40s.  She does not have history of diabetes mellitus, however, does smoke tobacco. She comes in today for cardiology follow-up.    Since Itzel was last seen she did have EGD/colonoscopy.  Colonoscopy did reveal a couple polyps that were removed EGD revealed some chronic gastritis but appeared mild she is to follow-up with gastroenterology to discuss results soon.  Clinically she reports still some occasional left-sided neck pain but reports this has improved some she reports that this neck pain comes on once weekly and will last for a couple minutes before resolving spontaneously.  She did have blood work performed on March 24 which did show excellently controlled cholesterol with a total of 115 and LDL of 48.    Allergies   Allergen Reactions   • Penicillins  "Anaphylaxis   • Coconut Swelling and Rash     Tongue swells   • Morphine Swelling   :      Current Outpatient Medications:   •  ALBUTEROL SULFATE IN, Inhale. As needed every 4-6 hours, Disp: , Rfl:   •  ascorbic acid (VITAMIN C) 1000 MG tablet, Take 1,000 mg by mouth Daily., Disp: , Rfl:   •  aspirin 81 MG EC tablet, Take 1 tablet by mouth Daily., Disp: 90 tablet, Rfl: 2  •  B Complex Vitamins (VITAMIN B COMPLEX PO), Take 2 capsules by mouth Daily., Disp: , Rfl:   •  cholecalciferol (VITAMIN D3) 25 MCG (1000 UT) tablet, Take 5,000 Units by mouth Daily., Disp: , Rfl:   •  isosorbide mononitrate (IMDUR) 120 MG 24 hr tablet, Take 1 tablet by mouth Every Morning., Disp: 90 tablet, Rfl: 3  •  nitroglycerin (NITROSTAT) 0.4 MG SL tablet, Place 0.4 mg under the tongue Every 5 (Five) Minutes As Needed for Chest Pain. Take no more than 3 doses in 15 minutes., Disp: , Rfl:   •  pantoprazole (PROTONIX) 20 MG EC tablet, Take 20 mg by mouth 2 (Two) Times a Day., Disp: , Rfl:   •  umeclidinium-vilanterol (ANORO ELLIPTA) 62.5-25 MCG/INH aerosol powder  inhaler, Inhale 1 puff Daily., Disp: 60 each, Rfl: 6  •  ipratropium-albuterol (DUO-NEB) 0.5-2.5 mg/3 ml nebulizer, Take 3 mL by nebulization 4 (Four) Times a Day As Needed for Wheezing., Disp: 120 mL, Rfl: 5  •  metoprolol succinate XL (TOPROL-XL) 25 MG 24 hr tablet, Take 1 tablet by mouth Daily., Disp: 90 tablet, Rfl: 3    The following portions of the patient's history were reviewed and updated as appropriate: allergies, current medications, past family history, past medical history, past social history, past surgical history and problem list.    Social History     Tobacco Use   • Smoking status: Current Every Day Smoker     Packs/day: 0.50     Types: Cigarettes     Start date: 1971   • Smokeless tobacco: Never Used   Vaping Use   • Vaping Use: Never used   Substance Use Topics   • Alcohol use: No     Comment: \"not in 30 years\"   • Drug use: No         Objective   Vitals:    " "04/14/22 1414   BP: 117/73   BP Location: Right arm   Patient Position: Sitting   Pulse: 62   Temp: 98.4 °F (36.9 °C)   SpO2: 96%   Weight: 72.3 kg (159 lb 6.4 oz)   Height: 165.1 cm (65\")     Body mass index is 26.53 kg/m².    Constitutional:       General: Not in acute distress.     Appearance: Healthy appearance. Well-developed and not in distress. Not diaphoretic.   Eyes:      Conjunctiva/sclera: Conjunctivae normal.      Pupils: Pupils are equal, round, and reactive to light.   HENT:      Head: Normocephalic and atraumatic.   Neck:      Vascular: No carotid bruit or JVD.   Pulmonary:      Effort: Pulmonary effort is normal. No respiratory distress.      Breath sounds: Normal breath sounds.   Cardiovascular:      Normal rate. Regular rhythm.   Skin:     General: Skin is cool.   Neurological:      Mental Status: Alert, oriented to person, place, and time and oriented to person, place and time.         Lab Results   Component Value Date     03/24/2022    K 4.0 03/24/2022     03/24/2022    CO2 28.0 03/24/2022    BUN 11 03/24/2022    CREATININE 0.80 03/24/2022    GLUCOSE 108 (H) 03/24/2022    CALCIUM 9.7 03/24/2022    AST 24 03/24/2022    ALT 18 03/24/2022    ALKPHOS 180 (H) 03/24/2022     No results found for: CKTOTAL  Lab Results   Component Value Date    WBC 14.69 (H) 11/13/2021    HGB 14.0 11/13/2021    HCT 41.6 11/13/2021     11/13/2021     Lab Results   Component Value Date    INR 0.99 11/13/2021     Lab Results   Component Value Date    MG 1.5 (L) 11/13/2021     Lab Results   Component Value Date    TRIG 124 03/24/2022    HDL 45 03/24/2022    LDL 48 03/24/2022      No results found for: BNP    During this visit the following were done:  Labs Reviewed []    Labs Ordered []    Radiology Reports Reviewed []    Radiology Ordered []    PCP Records Reviewed []    Referring Provider Records Reviewed []    ER Records Reviewed []    Hospital Records Reviewed []    History Obtained From Family []  "   Radiology Images Reviewed []    Other Reviewed []    Records Requested []       Procedures    Assessment/Plan    Diagnosis Plan   1. Essential hypertension     2. Precordial pain  Adult Transthoracic Echo Complete W/ Cont if Necessary Per Protocol            Recommendations:  1. Precordial pain  1. Overall low suspicioun for coronary artery disease with normal stress test which I did discuss with her. I will order echocardiogram to rule out valvular disease.   2. I will continue aspirin, metoprolol, imdur, and lipitor.       No follow-ups on file.    As always, I appreciate very much the opportunity to participate in the cardiovascular care of your patients.      With Best Regards,    Sebastian Fuchs PA-C

## 2022-04-15 ENCOUNTER — TELEPHONE (OUTPATIENT)
Dept: CARDIOLOGY | Facility: CLINIC | Age: 67
End: 2022-04-15

## 2022-04-15 RX ORDER — ATORVASTATIN CALCIUM 80 MG/1
80 TABLET, FILM COATED ORAL DAILY
COMMUNITY
End: 2022-10-07

## 2022-04-15 NOTE — TELEPHONE ENCOUNTER
[facility].     Patient said the two medications she could not remember yesterday was:     Lipitor 80mg  Metoprolol succ 25mg.     Thanks!

## 2022-04-15 NOTE — TELEPHONE ENCOUNTER
Called pt to confirm what pharmacy we need to call to get her med list. No answer.       ----- Message from Sebastian Fuchs PA-C sent at 4/14/2022  3:42 PM EDT -----  Can we call and confirm her medication list with her pharmacy?

## 2022-04-26 ENCOUNTER — OFFICE VISIT (OUTPATIENT)
Dept: GASTROENTEROLOGY | Facility: CLINIC | Age: 67
End: 2022-04-26

## 2022-04-26 VITALS
SYSTOLIC BLOOD PRESSURE: 112 MMHG | WEIGHT: 162 LBS | DIASTOLIC BLOOD PRESSURE: 62 MMHG | BODY MASS INDEX: 26.99 KG/M2 | TEMPERATURE: 97.8 F | HEIGHT: 65 IN

## 2022-04-26 DIAGNOSIS — K21.00 GASTROESOPHAGEAL REFLUX DISEASE WITH ESOPHAGITIS WITHOUT HEMORRHAGE: Primary | ICD-10-CM

## 2022-04-26 DIAGNOSIS — Z87.19 HISTORY OF PANCREATITIS: ICD-10-CM

## 2022-04-26 DIAGNOSIS — D12.6 ADENOMATOUS POLYP OF COLON, UNSPECIFIED PART OF COLON: ICD-10-CM

## 2022-04-26 PROCEDURE — 99214 OFFICE O/P EST MOD 30 MIN: CPT | Performed by: NURSE PRACTITIONER

## 2022-04-26 RX ORDER — PANTOPRAZOLE SODIUM 20 MG/1
20 TABLET, DELAYED RELEASE ORAL 2 TIMES DAILY
Qty: 180 TABLET | Refills: 3 | Status: SHIPPED | OUTPATIENT
Start: 2022-04-26

## 2022-04-26 NOTE — PATIENT INSTRUCTIONS
Antireflux measures: Avoid fried, fatty foods, alcohol, chocolate, coffee, tea,  soft drinks, peppermint and spearmint, spicy foods, tomatoes and tomato based foods, onions, peppers, and smoking.   Other antireflux measures include weight reduction if overweight, avoiding tight clothing around the abdomen, elevating the head of the bed 6 inches with blocks under the head board, and don't drink or eat before going to bed and avoid lying down immediately after meals.  Avoid vaping/smoking.  Pantoprazole 20 mg 1 by mouth twice a day for reflux.   High fiber, low fat diet with liberal water intake.   Colonoscopy for surveillance in 5 years, 2027.  Follow up: 1 year or sooner if needed

## 2022-04-26 NOTE — PROGRESS NOTES
Follow Up Note     Date: 2022   Patient Name: Itzel Hagen  MRN: [id number]  : 1955     Primary Care Provider: Provider, No Known     Chief Complaint   Patient presents with   • Follow-up     EGD/Colonoscopy 3/2/2022   • Gastroesophageal reflux disease with esophagitis withour he     History of present illness:   2022  Itzel Hagen is a 66 y.o. female who is here today for follow up after procedures.     She has been doing well. Reflux is well controlled with low dose PPI twice a day. She has tried to decrease to once a day but reflux is severe. No constipation or diarrhea. Denies GI bleeding. She has follow up with pulmonology in a few weeks to discuss nodules noted on CT scan.     Interval History:  2022  Itzel Hagen is a 66 y.o. female who is here today for follow up for heartburn. She has been feeling better. She has not had any abdominal pain or nausea. Reflux is well controlled with Pantoprazole 20 mg in the morning and 20 mg in the evening. She did not the Cologuard kit in the mail. She does not want to schedule colonoscopy.     2020  There is a long standing history of heartburn. Heartburn is severe. Reflux is worse at night. The patient is taking Pantoprazole once a day and and Pepcid twice a day with moderate control of heartburn.      The patient denies recent change in bowel habits. There is no diarrhea or constipation. There is no history of abdominal pain. There is no history of overt GI bleed (hematemesis melena or hematochezia). The patient denies nausea or vomiting. The patient denies dysphagia or odynophagia. There is no history of recent significant weight loss. There is no history of liver disease in the past. There is no family history of colon cancer. The patient has not had a colonoscopy in the past.     She complains of coughing and heartburn. She reports no abdominal pain, no chest pain or no nausea. This is a chronic problem. Episode  onset: over 5 years ago. The problem occurs occasionally. The problem has been gradually improving. The heartburn duration is an hour. The heartburn is located in the substernum. The heartburn is of severe intensity. The heartburn wakes her from sleep. Nothing aggravates the symptoms. Pertinent negatives include no fatigue. There are no known risk factors. She has tried a histamine-2 antagonist and a PPI for the symptoms. The treatment provided moderate relief. Past procedures include an abdominal ultrasound () and an EGD (10/15/2019).     Subjective      Past Medical History:   Diagnosis Date   • Acute pancreatitis    • Angina, class III (HCC)    • Back pain    • Bilateral cataracts    • Chest pain    • Chronic bronchitis (HCC)    • Cigarette nicotine dependence    • COPD (chronic obstructive pulmonary disease) ([facility])    • Current smoker    • Ear piercing    • Essential hypertension 8/3/2021   • Full dentures    • GERD (gastroesophageal reflux disease)    • H/O cardiac catheterization 2019    Dr. Izquierdo, Conclusion: 1.Overall mild coronary artery disease with right dominant system.   • Heart palpitations    • History of nuclear stress test 2019    ·Left ventricular ejection fraction is hyperdynamic (Calculated EF > 70%). Impressions are consistent with a low risk study.   • Hyperlipidemia    • PONV (postoperative nausea and vomiting)    • Precordial chest pain    • SOB (shortness of breath)      Past Surgical History:   Procedure Laterality Date   • APPENDECTOMY     • BACK SURGERY      Removed bone spurs/cleaned up the area   • CARDIAC CATHETERIZATION N/A 2019    Procedure: Left Heart Cath;  Surgeon: Dre Izquierdo MD;  Location: [facility] INVASIVE LOCATION;  Service: Cardiology   •  SECTION  ,    • CHOLECYSTECTOMY      + stones   • COLONOSCOPY N/A 3/2/2022    Procedure: COLONOSCOPY with biopsies , polypectomy x3;  Surgeon: Antonio Macias MD;  Location:   "[facility] ENDOSCOPY;  Service: Gastroenterology;  Laterality: N/A;   • ENDOSCOPY     • ENDOSCOPY N/A 10/15/2019    Procedure: ESOPHAGOGASTRODUODENOSCOPY WITH BIOPSY AND ESOPHAGEAL DILATATION;  Surgeon: Ambrocio Solorzano MD;  Location: [facility] ENDOSCOPY;  Service: Gastroenterology   • ENDOSCOPY N/A 3/2/2022    Procedure: ESOPHAGOGASTRODUODENOSCOPY with biopsy;  Surgeon: Antonio Macias MD;  Location: [facility] ENDOSCOPY;  Service: Gastroenterology;  Laterality: N/A;   • HYSTERECTOMY     • SHOULDER ACROMIOPLASTY  1998   • TOOTH EXTRACTION     • UPPER GASTROINTESTINAL ENDOSCOPY  10/15/2019     Family History   Problem Relation Age of Onset   • Heart disease Mother    • Heart disease Father    • Heart disease Brother    • Cirrhosis Neg Hx    • Liver cancer Neg Hx    • Liver disease Neg Hx    • Colon cancer Neg Hx    • Crohn's disease Neg Hx    • Ulcerative colitis Neg Hx    • Esophageal cancer Neg Hx    • Stomach cancer Neg Hx      Social History     Socioeconomic History   • Marital status:    Tobacco Use   • Smoking status: Current Every Day Smoker     Packs/day: 0.50     Types: Cigarettes     Start date: 1971   • Smokeless tobacco: Never Used   Vaping Use   • Vaping Use: Never used   Substance and Sexual Activity   • Alcohol use: No     Comment: \"not in 30 years\"   • Drug use: No   • Sexual activity: Defer       Current Outpatient Medications:   •  ALBUTEROL SULFATE IN, Inhale. As needed every 4-6 hours, Disp: , Rfl:   •  ascorbic acid (VITAMIN C) 1000 MG tablet, Take 1,000 mg by mouth Daily., Disp: , Rfl:   •  aspirin 81 MG EC tablet, Take 1 tablet by mouth Daily., Disp: 90 tablet, Rfl: 2  •  atorvastatin (LIPITOR) 80 MG tablet, Take 80 mg by mouth Daily., Disp: , Rfl:   •  B Complex Vitamins (VITAMIN B COMPLEX PO), Take 2 capsules by mouth Daily., Disp: , Rfl:   •  cholecalciferol (VITAMIN D3) 25 MCG (1000 UT) tablet, Take 5,000 Units by mouth Daily., Disp: , Rfl:   •  ipratropium-albuterol (DUO-NEB) 0.5-2.5 mg/3 " ml nebulizer, Take 3 mL by nebulization 4 (Four) Times a Day As Needed for Wheezing., Disp: 120 mL, Rfl: 5  •  isosorbide mononitrate (IMDUR) 120 MG 24 hr tablet, Take 1 tablet by mouth Every Morning., Disp: 90 tablet, Rfl: 3  •  metoprolol succinate XL (TOPROL-XL) 25 MG 24 hr tablet, Take 1 tablet by mouth Daily., Disp: 90 tablet, Rfl: 3  •  nitroglycerin (NITROSTAT) 0.4 MG SL tablet, Place 0.4 mg under the tongue Every 5 (Five) Minutes As Needed for Chest Pain. Take no more than 3 doses in 15 minutes., Disp: , Rfl:   •  pantoprazole (PROTONIX) 20 MG EC tablet, Take 1 tablet by mouth 2 (Two) Times a Day., Disp: 180 tablet, Rfl: 3  •  umeclidinium-vilanterol (ANORO ELLIPTA) 62.5-25 MCG/INH aerosol powder  inhaler, Inhale 1 puff Daily., Disp: 60 each, Rfl: 6     Allergies   Allergen Reactions   • Penicillins Anaphylaxis   • Coconut Swelling and Rash     Tongue swells   • Morphine Swelling     The following portions of the patient's history were reviewed and updated as appropriate: allergies, current medications, past family history, past medical history, past social history, past surgical history and problem list.  Objective     Physical Exam  Vitals and nursing note reviewed.   Constitutional:       General: She is not in acute distress.     Appearance: Normal appearance. She is well-developed.   HENT:      Head: Normocephalic and atraumatic.      Mouth/Throat:      Mouth: Mucous membranes are not pale, not dry and not cyanotic.   Eyes:      General: Lids are normal.   Neck:      Trachea: Trachea normal.   Cardiovascular:      Rate and Rhythm: Normal rate.   Pulmonary:      Effort: Pulmonary effort is normal. No respiratory distress.      Breath sounds: Normal breath sounds.   Abdominal:      General: Bowel sounds are normal.      Palpations: Abdomen is soft. There is no mass.      Tenderness: There is no abdominal tenderness.      Hernia: No hernia is present.   Skin:     General: Skin is warm and dry.  "  Neurological:      Mental Status: She is alert and oriented to person, place, and time.   Psychiatric:         Mood and Affect: Mood normal.         Speech: Speech normal.         Behavior: Behavior normal. Behavior is cooperative.       Vitals:    04/26/22 1417   BP: 112/62   Temp: 97.8 °F (36.6 °C)   Weight: 73.5 kg (162 lb)   Height: 165.1 cm (65\")     Body mass index is 26.96 kg/m².     Results Review:   I reviewed the patient's new clinical results.    Hospital Outpatient Visit on 03/24/2022   Component Date Value Ref Range Status   • Creatinine 03/24/2022 0.80  0.60 - 1.30 mg/dL Final    Serial Number: [id number]:  [id number]   Lab on 03/24/2022   Component Date Value Ref Range Status   • Glucose 03/24/2022 108 (A) 65 - 99 mg/dL Final   • BUN 03/24/2022 11  8 - 23 mg/dL Final   • Creatinine 03/24/2022 0.82  0.57 - 1.00 mg/dL Final   • Sodium 03/24/2022 140  136 - 145 mmol/L Final   • Potassium 03/24/2022 4.0  3.5 - 5.2 mmol/L Final   • Chloride 03/24/2022 101  98 - 107 mmol/L Final   • CO2 03/24/2022 28.0  22.0 - 29.0 mmol/L Final   • Calcium 03/24/2022 9.7  8.6 - 10.5 mg/dL Final   • Total Protein 03/24/2022 6.6  6.0 - 8.5 g/dL Final   • Albumin 03/24/2022 4.40  3.50 - 5.20 g/dL Final   • ALT (SGPT) 03/24/2022 18  1 - 33 U/L Final   • AST (SGOT) 03/24/2022 24  1 - 32 U/L Final   • Alkaline Phosphatase 03/24/2022 180 (A) 39 - 117 U/L Final   • Total Bilirubin 03/24/2022 0.6  0.0 - 1.2 mg/dL Final   • Globulin 03/24/2022 2.2  gm/dL Final   • A/G Ratio 03/24/2022 2.0  g/dL Final   • BUN/Creatinine Ratio 03/24/2022 13.4  7.0 - 25.0 Final   • Anion Gap 03/24/2022 11.0  5.0 - 15.0 mmol/L Final   • eGFR 03/24/2022 79.5  >60.0 mL/min/1.73 Final    National Kidney Foundation and American Society of Nephrology (ASN) Task Force recommended calculation based on the Chronic Kidney Disease Epidemiology Collaboration (CKD-EPI) equation refit without adjustment for race.   • Total Cholesterol 03/24/2022 115  0 - 200 mg/dL " Final   • Triglycerides 03/24/2022 124  0 - 150 mg/dL Final   • HDL Cholesterol 03/24/2022 45  40 - 60 mg/dL Final   • LDL Cholesterol  03/24/2022 48  0 - 100 mg/dL Final   • VLDL Cholesterol 03/24/2022 22  5 - 40 mg/dL Final   • LDL/HDL Ratio 03/24/2022 1.00   Final   Admission on 03/02/2022, Discharged on 03/02/2022   Component Date Value Ref Range Status   • Case Report 03/02/2022    Final                    Value:Surgical Pathology Report                         Case: [id number]                                  Authorizing Provider:  Antonio Macias MD  Collected:           03/02/2022 08:39 AM          Ordering Location:     [facility]    Received:            03/02/2022 02:01 PM                                 [facility]                                                                    Pathologist:           Jorge Stuart MD                                                     Specimens:   1) - Gastric, Body                                                                                  2) - Esophagus, Distal, distal esophagus biopsy for suspected Barretts                              3) - Small Intestine, Ileum, TI rule out inflammation                                               4) - Large Intestine, random colon biopsies                                                         5) - Large Intestine, Right / Ascending Colon                                                                                 6) - Large Intestine, Rectum, rectosigmoid polyp x2                                       • Final Diagnosis 03/02/2022    Final                    Value:This result contains rich text formatting which cannot be displayed here.      CT Abdomen Pelvis With Contrast     Result Date: 3/24/2022   1. Two tiny 3.3 mm nodules in the right lung.  2.Colonic wall thickening, likely a nonspecific colitis.  3. Other findings as above.     This report was finalized on 3/24/2022 3:19 PM by   Jorge Flower MD.       EGD dated 10/15/2019 per Dr. Solorzano  - Erosive distal esophagitis.  LA class C.    - Small sliding hiatal hernia less than 3 cm.    - Schatzki's ring.  Subtle concentric rings involving the mid and distal esophagus.  Status post serial dilation to 18 mm.    - Erythematous gastritis.  Duodenum second portion biopsy reveals benign duodenal mucosa with no diagnostic abnormality.  Antrum body and fundus biopsy reveals reactive gastropathy.  Mid and distal esophagus biopsies revealed reactive squamous mucosa.    EGD dated 3/2/2022 per Dr. Macias  - Normal oropharynx.  - Z-line irregular, 35 cm from the incisors.  - Esophageal mucosal changes suspicious for Castillo's esophagus. Biopsied.  - 2 cm hiatal hernia.  - Erythematous mucosa in the antrum and prepyloric region of the stomach. Biopsied.  - Normal first portion of the duodenum, second portion of the duodenum and third portion of the duodenum.  - Erythematous duodenopathy.  -Gastric body biopsy with reactive gastropathy.  Distal esophagus biopsy with minimal chronic gastritis, no intestinal metaplasia.    Colonoscopy dated 3/2/2022 per Dr. Macias  - One 6 to 8 mm polyp in the proximal ascending colon, removed with a hot snare. Resected and retrieved.  - Two 2 to 3 mm polyps at the recto-sigmoid colon, removed with a cold snare. Resected and retrieved.  - Diverticulosis in the sigmoid colon and in the descending colon.  - The examined portion of the ileum was normal. Biopsied.  - Anal papilla(e) were hypertrophied.  - Non-bleeding external hemorrhoids.  - Biopsies were taken with a cold forceps from the right colon, left colon and transverse colon for evaluation of microscopic colitis, abnormal CTAP.  -Terminal ileum biopsy unremarkable.  Random colon biopsy unremarkable.  Ascending colon polyp and rectosigmoid colon polyp biopsies with tubular adenomas, hyperplastic polyp.    Assessment / Plan      1. Gastroesophageal reflux disease with  esophagitis without hemorrhage  Reflux reasonably controlled with pantoprazole 20 mg twice a day.  Denies difficulty swallowing.  EGD with changes suspicious for Castillo's, biopsies with minimal chronic gastritis, no intestinal metaplasia.  Antireflux measures.  Continue pantoprazole 20 mg 1 p.o. twice a day.  Patient does not wish to try decreasing dose as reflux is too severe.    - pantoprazole (PROTONIX) 20 MG EC tablet; Take 1 tablet by mouth 2 (Two) Times a Day.  Dispense: 180 tablet; Refill: 3    2/24/2022  She has a history of reflux for several years and is taking Pantoprazole 20 mg in the morning and 20 mg in the evening with reasonable control. Continue same for now. She has cut back from 40 mg in the morning and 20 mg at night. Denies difficulty swallowing. She had EGD in 2019 with LA class C erosive distal esophagitis.    2. Adenomatous polyp of colon, unspecified part of colon  Colonoscopy with polyps removed, tubular adenomas without dysplasia.  No family history of colon cancer.   Colonoscopy for surveillance in 5 years, 2027.    3. History of pancreatitis  Patient denies abdominal pain, nausea or vomiting.  CTAP with pancreas unremarkable, no mass or ductal dilatation.  Spleen unremarkable.  Liver unremarkable. Colonic wall thickening noted, thought to be nonspecific colitis, but patient denies abdominal pain, changes in bowel habits or rectal bleeding. Colonoscopy with polyps removed, no evidence of colitis or ileitis, terminal ileum and random colon biopsies unremarkable.  High fiber, low fat diet with liberal water intake.   Will monitor for now.    2/24/2022  Patient has not had any further episodes of abdominal pain, back pain or chest pain. Denies nausea or vomiting. Denies GI bleeding.   CTAP to ensure clearance of fat stranding adjacent to the pancreatic body and tail.     11/24/2021  About 11 days ago she went to the emergency room with abdominal pain, back pain and chest pain.  She was told  she had pancreatitis after CT scan.  No significant history of nausea.   Symptoms resolved after a few days and have not reoccurred.  Patient denies alcohol use.  According to the patient her cholesterol levels were checked a few months ago and were normal, she is scheduled for follow-up lipid panel in the next week or so.  No new medications.  CT angiogram of aorta with ill-defined pancreas with fat stranding adjacent to the pancreatic body and tail, concerning for pancreatitis, although lipase level normal at 16. No acute abnormality of the liver or spleen noted.  No history of elevated liver enzymes.  No anemia. CT of chest in July 2019 with stranding and inflammation surrounding the tail of the pancreas compatible with acute pancreatitis noted. Abdominal ultrasound in September 2019 unremarkable, CBD normal at 3.96 mm, visualized pancreas unremarkable.    Patient Instructions   1. Antireflux measures: Avoid fried, fatty foods, alcohol, chocolate, coffee, tea,  soft drinks, peppermint and spearmint, spicy foods, tomatoes and tomato based foods, onions, peppers, and smoking.   2. Other antireflux measures include weight reduction if overweight, avoiding tight clothing around the abdomen, elevating the head of the bed 6 inches with blocks under the head board, and don't drink or eat before going to bed and avoid lying down immediately after meals.  3. Avoid vaping/smoking.  4. Pantoprazole 20 mg 1 by mouth twice a day for reflux.   5. High fiber, low fat diet with liberal water intake.   6. Colonoscopy for surveillance in 5 years, 2027.  7. Follow up: 1 year or sooner if needed    Lori Linares, APRN  4/26/2022    Please note that portions of this note may have been completed with a voice recognition program. Efforts were made to edit the dictations, but occasionally words are mistranscribed.

## 2022-04-27 ENCOUNTER — HOSPITAL ENCOUNTER (OUTPATIENT)
Dept: CARDIOLOGY | Facility: HOSPITAL | Age: 67
Discharge: HOME | End: 2022-04-27
Admitting: PHYSICIAN ASSISTANT

## 2022-04-27 DIAGNOSIS — R07.2 PRECORDIAL PAIN: ICD-10-CM

## 2022-04-27 PROCEDURE — 93306 TTE W/DOPPLER COMPLETE: CPT

## 2022-04-27 PROCEDURE — 93306 TTE W/DOPPLER COMPLETE: CPT | Performed by: INTERNAL MEDICINE

## 2022-04-29 LAB
BH CV ECHO MEAS - ACS: 2 CM
BH CV ECHO MEAS - AO MAX PG: 12.1 MMHG
BH CV ECHO MEAS - AO MEAN PG: 5 MMHG
BH CV ECHO MEAS - AO ROOT DIAM: 2.45 CM
BH CV ECHO MEAS - AO V2 MAX: 174 CM/SEC
BH CV ECHO MEAS - AO V2 VTI: 38.4 CM
BH CV ECHO MEAS - EDV(CUBED): 66.9 ML
BH CV ECHO MEAS - EDV(MOD-SP4): 41.8 ML
BH CV ECHO MEAS - EF(MOD-SP4): 60.5 %
BH CV ECHO MEAS - ESV(CUBED): 21.3 ML
BH CV ECHO MEAS - ESV(MOD-SP4): 16.5 ML
BH CV ECHO MEAS - FS: 31.8 %
BH CV ECHO MEAS - IVS/LVPW: 0.9 CM
BH CV ECHO MEAS - IVSD: 0.94 CM
BH CV ECHO MEAS - LA DIMENSION: 2.7 CM
BH CV ECHO MEAS - LAT PEAK E' VEL: 10.7 CM/SEC
BH CV ECHO MEAS - LV DIASTOLIC VOL/BSA (35-75): 23.1 CM2
BH CV ECHO MEAS - LV MASS(C)D: 128.2 GRAMS
BH CV ECHO MEAS - LV SYSTOLIC VOL/BSA (12-30): 9.1 CM2
BH CV ECHO MEAS - LVIDD: 4.1 CM
BH CV ECHO MEAS - LVIDS: 2.8 CM
BH CV ECHO MEAS - LVOT AREA: 2.27 CM2
BH CV ECHO MEAS - LVOT DIAM: 1.7 CM
BH CV ECHO MEAS - LVPWD: 1.04 CM
BH CV ECHO MEAS - MED PEAK E' VEL: 8.8 CM/SEC
BH CV ECHO MEAS - MV A MAX VEL: 71.1 CM/SEC
BH CV ECHO MEAS - MV E MAX VEL: 99.5 CM/SEC
BH CV ECHO MEAS - MV E/A: 1.4
BH CV ECHO MEAS - PA ACC TIME: 0.13 SEC
BH CV ECHO MEAS - PA PR(ACCEL): 18.7 MMHG
BH CV ECHO MEAS - RAP SYSTOLE: 10 MMHG
BH CV ECHO MEAS - RVSP: 35.8 MMHG
BH CV ECHO MEAS - SI(MOD-SP4): 14 ML/M2
BH CV ECHO MEAS - SV(MOD-SP4): 25.3 ML
BH CV ECHO MEAS - TAPSE (>1.6): 2.12 CM
BH CV ECHO MEAS - TR MAX PG: 25.8 MMHG
BH CV ECHO MEAS - TR MAX VEL: 254 CM/SEC
BH CV ECHO MEASUREMENTS AVERAGE E/E' RATIO: 10.21
LEFT ATRIUM VOLUME INDEX: 9.4 ML/M2
MAXIMAL PREDICTED HEART RATE: 155 BPM
STRESS TARGET HR: 132 BPM

## 2022-05-18 ENCOUNTER — OFFICE VISIT (OUTPATIENT)
Dept: PULMONOLOGY | Facility: CLINIC | Age: 67
End: 2022-05-18

## 2022-05-18 VITALS
HEIGHT: 65 IN | HEART RATE: 61 BPM | OXYGEN SATURATION: 98 % | SYSTOLIC BLOOD PRESSURE: 138 MMHG | TEMPERATURE: 98.9 F | BODY MASS INDEX: 26.16 KG/M2 | DIASTOLIC BLOOD PRESSURE: 78 MMHG | WEIGHT: 157 LBS

## 2022-05-18 DIAGNOSIS — J44.9 CHRONIC OBSTRUCTIVE PULMONARY DISEASE, UNSPECIFIED COPD TYPE: Primary | ICD-10-CM

## 2022-05-18 DIAGNOSIS — F17.210 CIGARETTE NICOTINE DEPENDENCE WITHOUT COMPLICATION: ICD-10-CM

## 2022-05-18 DIAGNOSIS — R91.8 MULTIPLE PULMONARY NODULES: ICD-10-CM

## 2022-05-18 DIAGNOSIS — E66.3 OVERWEIGHT: ICD-10-CM

## 2022-05-18 PROCEDURE — 99214 OFFICE O/P EST MOD 30 MIN: CPT | Performed by: NURSE PRACTITIONER

## 2022-05-18 RX ORDER — DOXYCYCLINE HYCLATE 100 MG/1
100 CAPSULE ORAL 2 TIMES DAILY
Qty: 20 CAPSULE | Refills: 0 | Status: SHIPPED | OUTPATIENT
Start: 2022-05-18 | End: 2022-10-27

## 2022-05-18 RX ORDER — PREDNISONE 20 MG/1
40 TABLET ORAL DAILY
Qty: 10 TABLET | Refills: 0 | Status: SHIPPED | OUTPATIENT
Start: 2022-05-18 | End: 2022-10-27

## 2022-05-18 RX ORDER — IPRATROPIUM BROMIDE AND ALBUTEROL SULFATE 2.5; .5 MG/3ML; MG/3ML
3 SOLUTION RESPIRATORY (INHALATION) 4 TIMES DAILY PRN
Qty: 360 ML | Refills: 5 | Status: SHIPPED | OUTPATIENT
Start: 2022-05-18 | End: 2022-11-23 | Stop reason: SDUPTHER

## 2022-05-18 NOTE — PROGRESS NOTES
"Chief Complaint  COPD    Subjective          Itzel Hagen presents to [facility] PULMONARY [facility] CRITICAL [facility]ARE MEDICINE  History of Present Illness     Ms. Hagen is a 66 year old female with a medical history significant for hypertension, chronic bronchitis, COPD, GERD, and hyperlipidemia.    She presents today for a routine follow up on COPD.  She states that she has been doing well since her last visit.  She states that her shortness of breath is at baseline.  She is on anoro once daily, albuterol and duo nebs as needed.  She tells me that she needs a refill of her COPD rescue kit.  She remains a smoker of 1/2 ppd.    Objective   Vital Signs:  /78   Pulse 61   Temp 98.9 °F (37.2 °C) (Temporal)   Ht 165.1 cm (65\")   Wt 71.2 kg (157 lb)   SpO2 98%   BMI 26.13 kg/m²   BMI is >= 25 and < 30. (Overweight) The following options were offered after discussion: exercise counseling/recommendations and nutrition counseling/recommendations      Physical Exam     GENERAL APPEARANCE: Well developed, well nourished, alert and cooperative, and appears to be in no acute distress.    HEAD: normocephalic. Atraumatic.    EYES: PERRL, EOMI. Vision is grossly intact.    THROAT: Oral cavity and pharynx normal. No inflammation, swelling, exudate, or lesions.     NECK: Neck supple.  No thyromegaly.    CARDIAC: Normal S1 and S2. No S3, S4 or murmurs. Rhythm is regular.     RESPIRATORY:Bilateral air entry positive. Bilateral diminished breath sounds. No wheezing, crackles or rhonchi noted.    GI: Positive bowel sounds. Soft, nondistended, nontender.     MUSCULOSKELETAL: No significant deformity or joint abnormality. No edema. Peripheral pulses intact. No varicosities.    NEUROLOGICAL: Strength and sensation symmetric and intact throughout.     PSYCHIATRIC: The mental examination revealed the patient was oriented to person, place, and time.     Result Review :   The following data was reviewed by: Sharon " Licha Rhodes, APRN on 05/18/2022:  Common labs    Common Labsle 11/13/21 11/13/21 3/24/22 3/24/22 3/24/22    2148 2148 1425 1425 1428   Glucose  139 (A) 108 (A)     BUN  11 11     Creatinine  0.77 0.82  0.80   eGFR Non African Am  75      Sodium  134 (A) 140     Potassium  3.9 4.0     Chloride  96 (A) 101     Calcium  9.3 9.7     Albumin  4.14 4.40     Total Bilirubin  0.6 0.6     Alkaline Phosphatase  92 180 (A)     AST (SGOT)  19 24     ALT (SGPT)  14 18     WBC 14.69 (A)       Hemoglobin 14.0       Hematocrit 41.6       Platelets 178       Total Cholesterol    115    Triglycerides    124    HDL Cholesterol    45    LDL Cholesterol     48    (A) Abnormal value       Comments are available for some flowsheets but are not being displayed.           Data reviewed: CT chest          Assessment and Plan    Diagnoses and all orders for this visit:    1. Chronic obstructive pulmonary disease, unspecified COPD type (HCC) (Primary)    2. Multiple pulmonary nodules  -     CT Chest Without Contrast; Future    3. Cigarette nicotine dependence without complication    4. Overweight    Other orders  -     ipratropium-albuterol (DUO-NEB) 0.5-2.5 mg/3 ml nebulizer; Take 3 mL by nebulization 4 (Four) Times a Day As Needed for Wheezing.  Dispense: 360 mL; Refill: 5  -     predniSONE (DELTASONE) 20 MG tablet; Take 2 tablets by mouth Daily.  Dispense: 10 tablet; Refill: 0  -     doxycycline (VIBRAMYCIN) 100 MG capsule; Take 1 capsule by mouth 2 (Two) Times a Day.  Dispense: 20 capsule; Refill: 0        COPD, unspecified type:  -Continue anoro once daily.  -Continue albuterol every 4 hours as needed.  -Continue duo nebs every 4 hours as needed.  Refills sent.    Refills sent for COPD rescue kit.      Multiple pulmonary nodules:  She will be due a repeat CT chest in November 2022.     Current smoker:  She is currently smoking about 1/2 ppd.    [facility]  reports that she has been smoking cigarettes. She started smoking about 51  years ago. She has been smoking about 0.50 packs per day. She has never used smokeless tobacco.. I have educated her on the risk of diseases from using tobacco products such as cancer, COPD and heart disease.     I advised her to quit and she is not willing to quit.            Follow Up   Return in about 6 months (around 11/18/2022).  Patient was given instructions and counseling regarding her condition or for health maintenance advice. Please see specific information pulled into the AVS if appropriate.

## 2022-10-07 RX ORDER — ATORVASTATIN CALCIUM 80 MG/1
TABLET, FILM COATED ORAL
Qty: 90 TABLET | Refills: 3 | Status: SHIPPED | OUTPATIENT
Start: 2022-10-07 | End: 2023-03-20 | Stop reason: SDUPTHER

## 2022-10-26 ENCOUNTER — OFFICE VISIT (OUTPATIENT)
Dept: CARDIOLOGY | Facility: CLINIC | Age: 67
End: 2022-10-26

## 2022-10-26 VITALS
HEIGHT: 65 IN | SYSTOLIC BLOOD PRESSURE: 135 MMHG | OXYGEN SATURATION: 94 % | HEART RATE: 61 BPM | DIASTOLIC BLOOD PRESSURE: 65 MMHG | BODY MASS INDEX: 24.66 KG/M2 | WEIGHT: 148 LBS

## 2022-10-26 DIAGNOSIS — I10 ESSENTIAL HYPERTENSION: ICD-10-CM

## 2022-10-26 DIAGNOSIS — R07.2 PRECORDIAL PAIN: Primary | ICD-10-CM

## 2022-10-26 PROCEDURE — 93000 ELECTROCARDIOGRAM COMPLETE: CPT | Performed by: PHYSICIAN ASSISTANT

## 2022-10-26 PROCEDURE — 99213 OFFICE O/P EST LOW 20 MIN: CPT | Performed by: PHYSICIAN ASSISTANT

## 2022-10-26 NOTE — PROGRESS NOTES
Provider, No Known  Itzel Hagen  1955  10/26/2022    Patient Active Problem List   Diagnosis   • Precordial pain   • Angina, class III (HCC)   • Cigarette nicotine dependence without complication   • Family history of premature coronary artery disease   • Heartburn   • Acute pancreatitis without infection or necrosis   • Abnormal CT of the abdomen   • Chest pain   • Chronic fatigue syndrome   • Gastro-esophageal reflux disease without esophagitis   • Chronic pancreatitis (HCC)   • Esophagitis, erosive   • Gastritis, erosive   • Essential hypertension   • Gastroesophageal reflux disease with esophagitis without hemorrhage   • Encounter for screening for malignant neoplasm of colon       Dear Provider, No Known:    Subjective     History of Present Illness:    Chief Complaint   Patient presents with   • Follow-up     6 mth echo   • Med Management     Shahbaz Hagen is a pleasant 66 y.o. female with a past medical history significant for nonobstructive coronary artery disease with 20% stenosis in the RCA, tobacco abuse, family history of coronary artery disease with mother having CABG in a brother received stents in their 40s.  She does not have history of diabetes mellitus, however, does smoke tobacco. She comes in today for cardiology follow-up.    Thumb is going through difficult time right now she reports that her  and 46 years passed away just to few weeks ago and is still grieving.  She reports since this occurred she has had some pain in her chest however is also had extreme difficulty sleeping reporting just 1 to 2 hours a night.        Allergies   Allergen Reactions   • Penicillins Anaphylaxis   • Coconut Swelling and Rash     Tongue swells   • Morphine Swelling   :      Current Outpatient Medications:   •  ALBUTEROL SULFATE IN, Inhale. As needed every 4-6 hours, Disp: , Rfl:   •  ascorbic acid (VITAMIN C) 1000 MG tablet, Take 1,000 mg by mouth Daily., Disp: , Rfl:   •   "aspirin 81 MG EC tablet, Take 1 tablet by mouth Daily., Disp: 90 tablet, Rfl: 2  •  atorvastatin (LIPITOR) 80 MG tablet, TAKE 1 TABLET DAILY, Disp: 90 tablet, Rfl: 3  •  B Complex Vitamins (VITAMIN B COMPLEX PO), Take 2 capsules by mouth Daily., Disp: , Rfl:   •  cholecalciferol (VITAMIN D3) 25 MCG (1000 UT) tablet, Take 5,000 Units by mouth Daily., Disp: , Rfl:   •  doxycycline (VIBRAMYCIN) 100 MG capsule, Take 1 capsule by mouth 2 (Two) Times a Day., Disp: 20 capsule, Rfl: 0  •  ipratropium-albuterol (DUO-NEB) 0.5-2.5 mg/3 ml nebulizer, Take 3 mL by nebulization 4 (Four) Times a Day As Needed for Wheezing., Disp: 360 mL, Rfl: 5  •  isosorbide mononitrate (IMDUR) 120 MG 24 hr tablet, Take 1 tablet by mouth Every Morning., Disp: 90 tablet, Rfl: 3  •  metoprolol succinate XL (TOPROL-XL) 25 MG 24 hr tablet, Take 1 tablet by mouth Daily., Disp: 90 tablet, Rfl: 3  •  nitroglycerin (NITROSTAT) 0.4 MG SL tablet, Place 0.4 mg under the tongue Every 5 (Five) Minutes As Needed for Chest Pain. Take no more than 3 doses in 15 minutes., Disp: , Rfl:   •  pantoprazole (PROTONIX) 20 MG EC tablet, Take 1 tablet by mouth 2 (Two) Times a Day., Disp: 180 tablet, Rfl: 3  •  predniSONE (DELTASONE) 20 MG tablet, Take 2 tablets by mouth Daily., Disp: 10 tablet, Rfl: 0  •  umeclidinium-vilanterol (ANORO ELLIPTA) 62.5-25 MCG/INH aerosol powder  inhaler, Inhale 1 puff Daily., Disp: 60 each, Rfl: 6    The following portions of the patient's history were reviewed and updated as appropriate: allergies, current medications, past family history, past medical history, past social history, past surgical history and problem list.    Social History     Tobacco Use   • Smoking status: Every Day     Packs/day: 0.50     Types: Cigarettes     Start date: 1971   • Smokeless tobacco: Never   Vaping Use   • Vaping Use: Never used   Substance Use Topics   • Alcohol use: No     Comment: \"not in 30 years\"   • Drug use: No         Objective   Vitals:    " "10/26/22 1434   BP: 135/65   BP Location: Right arm   Patient Position: Sitting   Cuff Size: Adult   Pulse: 61   SpO2: 94%   Weight: 67.1 kg (148 lb)   Height: 165.1 cm (65\")     Body mass index is 24.63 kg/m².    Constitutional:       General: Not in acute distress.     Appearance: Healthy appearance. Well-developed and not in distress. Not diaphoretic.   Eyes:      Conjunctiva/sclera: Conjunctivae normal.      Pupils: Pupils are equal, round, and reactive to light.   HENT:      Head: Normocephalic and atraumatic.   Neck:      Vascular: No carotid bruit or JVD.   Pulmonary:      Effort: Pulmonary effort is normal. No respiratory distress.      Breath sounds: Normal breath sounds.   Cardiovascular:      Normal rate. Regular rhythm.   Skin:     General: Skin is cool.   Neurological:      Mental Status: Alert, oriented to person, place, and time and oriented to person, place and time.         Lab Results   Component Value Date     03/24/2022    K 4.0 03/24/2022     03/24/2022    CO2 28.0 03/24/2022    BUN 11 03/24/2022    CREATININE 0.80 03/24/2022    GLUCOSE 108 (H) 03/24/2022    CALCIUM 9.7 03/24/2022    AST 24 03/24/2022    ALT 18 03/24/2022    ALKPHOS 180 (H) 03/24/2022     No results found for: CKTOTAL  Lab Results   Component Value Date    WBC 14.69 (H) 11/13/2021    HGB 14.0 11/13/2021    HCT 41.6 11/13/2021     11/13/2021     Lab Results   Component Value Date    INR 0.99 11/13/2021     Lab Results   Component Value Date    MG 1.5 (L) 11/13/2021     Lab Results   Component Value Date    TRIG 124 03/24/2022    HDL 45 03/24/2022    LDL 48 03/24/2022      No results found for: BNP    During this visit the following were done:  Labs Reviewed []    Labs Ordered []    Radiology Reports Reviewed []    Radiology Ordered []    PCP Records Reviewed []    Referring Provider Records Reviewed []    ER Records Reviewed []    Hospital Records Reviewed []    History Obtained From Family []    Radiology " Images Reviewed []    Other Reviewed []    Records Requested []         ECG 12 Lead    Date/Time: 10/26/2022 2:22 PM  Performed by: Sebastian Fuchs PA-C  Authorized by: Sebastian Fuchs PA-C   Comparison: compared with previous ECG   Similar to previous ECG  Rhythm: sinus rhythm  Conduction: conduction normal    Clinical impression: normal ECG            Assessment & Plan    Diagnosis Plan   1. Precordial pain        2. Essential hypertension                 Recommendations:  1. Precordial pain  a. With the recent passing of her  I do think her symptoms are an expected effect from her grieving.  For now would like to hold off on any significant ischemic evaluation for at least a few more weeks.  However I did ask her that if her symptoms worsen to contact our office or to go to the nearest emergency room.  In the meantime I will continue aspirin, Lipitor, Imdur, metoprolol.    No follow-ups on file.    As always, I appreciate very much the opportunity to participate in the cardiovascular care of your patients.      With Best Regards,    Sebastian Fuchs PA-C

## 2022-10-27 ENCOUNTER — TELEPHONE (OUTPATIENT)
Dept: CARDIOLOGY | Facility: CLINIC | Age: 67
End: 2022-10-27

## 2022-10-27 ENCOUNTER — OFFICE VISIT (OUTPATIENT)
Dept: FAMILY MEDICINE CLINIC | Facility: CLINIC | Age: 67
End: 2022-10-27

## 2022-10-27 VITALS
OXYGEN SATURATION: 96 % | BODY MASS INDEX: 24.62 KG/M2 | TEMPERATURE: 97.5 F | DIASTOLIC BLOOD PRESSURE: 70 MMHG | SYSTOLIC BLOOD PRESSURE: 138 MMHG | HEIGHT: 65 IN | WEIGHT: 147.8 LBS | HEART RATE: 71 BPM | RESPIRATION RATE: 19 BRPM

## 2022-10-27 DIAGNOSIS — F51.02 ADJUSTMENT INSOMNIA: ICD-10-CM

## 2022-10-27 DIAGNOSIS — F41.9 ANXIETY AND DEPRESSION: ICD-10-CM

## 2022-10-27 DIAGNOSIS — L98.9 SKIN LESION: Primary | ICD-10-CM

## 2022-10-27 DIAGNOSIS — F32.A ANXIETY AND DEPRESSION: ICD-10-CM

## 2022-10-27 PROCEDURE — 99214 OFFICE O/P EST MOD 30 MIN: CPT | Performed by: FAMILY MEDICINE

## 2022-10-27 RX ORDER — SERTRALINE HYDROCHLORIDE 25 MG/1
25 TABLET, FILM COATED ORAL DAILY
Qty: 30 TABLET | Refills: 2 | Status: SHIPPED | OUTPATIENT
Start: 2022-10-27 | End: 2022-11-16

## 2022-10-27 NOTE — TELEPHONE ENCOUNTER
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     Do you require a callback: YES

## 2022-10-27 NOTE — PROGRESS NOTES
"Chief Complaint  Anxiety and Depression    Subjective          Itzel Hagen presents to [facility]  Anxiety  Presents for initial visit. Onset was 1 to 6 months ago. The problem has been unchanged. Symptoms include decreased concentration, depressed mood, excessive worry, insomnia, nervous/anxious behavior, panic and restlessness. Patient reports no suicidal ideas. Symptoms occur constantly. The severity of symptoms is moderate.     Risk factors include a major life event ( recently passed away). There is no history of suicide attempts. Past treatments include nothing.       Review of Systems   Psychiatric/Behavioral: Positive for decreased concentration. Negative for suicidal ideas. The patient is nervous/anxious and has insomnia.          Objective   Vital Signs:   /70 (BP Location: Right arm, Patient Position: Sitting)   Pulse 71   Temp 97.5 °F (36.4 °C)   Resp 19   Ht 165.1 cm (65\")   Wt 67 kg (147 lb 12.8 oz)   SpO2 96%   BMI 24.60 kg/m²     Physical Exam  Constitutional:       General: She is not in acute distress.     Appearance: Normal appearance. She is well-developed and well-groomed. She is not ill-appearing, toxic-appearing or diaphoretic.   HENT:      Head: Normocephalic.      Nose: Nose normal. No congestion or rhinorrhea.      Mouth/Throat:      Mouth: Mucous membranes are moist.      Pharynx: Oropharynx is clear. No oropharyngeal exudate or posterior oropharyngeal erythema.   Eyes:      General: Lids are normal.         Right eye: No discharge.         Left eye: No discharge.      Extraocular Movements: Extraocular movements intact.      Pupils: Pupils are equal, round, and reactive to light.   Neck:      Vascular: No carotid bruit.   Cardiovascular:      Rate and Rhythm: Normal rate and regular rhythm.      Pulses: Normal pulses.      Heart sounds: Normal heart sounds. No murmur heard.    No friction rub. No gallop.   Pulmonary:      Effort: " Pulmonary effort is normal. No respiratory distress.      Breath sounds: Normal breath sounds. No stridor. No wheezing, rhonchi or rales.   Chest:      Chest wall: No tenderness.   Abdominal:      General: Bowel sounds are normal. There is no distension.      Palpations: Abdomen is soft. There is no mass.      Tenderness: There is no abdominal tenderness. There is no right CVA tenderness, left CVA tenderness, guarding or rebound.      Hernia: No hernia is present.   Musculoskeletal:         General: No swelling or tenderness. Normal range of motion.      Cervical back: Normal range of motion and neck supple. No rigidity or tenderness.      Right lower leg: No edema.      Left lower leg: No edema.   Lymphadenopathy:      Cervical: No cervical adenopathy.   Skin:     General: Skin is warm.      Capillary Refill: Capillary refill takes less than 2 seconds.      Coloration: Skin is not jaundiced.      Findings: No bruising, erythema or rash.   Neurological:      General: No focal deficit present.      Mental Status: She is alert and oriented to person, place, and time.      Motor: Motor function is intact. No weakness.      Coordination: Coordination is intact.      Gait: Gait is intact. Gait normal.   Psychiatric:         Attention and Perception: Attention normal.         Mood and Affect: Mood normal.         Speech: Speech normal.         Behavior: Behavior normal.         Cognition and Memory: Cognition normal.         Judgment: Judgment normal.        Result Review :                 Assessment and Plan    Diagnoses and all orders for this visit:    1. Skin lesion (Primary)  -     Ambulatory Referral to Dermatology    2. Adjustment insomnia  -     Melatonin 1 MG capsule; Take 1 mg by mouth Every Night.  Dispense: 30 each; Refill: 2    3. Anxiety and depression  -     sertraline (Zoloft) 25 MG tablet; Take 1 tablet by mouth Daily.  Dispense: 30 tablet; Refill: 2      Patient's Body mass index is 24.6 kg/m².  indicating that she is within normal range (BMI 18.5-24.9). No BMI management plan needed..    Follow Up   Return in about 2 weeks (around 11/10/2022) for Medicare Wellness.  Patient was given instructions and counseling regarding her condition or for health maintenance advice. Please see specific information pulled into the AVS if appropriate.     This document has been electronically signed by DEWAYNE Dunbar  October 31, 2022 13:16 EDT

## 2022-11-04 DIAGNOSIS — R07.2 PRECORDIAL PAIN: Primary | ICD-10-CM

## 2022-11-11 ENCOUNTER — HOSPITAL ENCOUNTER (OUTPATIENT)
Dept: CT IMAGING | Facility: HOSPITAL | Age: 67
Discharge: HOME | End: 2022-11-11
Admitting: NURSE PRACTITIONER

## 2022-11-11 DIAGNOSIS — R91.8 MULTIPLE PULMONARY NODULES: ICD-10-CM

## 2022-11-11 PROCEDURE — 71250 CT THORAX DX C-: CPT | Performed by: RADIOLOGY

## 2022-11-11 PROCEDURE — 71250 CT THORAX DX C-: CPT

## 2022-11-14 ENCOUNTER — HOSPITAL ENCOUNTER (OUTPATIENT)
Dept: CARDIOLOGY | Facility: HOSPITAL | Age: 67
Discharge: HOME | End: 2022-11-14
Admitting: PHYSICIAN ASSISTANT

## 2022-11-14 DIAGNOSIS — R07.2 PRECORDIAL PAIN: ICD-10-CM

## 2022-11-14 PROCEDURE — 93017 CV STRESS TEST TRACING ONLY: CPT

## 2022-11-14 PROCEDURE — 93018 CV STRESS TEST I&R ONLY: CPT | Performed by: INTERNAL MEDICINE

## 2022-11-16 ENCOUNTER — OFFICE VISIT (OUTPATIENT)
Dept: FAMILY MEDICINE CLINIC | Facility: CLINIC | Age: 67
End: 2022-11-16

## 2022-11-16 ENCOUNTER — OFFICE VISIT (OUTPATIENT)
Dept: PULMONOLOGY | Facility: CLINIC | Age: 67
End: 2022-11-16

## 2022-11-16 VITALS
OXYGEN SATURATION: 96 % | SYSTOLIC BLOOD PRESSURE: 120 MMHG | DIASTOLIC BLOOD PRESSURE: 78 MMHG | TEMPERATURE: 96.1 F | WEIGHT: 148.6 LBS | HEIGHT: 65 IN | BODY MASS INDEX: 24.76 KG/M2 | HEART RATE: 58 BPM

## 2022-11-16 VITALS
TEMPERATURE: 97.7 F | HEIGHT: 65 IN | WEIGHT: 145 LBS | BODY MASS INDEX: 24.16 KG/M2 | DIASTOLIC BLOOD PRESSURE: 80 MMHG | OXYGEN SATURATION: 99 % | SYSTOLIC BLOOD PRESSURE: 128 MMHG | HEART RATE: 68 BPM

## 2022-11-16 DIAGNOSIS — J44.9 CHRONIC OBSTRUCTIVE PULMONARY DISEASE, UNSPECIFIED COPD TYPE: Primary | ICD-10-CM

## 2022-11-16 DIAGNOSIS — F17.210 CIGARETTE NICOTINE DEPENDENCE WITHOUT COMPLICATION: ICD-10-CM

## 2022-11-16 DIAGNOSIS — Z00.00 MEDICARE ANNUAL WELLNESS VISIT, SUBSEQUENT: Primary | ICD-10-CM

## 2022-11-16 DIAGNOSIS — R91.1 PULMONARY NODULE: ICD-10-CM

## 2022-11-16 LAB
BH CV STRESS BP STAGE 1: NORMAL
BH CV STRESS DURATION MIN STAGE 1: 3
BH CV STRESS DURATION MIN STAGE 2: 1
BH CV STRESS DURATION SEC STAGE 1: 0
BH CV STRESS DURATION SEC STAGE 2: 11
BH CV STRESS GRADE STAGE 1: 10
BH CV STRESS GRADE STAGE 2: 12
BH CV STRESS HR STAGE 1: 121
BH CV STRESS HR STAGE 2: 130
BH CV STRESS METS STAGE 1: 5
BH CV STRESS METS STAGE 2: 7.5
BH CV STRESS PROTOCOL 1: NORMAL
BH CV STRESS RECOVERY BP: NORMAL MMHG
BH CV STRESS RECOVERY HR: 60 BPM
BH CV STRESS SPEED STAGE 1: 1.7
BH CV STRESS SPEED STAGE 2: 2.5
BH CV STRESS STAGE 1: 1
BH CV STRESS STAGE 2: 2
MAXIMAL PREDICTED HEART RATE: 154 BPM
PERCENT MAX PREDICTED HR: 84.42 %
STRESS BASELINE BP: NORMAL MMHG
STRESS BASELINE HR: 61 BPM
STRESS PERCENT HR: 99 %
STRESS POST ESTIMATED WORKLOAD: 12 METS
STRESS POST EXERCISE DUR MIN: 4 MIN
STRESS POST EXERCISE DUR SEC: 11 SEC
STRESS POST PEAK BP: NORMAL MMHG
STRESS POST PEAK HR: 130 BPM
STRESS TARGET HR: 131 BPM

## 2022-11-16 PROCEDURE — 99214 OFFICE O/P EST MOD 30 MIN: CPT | Performed by: NURSE PRACTITIONER

## 2022-11-16 PROCEDURE — G0439 PPPS, SUBSEQ VISIT: HCPCS | Performed by: FAMILY MEDICINE

## 2022-11-16 PROCEDURE — 1159F MED LIST DOCD IN RCRD: CPT | Performed by: FAMILY MEDICINE

## 2022-11-16 PROCEDURE — 1170F FXNL STATUS ASSESSED: CPT | Performed by: FAMILY MEDICINE

## 2022-11-16 RX ORDER — TRAZODONE HYDROCHLORIDE 50 MG/1
50 TABLET ORAL NIGHTLY
Qty: 30 TABLET | Refills: 2 | Status: SHIPPED | OUTPATIENT
Start: 2022-11-16 | End: 2022-12-14 | Stop reason: SDUPTHER

## 2022-11-16 NOTE — PROGRESS NOTES
"Annual Exam      HEALTH RISK ASSESSMENT    1955    Recent Hospitalizations:  No hospitalization(s) within the last year..        Current Medical Providers:  Patient Care Team:  Leonie Hdz APRN as PCP - General (Nurse Practitioner)        Smoking Status:  Social History     Tobacco Use   Smoking Status Every Day   • Packs/day: 0.50   • Types: Cigarettes   • Start date: 1971   Smokeless Tobacco Never       Alcohol Consumption:  Social History     Substance and Sexual Activity   Alcohol Use No    Comment: \"not in 30 years\"       Depression Screen:   PHQ-2/PHQ-9 Depression Screening 10/27/2022   Little Interest or Pleasure in Doing Things 3-->nearly every day   Feeling Down, Depressed or Hopeless 3-->nearly every day   Trouble Falling or Staying Asleep, or Sleeping Too Much 3-->nearly every day   Feeling Tired or Having Little Energy 3-->nearly every day   Poor Appetite or Overeating 3-->nearly every day   Feeling Bad about Yourself - or that You are a Failure or Have Let Yourself or Your Family Down 0-->not at all   Trouble Concentrating on Things, Such as Reading the Newspaper or Watching Television 3-->nearly every day   Moving or Speaking So Slowly that Other People Could Have Noticed? Or the Opposite - Being So Fidgety 0-->not at all   Thoughts that You Would be Better Off Dead or of Hurting Yourself in Some Way 0-->not at all   PHQ-9: Brief Depression Severity Measure Score 18   If You Checked Off Any Problems, How Difficult Have These Problems Made It For You to Do Your Work, Take Care of Things at Home, or Get Along with Other People? extremely difficult       Health Habits and Functional and Cognitive Screening:  Functional & Cognitive Status 11/16/2022   Do you have difficulty preparing food and eating? No   Do you have difficulty bathing yourself, getting dressed or grooming yourself? No   Do you have difficulty using the toilet? No   Do you have difficulty moving around from place to place? No "   Do you have trouble with steps or getting out of a bed or a chair? No   Current Diet Well Balanced Diet   Dental Exam Other   Eye Exam Up to date   Exercise (times per week) 0 times per week   Current Exercises Include No Regular Exercise   Do you need help using the phone?  No   Are you deaf or do you have serious difficulty hearing?  No   Do you need help with transportation? No   Do you need help shopping? No   Do you need help preparing meals?  No   Do you need help with housework?  No   Do you need help with laundry? No   Do you need help taking your medications? No   Do you need help managing money? No   Do you ever drive or ride in a car without wearing a seat belt? No           Does the patient have evidence of cognitive impairment? No    Aspirin use counseling: Taking ASA appropriately as indicated      Recent Lab Results:  CMP:  Lab Results   Component Value Date    BUN 11 03/24/2022    CREATININE 0.80 03/24/2022    EGFRIFNONA 75 11/13/2021    BCR 13.4 03/24/2022     03/24/2022    K 4.0 03/24/2022    CO2 28.0 03/24/2022    CALCIUM 9.7 03/24/2022    ALBUMIN 4.40 03/24/2022    BILITOT 0.6 03/24/2022    ALKPHOS 180 (H) 03/24/2022    AST 24 03/24/2022    ALT 18 03/24/2022     Lipid Panel:  Lab Results   Component Value Date    CHOL 115 03/24/2022    TRIG 124 03/24/2022    HDL 45 03/24/2022    VLDL 22 03/24/2022    LDLHDL 1.00 03/24/2022     HbA1c:       Visual Acuity:  Vision Screening    Right eye Left eye Both eyes   Without correction 20 10 20 10 20 10   With correction          Age-appropriate Screening Schedule:  Refer to the list below for future screening recommendations based on patient's age, sex and/or medical conditions. Orders for these recommended tests are listed in the plan section. The patient has been provided with a written plan.    Health Maintenance   Topic Date Due   • DXA SCAN  Never done   • TDAP/TD VACCINES (1 - Tdap) Never done   • ZOSTER VACCINE (1 of 2) Never done   •  INFLUENZA VACCINE  03/31/2023 (Originally 8/1/2022)   • MAMMOGRAM  10/27/2023 (Originally 1955)   • LIPID PANEL  03/24/2023        Subjective   History of Present Illness    Itzel Hagen is a 67 y.o. female who presents for an annual physical exam.    The following portions of the patient's history were reviewed and updated as appropriate: allergies, current medications, past family history, past medical history, past social history, past surgical history and problem list.    Outpatient Medications Prior to Visit   Medication Sig Dispense Refill   • ALBUTEROL SULFATE IN Inhale. As needed every 4-6 hours     • ascorbic acid (VITAMIN C) 1000 MG tablet Take 1,000 mg by mouth Daily.     • aspirin 81 MG EC tablet Take 1 tablet by mouth Daily. 90 tablet 2   • atorvastatin (LIPITOR) 80 MG tablet TAKE 1 TABLET DAILY 90 tablet 3   • B Complex Vitamins (VITAMIN B COMPLEX PO) Take 2 capsules by mouth Daily.     • cholecalciferol (VITAMIN D3) 25 MCG (1000 UT) tablet Take 5,000 Units by mouth Daily.     • ipratropium-albuterol (DUO-NEB) 0.5-2.5 mg/3 ml nebulizer Take 3 mL by nebulization 4 (Four) Times a Day As Needed for Wheezing. 360 mL 5   • isosorbide mononitrate (IMDUR) 120 MG 24 hr tablet Take 1 tablet by mouth Every Morning. 90 tablet 3   • Melatonin 1 MG capsule Take 1 mg by mouth Every Night. 30 each 2   • metoprolol succinate XL (TOPROL-XL) 25 MG 24 hr tablet Take 1 tablet by mouth Daily. 90 tablet 3   • nitroglycerin (NITROSTAT) 0.4 MG SL tablet Place 0.4 mg under the tongue Every 5 (Five) Minutes As Needed for Chest Pain. Take no more than 3 doses in 15 minutes.     • pantoprazole (PROTONIX) 20 MG EC tablet Take 1 tablet by mouth 2 (Two) Times a Day. 180 tablet 3   • umeclidinium-vilanterol (ANORO ELLIPTA) 62.5-25 MCG/INH aerosol powder  inhaler Inhale 1 puff Daily. 60 each 6   • sertraline (Zoloft) 25 MG tablet Take 1 tablet by mouth Daily. 30 tablet 2     No facility-administered medications prior to  visit.       Patient Active Problem List   Diagnosis   • Precordial pain   • Angina, class III (HCC)   • Cigarette nicotine dependence without complication   • Family history of premature coronary artery disease   • Heartburn   • Acute pancreatitis without infection or necrosis   • Abnormal CT of the abdomen   • Chest pain   • Chronic fatigue syndrome   • Gastro-esophageal reflux disease without esophagitis   • Chronic pancreatitis (HCC)   • Esophagitis, erosive   • Gastritis, erosive   • Essential hypertension   • Gastroesophageal reflux disease with esophagitis without hemorrhage   • Encounter for screening for malignant neoplasm of colon       Advance Care Planning:  ACP discussion was declined by the patient. Patient does not have an advance directive, declines further assistance.    Identification of Risk Factors:  Risk factors include: Obesity/Overweight .    Review of Systems    Compared to one year ago, the patient feels her physical health is the same.  Compared to one year ago, the patient feels her mental health is worse.    Objective     Physical Exam  Constitutional:       General: She is not in acute distress.     Appearance: Normal appearance. She is well-developed and well-groomed. She is not ill-appearing, toxic-appearing or diaphoretic.   HENT:      Head: Normocephalic.      Nose: Nose normal. No congestion or rhinorrhea.      Mouth/Throat:      Mouth: Mucous membranes are moist.      Pharynx: Oropharynx is clear. No oropharyngeal exudate or posterior oropharyngeal erythema.   Eyes:      General: Lids are normal.         Right eye: No discharge.         Left eye: No discharge.      Extraocular Movements: Extraocular movements intact.      Pupils: Pupils are equal, round, and reactive to light.   Neck:      Vascular: No carotid bruit.   Cardiovascular:      Rate and Rhythm: Normal rate and regular rhythm.      Pulses: Normal pulses.      Heart sounds: Normal heart sounds. No murmur heard.    No  "friction rub. No gallop.   Pulmonary:      Effort: Pulmonary effort is normal. No respiratory distress.      Breath sounds: Normal breath sounds. No stridor. No wheezing, rhonchi or rales.   Chest:      Chest wall: No tenderness.   Abdominal:      General: Bowel sounds are normal. There is no distension.      Palpations: Abdomen is soft. There is no mass.      Tenderness: There is no abdominal tenderness. There is no right CVA tenderness, left CVA tenderness, guarding or rebound.      Hernia: No hernia is present.   Musculoskeletal:         General: No swelling or tenderness. Normal range of motion.      Cervical back: Normal range of motion and neck supple. No rigidity or tenderness.      Right lower leg: No edema.      Left lower leg: No edema.   Lymphadenopathy:      Cervical: No cervical adenopathy.   Skin:     General: Skin is warm.      Capillary Refill: Capillary refill takes less than 2 seconds.      Coloration: Skin is not jaundiced.      Findings: No bruising, erythema or rash.   Neurological:      General: No focal deficit present.      Mental Status: She is alert and oriented to person, place, and time.      Motor: Motor function is intact. No weakness.      Coordination: Coordination is intact.      Gait: Gait is intact. Gait normal.   Psychiatric:         Attention and Perception: Attention normal.         Mood and Affect: Mood normal.         Speech: Speech normal.         Behavior: Behavior normal.         Cognition and Memory: Cognition normal.         Judgment: Judgment normal.         Vitals:    11/16/22 1513   BP: 120/78   BP Location: Right arm   Patient Position: Sitting   Cuff Size: Large Adult   Pulse: 58   Temp: 96.1 °F (35.6 °C)   TempSrc: Temporal   SpO2: 96%   Weight: 67.4 kg (148 lb 9.6 oz)   Height: 165.1 cm (65\")       BMI is within normal parameters. No other follow-up for BMI required.      Assessment & Plan   Patient Self-Management and Personalized Health Advice  The patient has " been provided with information about: diet, exercise and weight management and preventive services including:   · Annual Wellness Visit (AWV).    Visit Diagnoses:    ICD-10-CM ICD-9-CM   1. Medicare annual wellness visit, subsequent  Z00.00 V70.0       No orders of the defined types were placed in this encounter.      Outpatient Encounter Medications as of 11/16/2022   Medication Sig Dispense Refill   • ALBUTEROL SULFATE IN Inhale. As needed every 4-6 hours     • ascorbic acid (VITAMIN C) 1000 MG tablet Take 1,000 mg by mouth Daily.     • aspirin 81 MG EC tablet Take 1 tablet by mouth Daily. 90 tablet 2   • atorvastatin (LIPITOR) 80 MG tablet TAKE 1 TABLET DAILY 90 tablet 3   • B Complex Vitamins (VITAMIN B COMPLEX PO) Take 2 capsules by mouth Daily.     • cholecalciferol (VITAMIN D3) 25 MCG (1000 UT) tablet Take 5,000 Units by mouth Daily.     • ipratropium-albuterol (DUO-NEB) 0.5-2.5 mg/3 ml nebulizer Take 3 mL by nebulization 4 (Four) Times a Day As Needed for Wheezing. 360 mL 5   • isosorbide mononitrate (IMDUR) 120 MG 24 hr tablet Take 1 tablet by mouth Every Morning. 90 tablet 3   • Melatonin 1 MG capsule Take 1 mg by mouth Every Night. 30 each 2   • metoprolol succinate XL (TOPROL-XL) 25 MG 24 hr tablet Take 1 tablet by mouth Daily. 90 tablet 3   • nitroglycerin (NITROSTAT) 0.4 MG SL tablet Place 0.4 mg under the tongue Every 5 (Five) Minutes As Needed for Chest Pain. Take no more than 3 doses in 15 minutes.     • pantoprazole (PROTONIX) 20 MG EC tablet Take 1 tablet by mouth 2 (Two) Times a Day. 180 tablet 3   • umeclidinium-vilanterol (ANORO ELLIPTA) 62.5-25 MCG/INH aerosol powder  inhaler Inhale 1 puff Daily. 60 each 6   • [DISCONTINUED] sertraline (Zoloft) 25 MG tablet Take 1 tablet by mouth Daily. 30 tablet 2   • traZODone (DESYREL) 50 MG tablet Take 1 tablet by mouth Every Night. 30 tablet 2     No facility-administered encounter medications on file as of 11/16/2022.       Reviewed use of high risk  medication in the elderly: yes  Reviewed for potential of harmful drug interactions in the elderly: yes    Patient's Body mass index is 24.73 kg/m². indicating that she is within normal range (BMI 18.5-24.9). No BMI management plan needed..    Follow Up:  Return in about 4 weeks (around 12/14/2022), or if symptoms worsen or fail to improve.     An After Visit Summary and PPPS with all of these plans were given to the patient.             This document has been electronically signed by DEWAYNE Dunbar  November 16, 2022 16:11 EST     Part of this note may be an electronic transcription/translation of spoken language to printed text using the Dragon Dictation System.

## 2022-11-16 NOTE — PROGRESS NOTES
"Chief Complaint  COPD (Pt requests PFT)    Subjective        Itzel Hagen presents to [facility]  History of Present Illness     Ms. Hagen is a 67 year old female with a medical history significant for hypertension, COPD, GERD, and hyperlipidemia.    She presents today for follow up on COPD. She states that her breathing has been at baseline since her last visit.  She is currently taking Anoro once daily, and albuterol as needed.  She is also using duonebs as needed.  She tells me that she is now smoking about 2 ppd since her  passed in September.    Objective   Vital Signs:  /80 (BP Location: Left arm, Patient Position: Sitting)   Pulse 68   Temp 97.7 °F (36.5 °C)   Ht 165.1 cm (65\")   Wt 65.8 kg (145 lb)   SpO2 99%   BMI 24.13 kg/m²   Estimated body mass index is 24.13 kg/m² as calculated from the following:    Height as of this encounter: 165.1 cm (65\").    Weight as of this encounter: 65.8 kg (145 lb).    BMI is within normal parameters. No other follow-up for BMI required.      Physical Exam     GENERAL APPEARANCE: Well developed, well nourished, alert and cooperative, and appears to be in no acute distress.    HEAD: normocephalic. Atraumatic.    EYES: PERRL, EOMI. Vision is grossly intact.    THROAT: Oral cavity and pharynx normal. No inflammation, swelling, exudate, or lesions.     NECK: Neck supple.  No thyromegaly.    CARDIAC: Normal S1 and S2. No S3, S4 or murmurs. Rhythm is regular.     RESPIRATORY:Bilateral air entry positive. Bilateral diminished breath sounds. No wheezing, crackles or rhonchi noted.    GI: Positive bowel sounds. Soft, nondistended, nontender.     MUSCULOSKELETAL: No significant deformity or joint abnormality. No edema. Peripheral pulses intact. No varicosities.    NEUROLOGICAL: Strength and sensation symmetric and intact throughout.     PSYCHIATRIC: The mental examination revealed the patient was oriented " to person, place, and time.     Result Review :  The following data was reviewed by: DEWAYNE Trevino on 11/16/2022:  Common labs    Common Labs 3/24/22 3/24/22 3/24/22    1425 1425 1428   Glucose 108 (A)     BUN 11     Creatinine 0.82  0.80   Sodium 140     Potassium 4.0     Chloride 101     Calcium 9.7     Albumin 4.40     Total Bilirubin 0.6     Alkaline Phosphatase 180 (A)     AST (SGOT) 24     ALT (SGPT) 18     Total Cholesterol  115    Triglycerides  124    HDL Cholesterol  45    LDL Cholesterol   48    (A) Abnormal value       Comments are available for some flowsheets but are not being displayed.           Data reviewed: CT Chest          Assessment and Plan   Diagnoses and all orders for this visit:    1. Chronic obstructive pulmonary disease, unspecified COPD type (HCC) (Primary)  -     Full Pulmonary Function Test With Bronchodilator; Future    2. Cigarette nicotine dependence without complication    3. Pulmonary nodule    Other orders  -     Umeclidinium-Vilanterol (Anoro Ellipta) 62.5-25 MCG/ACT aerosol powder  inhaler; Inhale 1 puff Daily.  Dispense: 60 each; Refill: 5  -     ipratropium-albuterol (DUO-NEB) 0.5-2.5 mg/3 ml nebulizer; Take 3 mL by nebulization 4 (Four) Times a Day As Needed for Wheezing.  Dispense: 360 mL; Refill: 5  -     albuterol (PROAIR RESPICLICK) 108 (90 Base) MCG/ACT inhaler; Inhale 2 puffs Every 4 (Four) Hours As Needed for Wheezing or Shortness of Air. As needed every 4-6 hours  Dispense: 1 each; Refill: 5  -     doxycycline (VIBRAMYCIN) 100 MG capsule; Take 1 capsule by mouth 2 (Two) Times a Day.  Dispense: 20 capsule; Refill: 0  -     predniSONE (DELTASONE) 20 MG tablet; Take 2 tablets by mouth Daily.  Dispense: 10 tablet; Refill: 0           Continue anoro once daily.  Continue albuterol every 4 hours as needed.  Continue duonebs every 4 hours as needed.  Will send refills to pharmacy.        Will send in COPD rescue kit to pharmacy.      CT chest was reviewed.   Stable tiny pulmonary nodule was noted. We will repeat in one year.    Currently smoking about 2 ppd.    Itzel Hagen  reports that she has been smoking cigarettes. She started smoking about 51 years ago. She has been smoking an average of .5 packs per day. She has never used smokeless tobacco.. I have educated her on the risk of diseases from using tobacco products such as cancer, COPD and heart disease.     I advised her to quit and she is not willing to quit.            Follow Up   Return in about 6 months (around 5/16/2023).  Patient was given instructions and counseling regarding her condition or for health maintenance advice. Please see specific information pulled into the AVS if appropriate.

## 2022-11-21 ENCOUNTER — HOSPITAL ENCOUNTER (OUTPATIENT)
Dept: RESPIRATORY THERAPY | Facility: HOSPITAL | Age: 67
Discharge: HOME | End: 2022-11-21
Admitting: NURSE PRACTITIONER

## 2022-11-21 DIAGNOSIS — J44.9 CHRONIC OBSTRUCTIVE PULMONARY DISEASE, UNSPECIFIED COPD TYPE: ICD-10-CM

## 2022-11-21 PROCEDURE — 94060 EVALUATION OF WHEEZING: CPT

## 2022-11-21 PROCEDURE — 94729 DIFFUSING CAPACITY: CPT | Performed by: INTERNAL MEDICINE

## 2022-11-21 PROCEDURE — 94726 PLETHYSMOGRAPHY LUNG VOLUMES: CPT | Performed by: INTERNAL MEDICINE

## 2022-11-21 PROCEDURE — 94726 PLETHYSMOGRAPHY LUNG VOLUMES: CPT

## 2022-11-21 PROCEDURE — 94060 EVALUATION OF WHEEZING: CPT | Performed by: INTERNAL MEDICINE

## 2022-11-21 PROCEDURE — 94729 DIFFUSING CAPACITY: CPT

## 2022-11-21 PROCEDURE — 94640 AIRWAY INHALATION TREATMENT: CPT

## 2022-11-21 RX ORDER — ALBUTEROL SULFATE 2.5 MG/3ML
2.5 SOLUTION RESPIRATORY (INHALATION) ONCE
Status: COMPLETED | OUTPATIENT
Start: 2022-11-21 | End: 2022-11-21

## 2022-11-21 RX ORDER — SIMVASTATIN 20 MG
TABLET ORAL
Qty: 90 TABLET | Refills: 3 | Status: SHIPPED | OUTPATIENT
Start: 2022-11-21 | End: 2023-03-20

## 2022-11-21 RX ADMIN — ALBUTEROL SULFATE 2.5 MG: 2.5 SOLUTION RESPIRATORY (INHALATION) at 16:00

## 2022-11-23 RX ORDER — IPRATROPIUM BROMIDE AND ALBUTEROL SULFATE 2.5; .5 MG/3ML; MG/3ML
3 SOLUTION RESPIRATORY (INHALATION) 4 TIMES DAILY PRN
Qty: 360 ML | Refills: 5 | Status: SHIPPED | OUTPATIENT
Start: 2022-11-23

## 2022-11-23 RX ORDER — DOXYCYCLINE HYCLATE 100 MG/1
100 CAPSULE ORAL 2 TIMES DAILY
Qty: 20 CAPSULE | Refills: 0 | Status: SHIPPED | OUTPATIENT
Start: 2022-11-23 | End: 2022-12-14

## 2022-11-23 RX ORDER — UMECLIDINIUM BROMIDE AND VILANTEROL TRIFENATATE 62.5; 25 UG/1; UG/1
1 POWDER RESPIRATORY (INHALATION)
Qty: 60 EACH | Refills: 5 | Status: SHIPPED | OUTPATIENT
Start: 2022-11-23

## 2022-11-23 RX ORDER — PREDNISONE 20 MG/1
40 TABLET ORAL DAILY
Qty: 10 TABLET | Refills: 0 | Status: SHIPPED | OUTPATIENT
Start: 2022-11-23 | End: 2022-12-14

## 2022-12-14 ENCOUNTER — DOCUMENTATION (OUTPATIENT)
Dept: PULMONOLOGY | Facility: CLINIC | Age: 67
End: 2022-12-14

## 2022-12-14 ENCOUNTER — OFFICE VISIT (OUTPATIENT)
Dept: FAMILY MEDICINE CLINIC | Facility: CLINIC | Age: 67
End: 2022-12-14

## 2022-12-14 VITALS
SYSTOLIC BLOOD PRESSURE: 118 MMHG | WEIGHT: 149.6 LBS | TEMPERATURE: 97.5 F | BODY MASS INDEX: 24.93 KG/M2 | DIASTOLIC BLOOD PRESSURE: 64 MMHG | HEART RATE: 66 BPM | HEIGHT: 65 IN | OXYGEN SATURATION: 94 %

## 2022-12-14 DIAGNOSIS — G47.00 INSOMNIA, UNSPECIFIED TYPE: Primary | ICD-10-CM

## 2022-12-14 PROCEDURE — 99213 OFFICE O/P EST LOW 20 MIN: CPT | Performed by: FAMILY MEDICINE

## 2022-12-14 RX ORDER — TRAZODONE HYDROCHLORIDE 50 MG/1
50 TABLET ORAL NIGHTLY
Qty: 30 TABLET | Refills: 2 | Status: SHIPPED | OUTPATIENT
Start: 2022-12-14 | End: 2023-03-20 | Stop reason: SDUPTHER

## 2022-12-14 NOTE — PROGRESS NOTES
"Chief Complaint  Insomnia    Subjective          [facility] presents to [facility]  Insomnia  This is a new problem. The current episode started more than 1 month ago. The problem has been gradually improving. Associated symptoms comments: States she is doing well with trazodone at this time. She has tried sleep (trazodone) for the symptoms. The treatment provided moderate relief.       Review of Systems   Psychiatric/Behavioral: The patient has insomnia.           Objective   Vital Signs:   /64 (BP Location: Right arm, Patient Position: Sitting, Cuff Size: Adult)   Pulse 66   Temp 97.5 °F (36.4 °C) (Temporal)   Ht 165.1 cm (65\")   Wt 67.9 kg (149 lb 9.6 oz)   SpO2 94%   BMI 24.89 kg/m²     Physical Exam  Constitutional:       General: She is not in acute distress.     Appearance: Normal appearance. She is well-developed and well-groomed. She is not ill-appearing, toxic-appearing or diaphoretic.   HENT:      Head: Normocephalic.      Nose: Nose normal. No congestion or rhinorrhea.      Mouth/Throat:      Mouth: Mucous membranes are moist.      Pharynx: Oropharynx is clear. No oropharyngeal exudate or posterior oropharyngeal erythema.   Eyes:      General: Lids are normal.         Right eye: No discharge.         Left eye: No discharge.      Extraocular Movements: Extraocular movements intact.      Pupils: Pupils are equal, round, and reactive to light.   Neck:      Vascular: No carotid bruit.   Cardiovascular:      Rate and Rhythm: Normal rate and regular rhythm.      Pulses: Normal pulses.      Heart sounds: Normal heart sounds. No murmur heard.    No friction rub. No gallop.   Pulmonary:      Effort: Pulmonary effort is normal. No respiratory distress.      Breath sounds: Normal breath sounds. No stridor. No wheezing, rhonchi or rales.   Chest:      Chest wall: No tenderness.   Abdominal:      General: Bowel sounds are normal. There is no distension.      " Palpations: Abdomen is soft. There is no mass.      Tenderness: There is no abdominal tenderness. There is no right CVA tenderness, left CVA tenderness, guarding or rebound.      Hernia: No hernia is present.   Musculoskeletal:         General: No swelling or tenderness. Normal range of motion.      Cervical back: Normal range of motion and neck supple. No rigidity or tenderness.      Right lower leg: No edema.      Left lower leg: No edema.   Lymphadenopathy:      Cervical: No cervical adenopathy.   Skin:     General: Skin is warm.      Capillary Refill: Capillary refill takes less than 2 seconds.      Coloration: Skin is not jaundiced.      Findings: No bruising, erythema or rash.   Neurological:      General: No focal deficit present.      Mental Status: She is alert and oriented to person, place, and time.      Motor: Motor function is intact. No weakness.      Coordination: Coordination is intact.      Gait: Gait is intact. Gait normal.   Psychiatric:         Attention and Perception: Attention normal.         Mood and Affect: Mood normal.         Speech: Speech normal.         Behavior: Behavior normal.         Cognition and Memory: Cognition normal.         Judgment: Judgment normal.        Result Review :                 Assessment and Plan    Diagnoses and all orders for this visit:    1. Insomnia, unspecified type (Primary)  -     traZODone (DESYREL) 50 MG tablet; Take 1 tablet by mouth Every Night.  Dispense: 30 tablet; Refill: 2      Patient's Body mass index is 24.89 kg/m². indicating that she is within normal range (BMI 18.5-24.9). No BMI management plan needed..    Follow Up   Return in about 3 months (around 3/14/2023), or if symptoms worsen or fail to improve.  Patient was given instructions and counseling regarding her condition or for health maintenance advice. Please see specific information pulled into the AVS if appropriate.     This document has been electronically signed by Leonie Hdz  APRN  December 15, 2022 16:19 EST

## 2023-01-10 ENCOUNTER — TRANSCRIBE ORDERS (OUTPATIENT)
Dept: PULMONOLOGY | Facility: CLINIC | Age: 68
End: 2023-01-10
Payer: MEDICARE

## 2023-01-10 DIAGNOSIS — J44.9 CHRONIC OBSTRUCTIVE PULMONARY DISEASE, UNSPECIFIED COPD TYPE: Primary | ICD-10-CM

## 2023-01-10 DIAGNOSIS — J43.2 CENTRILOBULAR EMPHYSEMA: ICD-10-CM

## 2023-01-19 DIAGNOSIS — K21.9 GASTROESOPHAGEAL REFLUX DISEASE, UNSPECIFIED WHETHER ESOPHAGITIS PRESENT: ICD-10-CM

## 2023-01-19 RX ORDER — PANTOPRAZOLE SODIUM 40 MG/1
TABLET, DELAYED RELEASE ORAL
Qty: 30 TABLET | Refills: 11 | OUTPATIENT
Start: 2023-01-19

## 2023-03-20 ENCOUNTER — OFFICE VISIT (OUTPATIENT)
Dept: FAMILY MEDICINE CLINIC | Facility: CLINIC | Age: 68
End: 2023-03-20
Payer: MEDICARE

## 2023-03-20 VITALS
BODY MASS INDEX: 24.22 KG/M2 | DIASTOLIC BLOOD PRESSURE: 80 MMHG | TEMPERATURE: 97.5 F | SYSTOLIC BLOOD PRESSURE: 118 MMHG | WEIGHT: 145.4 LBS | OXYGEN SATURATION: 96 % | HEART RATE: 69 BPM | HEIGHT: 65 IN

## 2023-03-20 DIAGNOSIS — G47.00 INSOMNIA, UNSPECIFIED TYPE: ICD-10-CM

## 2023-03-20 DIAGNOSIS — R73.01 IMPAIRED FASTING GLUCOSE: ICD-10-CM

## 2023-03-20 DIAGNOSIS — I10 ESSENTIAL HYPERTENSION: Primary | ICD-10-CM

## 2023-03-20 PROCEDURE — 1160F RVW MEDS BY RX/DR IN RCRD: CPT | Performed by: FAMILY MEDICINE

## 2023-03-20 PROCEDURE — 3074F SYST BP LT 130 MM HG: CPT | Performed by: FAMILY MEDICINE

## 2023-03-20 PROCEDURE — 3079F DIAST BP 80-89 MM HG: CPT | Performed by: FAMILY MEDICINE

## 2023-03-20 PROCEDURE — 99214 OFFICE O/P EST MOD 30 MIN: CPT | Performed by: FAMILY MEDICINE

## 2023-03-20 PROCEDURE — 1159F MED LIST DOCD IN RCRD: CPT | Performed by: FAMILY MEDICINE

## 2023-03-20 RX ORDER — TRAZODONE HYDROCHLORIDE 50 MG/1
50 TABLET ORAL NIGHTLY
Qty: 30 TABLET | Refills: 2 | Status: SHIPPED | OUTPATIENT
Start: 2023-03-20

## 2023-03-20 RX ORDER — ATORVASTATIN CALCIUM 80 MG/1
80 TABLET, FILM COATED ORAL DAILY
Qty: 90 TABLET | Refills: 3 | Status: SHIPPED | OUTPATIENT
Start: 2023-03-20

## 2023-03-21 NOTE — PROGRESS NOTES
"Chief Complaint  Insomnia    Subjective          Itzel Hagen presents to [facility]  History of Present Illness    Anxiety  Presents for initial visit. Onset was 1 to 6 months ago. The problem has been unchanged. Symptoms include decreased concentration, depressed mood, excessive worry, insomnia, nervous/anxious behavior, panic and restlessness. Patient reports no suicidal ideas. Symptoms occur constantly. The severity of symptoms is moderate.     Risk factors include a major life event ( recently passed away and father is in poor health). There is no history of suicide attempts. Past treatments include nothing.     Insomnia  This is a new problem. The current episode started more than 1 month ago. The problem has been gradually improving. Associated symptoms comments: States she is doing well with trazodone at this time. She has tried sleep (trazodone) for the symptoms. The treatment provided moderate relief.     Review of Systems      Objective   Vital Signs:   /80 (BP Location: Right arm, Patient Position: Sitting, Cuff Size: Large Adult)   Pulse 69   Temp 97.5 °F (36.4 °C) (Temporal)   Ht 165.1 cm (65\")   Wt 66 kg (145 lb 6.4 oz)   SpO2 96%   BMI 24.20 kg/m²     Physical Exam  Constitutional:       General: She is not in acute distress.     Appearance: Normal appearance. She is well-developed and well-groomed. She is not ill-appearing, toxic-appearing or diaphoretic.   HENT:      Head: Normocephalic.      Nose: Nose normal. No congestion or rhinorrhea.      Mouth/Throat:      Mouth: Mucous membranes are moist.      Pharynx: Oropharynx is clear. No oropharyngeal exudate or posterior oropharyngeal erythema.   Eyes:      General: Lids are normal.         Right eye: No discharge.         Left eye: No discharge.      Extraocular Movements: Extraocular movements intact.      Pupils: Pupils are equal, round, and reactive to light.   Neck:      Vascular: No " carotid bruit.   Cardiovascular:      Rate and Rhythm: Normal rate and regular rhythm.      Pulses: Normal pulses.      Heart sounds: Normal heart sounds. No murmur heard.    No friction rub. No gallop.   Pulmonary:      Effort: Pulmonary effort is normal. No respiratory distress.      Breath sounds: Normal breath sounds. No stridor. No wheezing, rhonchi or rales.   Chest:      Chest wall: No tenderness.   Abdominal:      General: Bowel sounds are normal. There is no distension.      Palpations: Abdomen is soft. There is no mass.      Tenderness: There is no abdominal tenderness. There is no right CVA tenderness, left CVA tenderness, guarding or rebound.      Hernia: No hernia is present.   Musculoskeletal:         General: No swelling or tenderness. Normal range of motion.      Cervical back: Normal range of motion and neck supple. No rigidity or tenderness.      Right lower leg: No edema.      Left lower leg: No edema.   Lymphadenopathy:      Cervical: No cervical adenopathy.   Skin:     General: Skin is warm.      Capillary Refill: Capillary refill takes less than 2 seconds.      Coloration: Skin is not jaundiced.      Findings: No bruising, erythema or rash.   Neurological:      General: No focal deficit present.      Mental Status: She is alert and oriented to person, place, and time.      Motor: Motor function is intact. No weakness.      Coordination: Coordination is intact.      Gait: Gait is intact. Gait normal.   Psychiatric:         Attention and Perception: Attention normal.         Mood and Affect: Mood normal.         Speech: Speech normal.         Behavior: Behavior normal.         Cognition and Memory: Cognition normal.         Judgment: Judgment normal.        Result Review :                 Assessment and Plan    Diagnoses and all orders for this visit:    1. Essential hypertension (Primary)  -     CBC Auto Differential; Future  -     Comprehensive Metabolic Panel; Future  -     Hemoglobin A1c;  Future  -     Lipid Panel; Future  -     TSH; Future  -     T4, Free; Future    2. Impaired fasting glucose  -     Hemoglobin A1c; Future    3. Insomnia, unspecified type  -     traZODone (DESYREL) 50 MG tablet; Take 1 tablet by mouth Every Night.  Dispense: 30 tablet; Refill: 2    Other orders  -     atorvastatin (LIPITOR) 80 MG tablet; Take 1 tablet by mouth Daily.  Dispense: 90 tablet; Refill: 3      Patient's Body mass index is 24.2 kg/m². indicating that she is within normal range (BMI 18.5-24.9). No BMI management plan needed..    Follow Up   Return in about 3 months (around 6/20/2023), or labs today.  Patient was given instructions and counseling regarding her condition or for health maintenance advice. Please see specific information pulled into the AVS if appropriate.     This document has been electronically signed by DEWAYNE Dunbar  March 21, 2023 10:03 EDT

## 2023-04-03 ENCOUNTER — TELEPHONE (OUTPATIENT)
Dept: FAMILY MEDICINE CLINIC | Facility: CLINIC | Age: 68
End: 2023-04-03
Payer: MEDICARE

## 2023-04-03 NOTE — TELEPHONE ENCOUNTER
Attempted to contact the pt in regards to overdue lab work. The pt did not answer. I left a message and will attempted to contact again at a later date.    HUB to read.

## 2023-04-05 NOTE — TELEPHONE ENCOUNTER
Attempted to contact the pt again in regards to labs but she did not answer. I will send out a letter.

## 2023-04-27 DIAGNOSIS — K21.00 GASTROESOPHAGEAL REFLUX DISEASE WITH ESOPHAGITIS WITHOUT HEMORRHAGE: ICD-10-CM

## 2023-04-27 RX ORDER — PANTOPRAZOLE SODIUM 20 MG/1
20 TABLET, DELAYED RELEASE ORAL 2 TIMES DAILY
Qty: 180 TABLET | Refills: 0 | Status: SHIPPED | OUTPATIENT
Start: 2023-04-27

## 2023-04-27 NOTE — TELEPHONE ENCOUNTER
Patient called today. She is needing refill on Pantoprazole, her last office visit was 4/26/22 and next office visit is scheduled on 8/3/23. She would like sent to [facility] in [place] because it is cheaper there

## 2023-05-09 RX ORDER — METOPROLOL SUCCINATE 25 MG/1
25 TABLET, EXTENDED RELEASE ORAL DAILY
Qty: 90 TABLET | Refills: 0 | Status: SHIPPED | OUTPATIENT
Start: 2023-05-09

## 2023-05-18 ENCOUNTER — OFFICE VISIT (OUTPATIENT)
Dept: FAMILY MEDICINE CLINIC | Facility: CLINIC | Age: 68
End: 2023-05-18
Payer: MEDICARE

## 2023-05-18 VITALS
OXYGEN SATURATION: 96 % | BODY MASS INDEX: 23.32 KG/M2 | WEIGHT: 140 LBS | TEMPERATURE: 97.8 F | SYSTOLIC BLOOD PRESSURE: 130 MMHG | DIASTOLIC BLOOD PRESSURE: 70 MMHG | HEIGHT: 65 IN | HEART RATE: 93 BPM

## 2023-05-18 DIAGNOSIS — S82.892A CLOSED FRACTURE OF LEFT ANKLE, INITIAL ENCOUNTER: Primary | ICD-10-CM

## 2023-05-24 NOTE — PROGRESS NOTES
"Chief Complaint  Pain (ankle)    Subjective          [facility] to [facility]  Pain  This is a new problem. The current episode started in the past 7 days. The problem has been unchanged. Associated symptoms include arthralgias, joint swelling and myalgias. She has tried NSAIDs, acetaminophen, ice, heat, rest and immobilization for the symptoms. The treatment provided mild relief.   Was seen by urgent care and was given a boot and was told to follow up with pcp in 2 weeks. Will refer to ortho.     Review of Systems   Musculoskeletal: Positive for arthralgias, joint swelling and myalgias.         Objective   Vital Signs:   /70 (BP Location: Right arm, Patient Position: Sitting, Cuff Size: Adult)   Pulse 93   Temp 97.8 °F (36.6 °C) (Temporal)   Ht 165.1 cm (65\")   Wt 63.5 kg (140 lb)   SpO2 96%   BMI 23.30 kg/m²     Physical Exam  Constitutional:       General: She is not in acute distress.     Appearance: Normal appearance. She is well-developed and well-groomed. She is not ill-appearing, toxic-appearing or diaphoretic.   HENT:      Head: Normocephalic.      Nose: Nose normal. No congestion or rhinorrhea.      Mouth/Throat:      Mouth: Mucous membranes are moist.      Pharynx: Oropharynx is clear. No oropharyngeal exudate or posterior oropharyngeal erythema.   Eyes:      General: Lids are normal.         Right eye: No discharge.         Left eye: No discharge.      Extraocular Movements: Extraocular movements intact.      Pupils: Pupils are equal, round, and reactive to light.   Neck:      Vascular: No carotid bruit.   Cardiovascular:      Rate and Rhythm: Normal rate and regular rhythm.      Pulses: Normal pulses.      Heart sounds: Normal heart sounds. No murmur heard.    No friction rub. No gallop.   Pulmonary:      Effort: Pulmonary effort is normal. No respiratory distress.      Breath sounds: Normal breath sounds. No stridor. No wheezing, rhonchi or " rales.   Chest:      Chest wall: No tenderness.   Abdominal:      General: Bowel sounds are normal. There is no distension.      Palpations: Abdomen is soft. There is no mass.      Tenderness: There is no abdominal tenderness. There is no right CVA tenderness, left CVA tenderness, guarding or rebound.      Hernia: No hernia is present.   Musculoskeletal:         General: No swelling.      Cervical back: Normal range of motion and neck supple. No rigidity or tenderness.      Right lower leg: No edema.      Left lower leg: No edema.      Left ankle: Swelling present. Tenderness present. Decreased range of motion.   Lymphadenopathy:      Cervical: No cervical adenopathy.   Skin:     General: Skin is warm.      Capillary Refill: Capillary refill takes less than 2 seconds.      Coloration: Skin is not jaundiced.      Findings: No bruising, erythema or rash.   Neurological:      General: No focal deficit present.      Mental Status: She is alert and oriented to person, place, and time.      Motor: Motor function is intact. No weakness.      Coordination: Coordination is intact.      Gait: Gait is intact. Gait normal.   Psychiatric:         Attention and Perception: Attention normal.         Mood and Affect: Mood normal.         Speech: Speech normal.         Behavior: Behavior normal.         Cognition and Memory: Cognition normal.         Judgment: Judgment normal.        Result Review :                 Assessment and Plan    Diagnoses and all orders for this visit:    1. Closed fracture of left ankle, initial encounter (Primary)  -     Ambulatory Referral to Orthopedic Surgery      Patient's Body mass index is 23.3 kg/m². indicating that she is within normal range (BMI 18.5-24.9). No BMI management plan needed..    Follow Up   No follow-ups on file.  Patient was given instructions and counseling regarding her condition or for health maintenance advice. Please see specific information pulled into the AVS if appropriate.      This document has been electronically signed by DEWAYNE Dunbar  May 24, 2023 12:48 EDT

## 2023-05-25 ENCOUNTER — TELEPHONE (OUTPATIENT)
Dept: FAMILY MEDICINE CLINIC | Facility: CLINIC | Age: 68
End: 2023-05-25
Payer: MEDICARE

## 2023-05-25 RX ORDER — ISOSORBIDE MONONITRATE 120 MG/1
120 TABLET, EXTENDED RELEASE ORAL EVERY MORNING
Qty: 90 TABLET | Refills: 3 | Status: SHIPPED | OUTPATIENT
Start: 2023-05-25

## 2023-05-25 NOTE — TELEPHONE ENCOUNTER
Caller: Itzel Hagen    Relationship: Self    Best call back number: [phone] TO LEAVE DETAILED MESSAGE IF NO ANSWER    What form or medical record are you requesting: SHORT TERM DISABILITY AND FMLA    Who is requesting this form or medical record from you: [employer]- THE PAPERWORK [employer]    How would you like to receive the form or medical records (pick-up, mail, fax): FOLLOW INSTRUCTIONS ON PAPERWORK- THERE IS AN E-MAIL TO SEND THESE TO THAT WAS LISTED ON THE PAPERWORK.    Timeframe paperwork needed: PAST DUE- PLEASE SUBMIT AS SOON AS POSSIBLE. DUE DATE LISTED ON PAPERWORK    Additional notes: PATIENT CHECKING STATUS OF PAPERWORK THAT WAS DROPPED OFF ON 5/19/23. PLEASE CALL TO UPDATE PATIENT ON STATUS OF PAPERWORK.

## 2023-05-25 NOTE — TELEPHONE ENCOUNTER
I just received this yesterday, I will be working on when I get time. It is not ready yet    Patient notified.

## 2023-05-25 NOTE — TELEPHONE ENCOUNTER
Caller: Itzel Hagen    Relationship: Self    Best call back number: [phone]    Requested Prescriptions:   Requested Prescriptions     Pending Prescriptions Disp Refills   • isosorbide mononitrate (IMDUR) 120 MG 24 hr tablet 90 tablet 3     Sig: Take 1 tablet by mouth Every Morning.        Pharmacy where request should be sent: [facility]/PHARMACY #[id number] - [address] [phone] [facility] [phone]      Last office visit with prescribing clinician: 10/26/2022   Last telemedicine visit with prescribing clinician: Visit date not found   Next office visit with prescribing clinician: 6/29/2023     Additional details provided by patient: PT WILL BE TAKING HER LAST PILL TODAY.    Does the patient have less than a 3 day supply:  [x] Yes  [] No    Would you like a call back once the refill request has been completed: [x] Yes [] No    If the office needs to give you a call back, can they leave a voicemail: [x] Yes [] No    Meg Collins Rep   05/25/23 09:07 EDT

## 2023-06-01 NOTE — TELEPHONE ENCOUNTER
PHONE CALL FROM [employer] @ [employer].  THEY ARE LOOKING FOR [place] PAPERWORK AND SHORT TERM DISABILITY FOR [employer].  WHAT IS THE STATUS?    PLEASE CALL [phone] [phone]

## 2023-06-05 RX ORDER — ISOSORBIDE MONONITRATE 120 MG/1
TABLET, EXTENDED RELEASE ORAL
Qty: 90 TABLET | Refills: 3 | OUTPATIENT
Start: 2023-06-05

## 2023-06-06 ENCOUNTER — TELEPHONE (OUTPATIENT)
Dept: FAMILY MEDICINE CLINIC | Facility: CLINIC | Age: 68
End: 2023-06-06
Payer: MEDICARE

## 2023-06-06 NOTE — TELEPHONE ENCOUNTER
Received a call from [employer] regarding [facility] Paperwork,refaxed to a different number provided by company.

## 2023-06-29 PROCEDURE — 93246 EXT ECG>7D<15D RECORDING: CPT | Performed by: INTERNAL MEDICINE

## 2023-07-24 DIAGNOSIS — K21.00 GASTROESOPHAGEAL REFLUX DISEASE WITH ESOPHAGITIS WITHOUT HEMORRHAGE: ICD-10-CM

## 2023-07-24 RX ORDER — PANTOPRAZOLE SODIUM 20 MG/1
20 TABLET, DELAYED RELEASE ORAL 2 TIMES DAILY
Qty: 60 TABLET | Refills: 0 | Status: SHIPPED | OUTPATIENT
Start: 2023-07-24

## 2023-07-31 ENCOUNTER — TREATMENT (OUTPATIENT)
Dept: CARDIOLOGY | Facility: CLINIC | Age: 68
End: 2023-07-31
Payer: MEDICARE

## 2023-07-31 DIAGNOSIS — R00.2 PALPITATIONS: ICD-10-CM

## 2023-07-31 PROCEDURE — 93248 EXT ECG>7D<15D REV&INTERPJ: CPT | Performed by: INTERNAL MEDICINE

## 2023-09-25 ENCOUNTER — HOSPITAL ENCOUNTER (OUTPATIENT)
Dept: CARDIOLOGY | Facility: HOSPITAL | Age: 68
Discharge: HOME | End: 2023-09-25
Payer: MEDICARE

## 2023-09-25 ENCOUNTER — HOSPITAL ENCOUNTER (OUTPATIENT)
Dept: CT IMAGING | Facility: HOSPITAL | Age: 68
Discharge: HOME | End: 2023-09-25
Payer: MEDICARE

## 2023-09-25 DIAGNOSIS — Z12.2 ENCOUNTER FOR SCREENING FOR LUNG CANCER: ICD-10-CM

## 2023-09-25 DIAGNOSIS — R42 DIZZY: ICD-10-CM

## 2023-09-25 DIAGNOSIS — F17.210 CIGARETTE NICOTINE DEPENDENCE WITHOUT COMPLICATION: ICD-10-CM

## 2023-09-25 PROCEDURE — 71271 CT THORAX LUNG CANCER SCR C-: CPT

## 2023-09-25 PROCEDURE — 71271 CT THORAX LUNG CANCER SCR C-: CPT | Performed by: RADIOLOGY

## 2023-09-25 PROCEDURE — 93880 EXTRACRANIAL BILAT STUDY: CPT | Performed by: RADIOLOGY

## 2023-09-25 PROCEDURE — 93880 EXTRACRANIAL BILAT STUDY: CPT

## 2023-09-27 DIAGNOSIS — K21.00 GASTROESOPHAGEAL REFLUX DISEASE WITH ESOPHAGITIS WITHOUT HEMORRHAGE: ICD-10-CM

## 2023-09-27 RX ORDER — PANTOPRAZOLE SODIUM 20 MG/1
20 TABLET, DELAYED RELEASE ORAL 2 TIMES DAILY
Qty: 60 TABLET | Refills: 0 | Status: SHIPPED | OUTPATIENT
Start: 2023-09-27

## 2023-09-27 NOTE — TELEPHONE ENCOUNTER
Caller: Itzel Hagen    Relationship: Self    Best call back number: [phone]    Requested Prescriptions:   Requested Prescriptions     Signed Prescriptions Disp Refills    pantoprazole (PROTONIX) 20 MG EC tablet 60 tablet 0     Sig: Take 1 tablet by mouth 2 (Two) Times a Day. No further refills until seen in office.     Authorizing Provider: FABIANO VALENTINE        Pharmacy where request should be sent: [facility]/PHARMACY #[id number] - [address] [phone] [facility] [phone]      Last office visit with prescribing clinician: 4/26/2022   Last telemedicine visit with prescribing clinician: Visit date not found   Next office visit with prescribing clinician: 11/20/2023     Additional details provided by patient:     Does the patient have less than a 3 day supply:  [x] Yes  [] No    Would you like a call back once the refill request has been completed: [x] Yes [] No    If the office needs to give you a call back, can they leave a voicemail: [x] Yes [] No    DEWAYNE Kim   09/27/23 15:27 EDT

## 2023-09-28 ENCOUNTER — OFFICE VISIT (OUTPATIENT)
Dept: CARDIOLOGY | Facility: CLINIC | Age: 68
End: 2023-09-28
Payer: MEDICARE

## 2023-09-28 VITALS
BODY MASS INDEX: 24.56 KG/M2 | HEART RATE: 58 BPM | OXYGEN SATURATION: 98 % | WEIGHT: 147.4 LBS | HEIGHT: 65 IN | DIASTOLIC BLOOD PRESSURE: 63 MMHG | SYSTOLIC BLOOD PRESSURE: 131 MMHG

## 2023-09-28 DIAGNOSIS — R07.2 PRECORDIAL PAIN: ICD-10-CM

## 2023-09-28 DIAGNOSIS — I10 ESSENTIAL HYPERTENSION: Primary | ICD-10-CM

## 2023-09-28 PROCEDURE — 3075F SYST BP GE 130 - 139MM HG: CPT | Performed by: PHYSICIAN ASSISTANT

## 2023-09-28 PROCEDURE — 1160F RVW MEDS BY RX/DR IN RCRD: CPT | Performed by: PHYSICIAN ASSISTANT

## 2023-09-28 PROCEDURE — 99213 OFFICE O/P EST LOW 20 MIN: CPT | Performed by: PHYSICIAN ASSISTANT

## 2023-09-28 PROCEDURE — 3078F DIAST BP <80 MM HG: CPT | Performed by: PHYSICIAN ASSISTANT

## 2023-09-28 PROCEDURE — 1159F MED LIST DOCD IN RCRD: CPT | Performed by: PHYSICIAN ASSISTANT

## 2023-09-28 NOTE — PROGRESS NOTES
Leonie Hdz APRN  Itzel Hagen  1955 09/28/2023    Patient Active Problem List   Diagnosis    Precordial pain    Angina, class III    Cigarette nicotine dependence without complication    Family history of premature coronary artery disease    Heartburn    Acute pancreatitis without infection or necrosis    Abnormal CT of the abdomen    Chest pain    Chronic fatigue syndrome    Gastro-esophageal reflux disease without esophagitis    Chronic pancreatitis    Esophagitis, erosive    Gastritis, erosive    Essential hypertension    Gastroesophageal reflux disease with esophagitis without hemorrhage    Encounter for screening for malignant neoplasm of colon       Dear Leonie Hdz APRN:    Subjective     History of Present Illness:    Chief Complaint   Patient presents with    Results     Carotid doppler and holter       Itzel Hagen is a pleasant 67 y.o. female with a past medical history significant for nonobstructive coronary artery disease with 20% stenosis in the RCA, tobacco abuse, family history of coronary artery disease with mother having CABG in a brother received stents in their 40s.  She does not have history of diabetes mellitus, however, does smoke tobacco. She comes in today for cardiology follow-up.      Itzel did have carotid ultrasound done as well as Holter monitor.  Carotid ultrasound revealed mild carotid artery disease and Holter monitor revealed normal sinus rhythm with occasional episodes of SVT.  Clinically only has complaints of neck pain that she describes as being worse when she has movement/range of motion.  Denying being under reproduce with physical exertion.  She also denies worsening shortness of breath worsening from baseline.  She did have stress test that was indeterminate.    Allergies   Allergen Reactions    Penicillins Anaphylaxis    Coconut Swelling and Rash     Tongue swells    Codeine Rash    Morphine Swelling   :      Current Outpatient Medications:      albuterol (PROAIR RESPICLICK) 108 (90 Base) MCG/ACT inhaler, Inhale 2 puffs Every 4 (Four) Hours As Needed for Wheezing or Shortness of Air. As needed every 4-6 hours, Disp: 1 each, Rfl: 5    ascorbic acid (VITAMIN C) 1000 MG tablet, Take 1 tablet by mouth Daily., Disp: , Rfl:     aspirin 81 MG EC tablet, Take 1 tablet by mouth Daily., Disp: 90 tablet, Rfl: 2    atorvastatin (LIPITOR) 80 MG tablet, Take 1 tablet by mouth Daily., Disp: 90 tablet, Rfl: 3    B Complex Vitamins (VITAMIN B COMPLEX PO), Take 2 capsules by mouth Daily., Disp: , Rfl:     buPROPion (Zyban) 150 MG 12 hr tablet, Take 150 mg by mouth 2 (Two) Times a Day., Disp: 60 tablet, Rfl: 5    cholecalciferol (VITAMIN D3) 25 MCG (1000 UT) tablet, Take 5 tablets by mouth Daily., Disp: , Rfl:     ipratropium-albuterol (DUO-NEB) 0.5-2.5 mg/3 ml nebulizer, Take 3 mL by nebulization 4 (Four) Times a Day As Needed for Wheezing., Disp: 360 mL, Rfl: 5    isosorbide mononitrate (IMDUR) 120 MG 24 hr tablet, Take 1 tablet by mouth Every Morning., Disp: 90 tablet, Rfl: 3    metoprolol succinate XL (TOPROL-XL) 25 MG 24 hr tablet, Take 1 tablet by mouth Daily., Disp: 90 tablet, Rfl: 3    nicotine (NICODERM CQ) 21 MG/24HR patch, Place 1 patch on the skin as directed by provider Daily., Disp: 28 each, Rfl: 1    nitroglycerin (NITROSTAT) 0.4 MG SL tablet, Place 1 tablet under the tongue Every 5 (Five) Minutes As Needed for Chest Pain. Take no more than 3 doses in 15 minutes., Disp: , Rfl:     pantoprazole (PROTONIX) 20 MG EC tablet, Take 1 tablet by mouth 2 (Two) Times a Day. No further refills until seen in office., Disp: 60 tablet, Rfl: 0    Umeclidinium-Vilanterol (Anoro Ellipta) 62.5-25 MCG/ACT aerosol powder  inhaler, Inhale 1 puff Daily., Disp: 60 each, Rfl: 5    doxycycline (VIBRAMYCIN) 100 MG capsule, Take 1 capsule by mouth 2 (Two) Times a Day. (Patient not taking: Reported on 6/29/2023), Disp: 20 capsule, Rfl: 0    predniSONE (DELTASONE) 20 MG tablet, Take 2  "tablets by mouth Daily. (Patient not taking: Reported on 6/29/2023), Disp: 10 tablet, Rfl: 0    traZODone (DESYREL) 50 MG tablet, Take 1 tablet by mouth Every Night. (Patient not taking: Reported on 6/29/2023), Disp: 30 tablet, Rfl: 2    The following portions of the patient's history were reviewed and updated as appropriate: allergies, current medications, past family history, past medical history, past social history, past surgical history and problem list.    Social History     Tobacco Use    Smoking status: Every Day     Packs/day: 1.00     Types: Cigarettes     Start date: 1971     Passive exposure: Current    Smokeless tobacco: Never   Vaping Use    Vaping Use: Never used   Substance Use Topics    Alcohol use: No     Comment: \"not in 30 years\"    Drug use: No         Objective   Vitals:    09/28/23 1404   BP: 131/63   Pulse: 58   SpO2: 98%   Weight: 66.9 kg (147 lb 6.4 oz)   Height: 165.1 cm (65\")     Body mass index is 24.53 kg/m².    Constitutional:       General: Not in acute distress.     Appearance: Healthy appearance. Well-developed and not in distress. Not diaphoretic.   Eyes:      Conjunctiva/sclera: Conjunctivae normal.      Pupils: Pupils are equal, round, and reactive to light.   HENT:      Head: Normocephalic and atraumatic.   Neck:      Vascular: No carotid bruit or JVD.   Pulmonary:      Effort: Pulmonary effort is normal. No respiratory distress.      Breath sounds: Normal breath sounds.   Cardiovascular:      Normal rate. Regular rhythm.   Edema:     Peripheral edema absent.   Skin:     General: Skin is cool.   Neurological:      Mental Status: Alert, oriented to person, place, and time and oriented to person, place and time.       Lab Results   Component Value Date     03/24/2022    K 4.0 03/24/2022     03/24/2022    CO2 28.0 03/24/2022    BUN 11 03/24/2022    CREATININE 0.80 03/24/2022    GLUCOSE 108 (H) 03/24/2022    CALCIUM 9.7 03/24/2022    AST 24 03/24/2022    ALT 18 " 03/24/2022    ALKPHOS 180 (H) 03/24/2022     No results found for: CKTOTAL  Lab Results   Component Value Date    WBC 14.69 (H) 11/13/2021    HGB 14.0 11/13/2021    HCT 41.6 11/13/2021     11/13/2021     Lab Results   Component Value Date    INR 0.99 11/13/2021     Lab Results   Component Value Date    MG 1.5 (L) 11/13/2021     Lab Results   Component Value Date    TRIG 124 03/24/2022    HDL 45 03/24/2022    LDL 48 03/24/2022      No results found for: BNP    During this visit the following were done:  Labs Reviewed []    Labs Ordered []    Radiology Reports Reviewed []    Radiology Ordered []    PCP Records Reviewed []    Referring Provider Records Reviewed []    ER Records Reviewed []    Hospital Records Reviewed []    History Obtained From Family []    Radiology Images Reviewed []    Other Reviewed []    Records Requested []       Procedures    Assessment & Plan    Diagnosis Plan   1. Essential hypertension        2. Precordial pain                 Recommendations:  Neck pain/possible anginal equivalent  Seems more suspicious for musculoskeletal.  I did discuss options such as performing nuclear stress test however overall suspicion is fairly low given nonobstructive disease seen on left heart catheterization in 2019 and now indeterminate treadmill stress test.  After shared decision making, agreed to see PCP for noncardiac work-up for neck pain.  For now continue aspirin, Lipitor, metoprolol, Imdur  Essential hypertension  Well-controlled.      Return in about 3 months (around 12/28/2023).    As always, I appreciate very much the opportunity to participate in the cardiovascular care of your patients.      With Best Regards,    Sebastian Fuchs PA-C

## 2023-10-04 ENCOUNTER — TELEPHONE (OUTPATIENT)
Dept: PULMONOLOGY | Facility: CLINIC | Age: 68
End: 2023-10-04
Payer: MEDICARE

## 2023-10-04 ENCOUNTER — DOCUMENTATION (OUTPATIENT)
Dept: PULMONOLOGY | Facility: CLINIC | Age: 68
End: 2023-10-04
Payer: MEDICARE

## 2023-10-04 NOTE — TELEPHONE ENCOUNTER
----- Message from DEWAYNE Trveino sent at 10/4/2023  3:58 PM EDT -----  Will you let her know that her CT Chest showed that her pulmonary nodules are stable in size.  We will plan to repeat her CT Chest in one year.     ----- Message -----  From: [email or web address], Rad Results [facility] In  Sent: 9/25/2023   3:24 PM EDT  To: DEWAYNE Trevino

## 2023-10-04 NOTE — PROGRESS NOTES
CT chest shows muliple tiny nodules that are stable.  Stable COPD is noted.  Lung Rads Category 2.  We will repeat CT Chest in one year.

## 2023-11-09 ENCOUNTER — TELEPHONE (OUTPATIENT)
Dept: FAMILY MEDICINE CLINIC | Facility: CLINIC | Age: 68
End: 2023-11-09
Payer: MEDICARE

## 2023-11-09 NOTE — TELEPHONE ENCOUNTER
Called patient to make aware pneumo vaccine is due. No answer unable to leave message at this time.

## 2023-11-16 DIAGNOSIS — K21.00 GASTROESOPHAGEAL REFLUX DISEASE WITH ESOPHAGITIS WITHOUT HEMORRHAGE: ICD-10-CM

## 2023-11-20 DIAGNOSIS — K21.00 GASTROESOPHAGEAL REFLUX DISEASE WITH ESOPHAGITIS WITHOUT HEMORRHAGE: ICD-10-CM

## 2023-11-20 RX ORDER — PANTOPRAZOLE SODIUM 20 MG/1
20 TABLET, DELAYED RELEASE ORAL 2 TIMES DAILY
Qty: 60 TABLET | Refills: 0 | OUTPATIENT
Start: 2023-11-20

## 2023-11-20 NOTE — TELEPHONE ENCOUNTER
Last office visit April 2022 - needs office visit for further refills. May contact PCP for refills in interim.

## 2023-11-20 NOTE — TELEPHONE ENCOUNTER
Caller: Itzel Hagen    Relationship: Self    Best call back number: [phone]     Requested Prescriptions:   Requested Prescriptions     Pending Prescriptions Disp Refills    pantoprazole (PROTONIX) 20 MG EC tablet 60 tablet 0     Sig: Take 1 tablet by mouth 2 (Two) Times a Day. No further refills until seen in office.        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     Does the patient have less than a 3 day supply:  [] Yes  [x] No    Would you like a call back once the refill request has been completed: [] Yes [] No    If the office needs to give you a call back, can they leave a voicemail: [x] Yes [] No    Meg Issa Rep   11/20/23 14:30 EST

## 2023-11-21 ENCOUNTER — OFFICE VISIT (OUTPATIENT)
Dept: FAMILY MEDICINE CLINIC | Facility: CLINIC | Age: 68
End: 2023-11-21
Payer: MEDICARE

## 2023-11-21 VITALS
SYSTOLIC BLOOD PRESSURE: 138 MMHG | BODY MASS INDEX: 24.46 KG/M2 | DIASTOLIC BLOOD PRESSURE: 82 MMHG | OXYGEN SATURATION: 95 % | TEMPERATURE: 97.8 F | HEART RATE: 69 BPM | WEIGHT: 146.8 LBS | HEIGHT: 65 IN

## 2023-11-21 DIAGNOSIS — K21.00 GASTROESOPHAGEAL REFLUX DISEASE WITH ESOPHAGITIS WITHOUT HEMORRHAGE: ICD-10-CM

## 2023-11-21 DIAGNOSIS — M54.2 NECK PAIN: Primary | ICD-10-CM

## 2023-11-21 DIAGNOSIS — M54.50 CHRONIC BILATERAL LOW BACK PAIN WITHOUT SCIATICA: ICD-10-CM

## 2023-11-21 DIAGNOSIS — G89.29 CHRONIC BILATERAL LOW BACK PAIN WITHOUT SCIATICA: ICD-10-CM

## 2023-11-21 PROCEDURE — 3075F SYST BP GE 130 - 139MM HG: CPT | Performed by: FAMILY MEDICINE

## 2023-11-21 PROCEDURE — 3079F DIAST BP 80-89 MM HG: CPT | Performed by: FAMILY MEDICINE

## 2023-11-21 PROCEDURE — 1159F MED LIST DOCD IN RCRD: CPT | Performed by: FAMILY MEDICINE

## 2023-11-21 PROCEDURE — 1160F RVW MEDS BY RX/DR IN RCRD: CPT | Performed by: FAMILY MEDICINE

## 2023-11-21 PROCEDURE — 99214 OFFICE O/P EST MOD 30 MIN: CPT | Performed by: FAMILY MEDICINE

## 2023-11-21 RX ORDER — PANTOPRAZOLE SODIUM 20 MG/1
20 TABLET, DELAYED RELEASE ORAL 2 TIMES DAILY
Qty: 180 TABLET | Refills: 0 | Status: SHIPPED | OUTPATIENT
Start: 2023-11-21

## 2023-11-27 ENCOUNTER — TELEPHONE (OUTPATIENT)
Dept: FAMILY MEDICINE CLINIC | Facility: CLINIC | Age: 68
End: 2023-11-27
Payer: MEDICARE

## 2023-11-27 NOTE — PROGRESS NOTES
"Chief Complaint  Med Refill    Neymar Hagen presents to [facility]  Heartburn  This is a chronic problem. The current episode started more than 1 year ago. The problem has been waxing and waning. She has tried a PPI for the symptoms. The treatment provided significant relief.   Neck Pain   This is a recurrent problem. The current episode started more than 1 month ago. The problem has been unchanged. The pain is associated with lifting a heavy object. The pain is present in the midline. The quality of the pain is described as aching. The pain is moderate. Associated symptoms include headaches. She has tried NSAIDs for the symptoms. The treatment provided moderate relief.   Back Pain  This is a chronic problem. The current episode started more than 1 year ago. The pain is present in the lumbar spine. The quality of the pain is described as aching. The pain is moderate. Associated symptoms include headaches. She has tried NSAIDs for the symptoms. The treatment provided moderate relief.       Review of Systems   Musculoskeletal:  Positive for back pain and neck pain.   Neurological:  Positive for headaches.         Objective   Vital Signs:   /82 (BP Location: Right arm, Patient Position: Sitting, Cuff Size: Adult)   Pulse 69   Temp 97.8 °F (36.6 °C) (Temporal)   Ht 165.1 cm (65\")   Wt 66.6 kg (146 lb 12.8 oz)   SpO2 95%   BMI 24.43 kg/m²     Physical Exam  Constitutional:       General: She is not in acute distress.     Appearance: Normal appearance. She is well-developed and well-groomed. She is not ill-appearing, toxic-appearing or diaphoretic.   HENT:      Head: Normocephalic.      Nose: Nose normal. No congestion or rhinorrhea.      Mouth/Throat:      Mouth: Mucous membranes are moist.      Pharynx: Oropharynx is clear. No oropharyngeal exudate or posterior oropharyngeal erythema.   Eyes:      General: Lids are normal.         Right eye: No " discharge.         Left eye: No discharge.      Extraocular Movements: Extraocular movements intact.      Pupils: Pupils are equal, round, and reactive to light.   Neck:      Vascular: No carotid bruit.   Cardiovascular:      Rate and Rhythm: Normal rate and regular rhythm.      Pulses: Normal pulses.      Heart sounds: Normal heart sounds. No murmur heard.     No friction rub. No gallop.   Pulmonary:      Effort: Pulmonary effort is normal. No respiratory distress.      Breath sounds: Normal breath sounds. No stridor. No wheezing, rhonchi or rales.   Chest:      Chest wall: No tenderness.   Abdominal:      General: Bowel sounds are normal. There is no distension.      Palpations: Abdomen is soft. There is no mass.      Tenderness: There is no abdominal tenderness. There is no right CVA tenderness, left CVA tenderness, guarding or rebound.      Hernia: No hernia is present.   Musculoskeletal:         General: No swelling or tenderness.      Cervical back: Normal range of motion and neck supple. No rigidity or tenderness.      Lumbar back: Decreased range of motion.      Right lower leg: No edema.      Left lower leg: No edema.   Lymphadenopathy:      Cervical: No cervical adenopathy.   Skin:     General: Skin is warm.      Capillary Refill: Capillary refill takes less than 2 seconds.      Coloration: Skin is not jaundiced.      Findings: No bruising, erythema or rash.   Neurological:      General: No focal deficit present.      Mental Status: She is alert and oriented to person, place, and time.      Motor: Motor function is intact. No weakness.      Coordination: Coordination is intact.      Gait: Gait is intact. Gait normal.   Psychiatric:         Attention and Perception: Attention normal.         Mood and Affect: Mood normal.         Speech: Speech normal.         Behavior: Behavior normal.         Cognition and Memory: Cognition normal.         Judgment: Judgment normal.        Result Review :                  Assessment and Plan    Diagnoses and all orders for this visit:    1. Neck pain (Primary)  -     XR Spine Cervical Complete 4 or 5 View (In Office)    2. Gastroesophageal reflux disease with esophagitis without hemorrhage  -     pantoprazole (PROTONIX) 20 MG EC tablet; Take 1 tablet by mouth 2 (Two) Times a Day. No further refills until seen in office.  Dispense: 180 tablet; Refill: 0    3. Chronic bilateral low back pain without sciatica  -     XR Spine Lumbar 2 or 3 View (In Office)      Patient's Body mass index is 24.43 kg/m². indicating that she is within normal range (BMI 18.5-24.9). No BMI management plan needed..    Follow Up   Return if symptoms worsen or fail to improve.  Patient was given instructions and counseling regarding her condition or for health maintenance advice. Please see specific information pulled into the AVS if appropriate.     This document has been electronically signed by DEWAYNE Dunbar  November 27, 2023 12:57 EST

## 2023-11-27 NOTE — TELEPHONE ENCOUNTER
----- Message from DEWAYNE Dunbar sent at 11/27/2023  1:17 PM EST -----  Please let patient know results are normal in the cervical spine, the lumbar spine did show some disc narrowing. Would she like to try pt? Thank you.

## 2023-11-27 NOTE — TELEPHONE ENCOUNTER
----- Message from DEWAYNE Dunbar sent at 11/27/2023  1:17 PM EST -----  Please let patient know results are normal in the cervical spine, the lumbar spine did show some disc narrowing. Would she like to try pt? Thank you.       Spoke with patient & she verbalized understanding,declines PT stated she was going to go see a Chiropractor.

## 2024-02-21 DIAGNOSIS — K21.00 GASTROESOPHAGEAL REFLUX DISEASE WITH ESOPHAGITIS WITHOUT HEMORRHAGE: ICD-10-CM

## 2024-02-21 RX ORDER — PANTOPRAZOLE SODIUM 20 MG/1
20 TABLET, DELAYED RELEASE ORAL 2 TIMES DAILY
Qty: 180 TABLET | Refills: 0 | Status: SHIPPED | OUTPATIENT
Start: 2024-02-21

## 2024-03-20 ENCOUNTER — OFFICE VISIT (OUTPATIENT)
Dept: PULMONOLOGY | Facility: CLINIC | Age: 69
End: 2024-03-20
Payer: MEDICARE

## 2024-03-20 ENCOUNTER — OFFICE VISIT (OUTPATIENT)
Dept: CARDIOLOGY | Facility: CLINIC | Age: 69
End: 2024-03-20
Payer: MEDICARE

## 2024-03-20 VITALS
DIASTOLIC BLOOD PRESSURE: 58 MMHG | SYSTOLIC BLOOD PRESSURE: 118 MMHG | BODY MASS INDEX: 24.16 KG/M2 | OXYGEN SATURATION: 97 % | HEART RATE: 66 BPM | TEMPERATURE: 96.4 F | HEIGHT: 65 IN | WEIGHT: 145 LBS

## 2024-03-20 VITALS
SYSTOLIC BLOOD PRESSURE: 124 MMHG | BODY MASS INDEX: 24.19 KG/M2 | OXYGEN SATURATION: 95 % | HEART RATE: 73 BPM | DIASTOLIC BLOOD PRESSURE: 61 MMHG | WEIGHT: 145.2 LBS | HEIGHT: 65 IN

## 2024-03-20 DIAGNOSIS — K21.00 GASTROESOPHAGEAL REFLUX DISEASE WITH ESOPHAGITIS WITHOUT HEMORRHAGE: ICD-10-CM

## 2024-03-20 DIAGNOSIS — R73.9 HYPERGLYCEMIA: ICD-10-CM

## 2024-03-20 DIAGNOSIS — I10 ESSENTIAL HYPERTENSION: Primary | ICD-10-CM

## 2024-03-20 DIAGNOSIS — J44.9 CHRONIC OBSTRUCTIVE PULMONARY DISEASE, UNSPECIFIED COPD TYPE: Primary | ICD-10-CM

## 2024-03-20 DIAGNOSIS — Z12.2 ENCOUNTER FOR SCREENING FOR LUNG CANCER: ICD-10-CM

## 2024-03-20 DIAGNOSIS — F17.210 CIGARETTE NICOTINE DEPENDENCE WITHOUT COMPLICATION: ICD-10-CM

## 2024-03-20 PROCEDURE — 3074F SYST BP LT 130 MM HG: CPT | Performed by: PHYSICIAN ASSISTANT

## 2024-03-20 PROCEDURE — 1160F RVW MEDS BY RX/DR IN RCRD: CPT | Performed by: NURSE PRACTITIONER

## 2024-03-20 PROCEDURE — 99214 OFFICE O/P EST MOD 30 MIN: CPT | Performed by: NURSE PRACTITIONER

## 2024-03-20 PROCEDURE — 1159F MED LIST DOCD IN RCRD: CPT | Performed by: NURSE PRACTITIONER

## 2024-03-20 PROCEDURE — 3078F DIAST BP <80 MM HG: CPT | Performed by: PHYSICIAN ASSISTANT

## 2024-03-20 PROCEDURE — 3074F SYST BP LT 130 MM HG: CPT | Performed by: NURSE PRACTITIONER

## 2024-03-20 PROCEDURE — 3078F DIAST BP <80 MM HG: CPT | Performed by: NURSE PRACTITIONER

## 2024-03-20 PROCEDURE — 99213 OFFICE O/P EST LOW 20 MIN: CPT | Performed by: PHYSICIAN ASSISTANT

## 2024-03-20 RX ORDER — METOPROLOL SUCCINATE 25 MG/1
25 TABLET, EXTENDED RELEASE ORAL DAILY
Qty: 90 TABLET | Refills: 3 | Status: SHIPPED | OUTPATIENT
Start: 2024-03-20

## 2024-03-20 RX ORDER — PANTOPRAZOLE SODIUM 20 MG/1
20 TABLET, DELAYED RELEASE ORAL 2 TIMES DAILY
Qty: 180 TABLET | Refills: 0 | Status: SHIPPED | OUTPATIENT
Start: 2024-03-20

## 2024-03-20 RX ORDER — UMECLIDINIUM BROMIDE AND VILANTEROL TRIFENATATE 62.5; 25 UG/1; UG/1
1 POWDER RESPIRATORY (INHALATION)
Qty: 60 EACH | Refills: 5 | OUTPATIENT
Start: 2024-03-20

## 2024-03-20 RX ORDER — ATORVASTATIN CALCIUM 80 MG/1
80 TABLET, FILM COATED ORAL DAILY
Qty: 90 TABLET | Refills: 3 | Status: SHIPPED | OUTPATIENT
Start: 2024-03-20

## 2024-03-20 RX ORDER — DOXYCYCLINE HYCLATE 100 MG/1
100 CAPSULE ORAL 2 TIMES DAILY
Qty: 20 CAPSULE | Refills: 0 | Status: SHIPPED | OUTPATIENT
Start: 2024-03-20

## 2024-03-20 RX ORDER — ISOSORBIDE MONONITRATE 120 MG/1
120 TABLET, EXTENDED RELEASE ORAL EVERY MORNING
Qty: 90 TABLET | Refills: 3 | Status: SHIPPED | OUTPATIENT
Start: 2024-03-20

## 2024-03-20 RX ORDER — PREDNISONE 20 MG/1
40 TABLET ORAL DAILY
Qty: 10 TABLET | Refills: 0 | Status: SHIPPED | OUTPATIENT
Start: 2024-03-20

## 2024-03-20 RX ORDER — UMECLIDINIUM BROMIDE AND VILANTEROL TRIFENATATE 62.5; 25 UG/1; UG/1
1 POWDER RESPIRATORY (INHALATION)
Qty: 60 EACH | Refills: 5 | Status: SHIPPED | OUTPATIENT
Start: 2024-03-20

## 2024-03-20 RX ORDER — NITROGLYCERIN 0.4 MG/1
0.4 TABLET SUBLINGUAL
Qty: 25 TABLET | Refills: 0 | Status: SHIPPED | OUTPATIENT
Start: 2024-03-20

## 2024-03-20 NOTE — PROGRESS NOTES
"Chief Complaint  Chronic obstructive pulmonary disease, unspecified COPD type    Subjective        Itezl Hagen presents to [facility]  History of Present Illness    Ms. Hagen is a 68 year old female with a medical history significant for hypertension, COPD, GERD, and hyperlipidemia.    She presents today for follow up on COPD.  She states that overall she has been doing well since her last visit.  She states that her breathing is at baseline.  She is currently taking anoro once daily and albuterol as needed.  She is a current smoker.    Objective   Vital Signs:  /58   Pulse 66   Temp 96.4 °F (35.8 °C)   Ht 165.1 cm (65\")   Wt 65.8 kg (145 lb)   SpO2 97%   BMI 24.13 kg/m²   Estimated body mass index is 24.13 kg/m² as calculated from the following:    Height as of this encounter: 165.1 cm (65\").    Weight as of this encounter: 65.8 kg (145 lb).       BMI is within normal parameters. No other follow-up for BMI required.      Physical Exam     GENERAL APPEARANCE: Well developed, well nourished, alert and cooperative, and appears to be in no acute distress.    HEAD: normocephalic. Atraumatic.    EYES: PERRL, EOMI. Vision is grossly intact.    THROAT: Oral cavity and pharynx normal. No inflammation, swelling, exudate, or lesions.     NECK: Neck supple.  No thyromegaly.    CARDIAC: Normal S1 and S2. No S3, S4 or murmurs. Rhythm is regular.     RESPIRATORY:Bilateral air entry positive. Bilateral diminished breath sounds. No wheezing, crackles or rhonchi noted.    GI: Positive bowel sounds. Soft, nondistended, nontender.     MUSCULOSKELETAL: No significant deformity or joint abnormality. No edema. Peripheral pulses intact. No varicosities.    NEUROLOGICAL: Strength and sensation symmetric and intact throughout.     PSYCHIATRIC: The mental examination revealed the patient was oriented to person, place, and time.     Result Review :    The following " data was reviewed by: DEWAYNE Trevino on 03/20/2024:                 Assessment and Plan     Diagnoses and all orders for this visit:    1. Chronic obstructive pulmonary disease, unspecified COPD type (Primary)    2. Gastroesophageal reflux disease with esophagitis without hemorrhage  -     pantoprazole (PROTONIX) 20 MG EC tablet; Take 1 tablet by mouth 2 (Two) Times a Day. No further refills until seen in office.  Dispense: 180 tablet; Refill: 0    3. Cigarette nicotine dependence without complication  -     CT chest low dose wo; Future    4. Encounter for screening for lung cancer  -     CT chest low dose wo; Future    Other orders  -     Umeclidinium-Vilanterol (Anoro Ellipta) 62.5-25 MCG/ACT aerosol powder  inhaler; Inhale 1 puff Daily.  Dispense: 60 each; Refill: 5  -     albuterol (PROAIR RESPICLICK) 108 (90 Base) MCG/ACT inhaler; Inhale 2 puffs Every 4 (Four) Hours As Needed for Wheezing or Shortness of Air. As needed every 4-6 hours  Dispense: 1 each; Refill: 5  -     doxycycline (VIBRAMYCIN) 100 MG capsule; Take 1 capsule by mouth 2 (Two) Times a Day.  Dispense: 20 capsule; Refill: 0  -     predniSONE (DELTASONE) 20 MG tablet; Take 2 tablets by mouth Daily.  Dispense: 10 tablet; Refill: 0        Continue Anoro once daily.  Continue albuterol as needed.  Continue duonebs as needed.    Sent in refills.  Sent in refills for protonix.    LDCT ordered to be done in September 2024 for lung cancer screening.    Smoking about  1 1/2 ppd.    [facility]  reports that she has been smoking cigarettes. She has been exposed to tobacco smoke. She has never used smokeless tobacco. I have educated her on the risk of diseases from using tobacco products such as cancer, COPD, and heart disease.     I advised her to quit and she is trying to quit but has not yet set a quit date.            Follow Up     Return in about 6 months (around 9/20/2024).  Patient was given instructions and counseling regarding her  condition or for health maintenance advice. Please see specific information pulled into the AVS if appropriate.

## 2024-03-20 NOTE — PROGRESS NOTES
Leonie Hdz APRN  Itzel Hagen  1955 03/20/2024    Patient Active Problem List   Diagnosis    Precordial pain    Angina, class III    Cigarette nicotine dependence without complication    Family history of premature coronary artery disease    Heartburn    Acute pancreatitis without infection or necrosis    Abnormal CT of the abdomen    Chest pain    Chronic fatigue syndrome    Gastro-esophageal reflux disease without esophagitis    Chronic pancreatitis    Esophagitis, erosive    Gastritis, erosive    Essential hypertension    Gastroesophageal reflux disease with esophagitis without hemorrhage    Encounter for screening for malignant neoplasm of colon       Dear Leonie Hdz APRN:    Subjective     History of Present Illness:    Chief Complaint   Patient presents with    Follow-up     ROUTINE       Itzel Hagen is a pleasant 68 y.o. female with a past medical history significant for nonobstructive coronary artery disease with 20% stenosis in the RCA, tobacco abuse, family history of coronary artery disease with mother having CABG in a brother received stents in their 40s.  She does not have history of diabetes mellitus, however, does smoke tobacco. She comes in today for cardiology follow-up.       Itzel still reports having some left-sided neck pain that she describes as sharp.  She denies any particular exacerbating factors however when the pain is present it is more painful to move her neck left and right.  She does try to walk regularly and reports that this does not reproduce the pain.  She denies any worsening shortness of breath from baseline, syncope, or near syncope.  Blood pressure has been well-controlled.  She does deny any overt chest pains    Allergies   Allergen Reactions    Penicillins Anaphylaxis    Coconut Swelling and Rash     Tongue swells    Codeine Rash    Morphine Swelling   :      Current Outpatient Medications:     albuterol (PROAIR RESPICLICK) 108 (90 Base)  MCG/ACT inhaler, Inhale 2 puffs Every 4 (Four) Hours As Needed for Wheezing or Shortness of Air. As needed every 4-6 hours, Disp: 1 each, Rfl: 5    ascorbic acid (VITAMIN C) 1000 MG tablet, Take 1 tablet by mouth Daily., Disp: , Rfl:     aspirin 81 MG EC tablet, Take 1 tablet by mouth Daily., Disp: 90 tablet, Rfl: 2    atorvastatin (LIPITOR) 80 MG tablet, Take 1 tablet by mouth Daily., Disp: 90 tablet, Rfl: 3    B Complex Vitamins (VITAMIN B COMPLEX PO), Take 2 capsules by mouth Daily., Disp: , Rfl:     buPROPion (Zyban) 150 MG 12 hr tablet, Take 150 mg by mouth 2 (Two) Times a Day., Disp: 60 tablet, Rfl: 5    cholecalciferol (VITAMIN D3) 25 MCG (1000 UT) tablet, Take 5 tablets by mouth Daily., Disp: , Rfl:     ipratropium-albuterol (DUO-NEB) 0.5-2.5 mg/3 ml nebulizer, Take 3 mL by nebulization 4 (Four) Times a Day As Needed for Wheezing., Disp: 360 mL, Rfl: 5    isosorbide mononitrate (IMDUR) 120 MG 24 hr tablet, Take 1 tablet by mouth Every Morning., Disp: 90 tablet, Rfl: 3    metoprolol succinate XL (TOPROL-XL) 25 MG 24 hr tablet, Take 1 tablet by mouth Daily., Disp: 90 tablet, Rfl: 3    nitroglycerin (NITROSTAT) 0.4 MG SL tablet, Place 1 tablet under the tongue Every 5 (Five) Minutes As Needed for Chest Pain. Take no more than 3 doses in 15 minutes., Disp: 25 tablet, Rfl: 0    pantoprazole (PROTONIX) 20 MG EC tablet, TAKE 1 TABLET BY MOUTH 2 (TWO) TIMES A DAY. NO FURTHER REFILLS UNTIL SEEN IN OFFICE., Disp: 180 tablet, Rfl: 0    Umeclidinium-Vilanterol (Anoro Ellipta) 62.5-25 MCG/ACT aerosol powder  inhaler, Inhale 1 puff Daily., Disp: 60 each, Rfl: 5    doxycycline (VIBRAMYCIN) 100 MG capsule, Take 1 capsule by mouth 2 (Two) Times a Day. (Patient not taking: Reported on 3/20/2024), Disp: 20 capsule, Rfl: 0    nicotine (NICODERM CQ) 21 MG/24HR patch, Place 1 patch on the skin as directed by provider Daily. (Patient not taking: Reported on 3/20/2024), Disp: 28 each, Rfl: 1    predniSONE (DELTASONE) 20 MG tablet,  "Take 2 tablets by mouth Daily. (Patient not taking: Reported on 3/20/2024), Disp: 10 tablet, Rfl: 0    traZODone (DESYREL) 50 MG tablet, Take 1 tablet by mouth Every Night. (Patient not taking: Reported on 3/20/2024), Disp: 30 tablet, Rfl: 2    The following portions of the patient's history were reviewed and updated as appropriate: allergies, current medications, past family history, past medical history, past social history, past surgical history and problem list.    Social History     Tobacco Use    Smoking status: Every Day     Current packs/day: 0.50     Average packs/day: 0.5 packs/day for 53.2 years (26.6 ttl pk-yrs)     Types: Cigarettes     Start date: 1971     Passive exposure: Current    Smokeless tobacco: Never   Vaping Use    Vaping status: Never Used   Substance Use Topics    Alcohol use: No     Comment: \"not in 30 years\"    Drug use: No         Objective   Vitals:    03/20/24 1429   BP: 124/61   Pulse: 73   SpO2: 95%   Weight: 65.9 kg (145 lb 3.2 oz)   Height: 165.1 cm (65\")     Body mass index is 24.16 kg/m².    ROS    Constitutional:       General: Not in acute distress.     Appearance: Healthy appearance. Well-developed and not in distress. Not diaphoretic.   Eyes:      Conjunctiva/sclera: Conjunctivae normal.      Pupils: Pupils are equal, round, and reactive to light.   HENT:      Head: Normocephalic and atraumatic.   Neck:      Vascular: No carotid bruit or JVD.   Pulmonary:      Effort: Pulmonary effort is normal. No respiratory distress.      Breath sounds: Normal breath sounds.   Cardiovascular:      Normal rate. Regular rhythm.   Edema:     Peripheral edema absent.   Skin:     General: Skin is cool.   Neurological:      Mental Status: Alert, oriented to person, place, and time and oriented to person, place and time.         Lab Results   Component Value Date     03/24/2022    K 4.0 03/24/2022     03/24/2022    CO2 28.0 03/24/2022    BUN 11 03/24/2022    CREATININE 0.80 03/24/2022 " "   GLUCOSE 108 (H) 03/24/2022    CALCIUM 9.7 03/24/2022    AST 24 03/24/2022    ALT 18 03/24/2022    ALKPHOS 180 (H) 03/24/2022     No results found for: \"CKTOTAL\"  Lab Results   Component Value Date    WBC 14.69 (H) 11/13/2021    HGB 14.0 11/13/2021    HCT 41.6 11/13/2021     11/13/2021     Lab Results   Component Value Date    INR 0.99 11/13/2021     Lab Results   Component Value Date    MG 1.5 (L) 11/13/2021     Lab Results   Component Value Date    TRIG 124 03/24/2022    HDL 45 03/24/2022    LDL 48 03/24/2022      No results found for: \"BNP\"    During this visit the following were done:  Labs Reviewed []    Labs Ordered []    Radiology Reports Reviewed []    Radiology Ordered []    PCP Records Reviewed []    Referring Provider Records Reviewed []    ER Records Reviewed []    Hospital Records Reviewed []    History Obtained From Family []    Radiology Images Reviewed []    Other Reviewed []    Records Requested []       Procedures    Assessment & Plan    Diagnosis Plan   1. Essential hypertension  CBC (No Diff)    Comprehensive Metabolic Panel    TSH    Hemoglobin A1c    Lipid Panel      2. Hyperglycemia  Hemoglobin A1c               Recommendations:  Neck pain  Potential anginal equivalent.  I did discuss possibly evaluating further with stress test with imaging.  She is reluctant and declined for now.  I do think this is reasonable overall my suspicion for this being anginal, is low as the pain is made worse with neck movements.  Essential hypertension  Well-controlled  I will order routine labs with CBC, CMP, TSH, hemoglobin A1c, and lipid panel.    No follow-ups on file.    As always, I appreciate very much the opportunity to participate in the cardiovascular care of your patients.      With Best Regards,    Sebastian Fuchs PA-C          "

## 2024-05-06 ENCOUNTER — TELEPHONE (OUTPATIENT)
Dept: PULMONOLOGY | Facility: CLINIC | Age: 69
End: 2024-05-06
Payer: MEDICARE

## 2024-05-06 RX ORDER — ALBUTEROL SULFATE 90 UG/1
2 AEROSOL, METERED RESPIRATORY (INHALATION) EVERY 4 HOURS PRN
Qty: 6.7 G | Refills: 5 | Status: SHIPPED | OUTPATIENT
Start: 2024-05-06

## 2024-05-06 NOTE — TELEPHONE ENCOUNTER
Patients insurance will not cover the pro air. She just needs a script for regular albuterol sent to [facility]!  Thank you!

## 2024-05-21 RX ORDER — BUPROPION HYDROCHLORIDE 150 MG/1
150 TABLET, FILM COATED, EXTENDED RELEASE ORAL 2 TIMES DAILY
Qty: 60 TABLET | Refills: 5 | Status: SHIPPED | OUTPATIENT
Start: 2024-05-21

## 2024-08-06 DIAGNOSIS — K21.00 GASTROESOPHAGEAL REFLUX DISEASE WITH ESOPHAGITIS WITHOUT HEMORRHAGE: ICD-10-CM

## 2024-08-06 RX ORDER — PANTOPRAZOLE SODIUM 20 MG/1
20 TABLET, DELAYED RELEASE ORAL 2 TIMES DAILY
Qty: 180 TABLET | Refills: 0 | OUTPATIENT
Start: 2024-08-06

## 2024-08-06 NOTE — TELEPHONE ENCOUNTER
Caller: Itzel Hagen    Relationship: Self    Best call back number:  [phone]    Requested Prescriptions:   Requested Prescriptions     Pending Prescriptions Disp Refills    pantoprazole (PROTONIX) 20 MG EC tablet 180 tablet 0     Sig: Take 1 tablet by mouth 2 (Two) Times a Day. No further refills until seen in office.        Pharmacy where request should be sent:    [facility]/pharmacy #[id number] - [place], [place] - [address] [place] [address] [phone]  - [phone] [address] [address]   Phone: [phone] Fax: [phone]   Hours: Not open 24 hours       Last office visit with prescribing clinician: Visit date not found   Last telemedicine visit with prescribing clinician: Visit date not found   Next office visit with prescribing clinician: 10/23/2024     Additional details provided by patient: PT WAS ON HER WAY FOR HER APPT ON 08/06/2024 WHEN HAD A FLAT TIME, PT CALLED OFFICE BUT SINCE SHE WAS GOING TO BE MORE THAN 15 MINS LATE, PT HAD TO RESCHEDULE.     Does the patient have less than a 3 day supply:  [] Yes  [x] No    Would you like a call back once the refill request has been completed: [x] Yes [] No    If the office needs to give you a call back, can they leave a voicemail: [x] Yes [] No    Meg Gutierrez   08/06/24 13:40 EDT

## 2024-09-16 DIAGNOSIS — Z12.2 ENCOUNTER FOR SCREENING FOR LUNG CANCER: ICD-10-CM

## 2024-09-16 DIAGNOSIS — F17.210 CIGARETTE NICOTINE DEPENDENCE WITHOUT COMPLICATION: Primary | ICD-10-CM

## 2024-09-26 ENCOUNTER — HOSPITAL ENCOUNTER (OUTPATIENT)
Dept: CT IMAGING | Facility: HOSPITAL | Age: 69
Discharge: HOME | End: 2024-09-26
Admitting: NURSE PRACTITIONER
Payer: MEDICARE

## 2024-09-26 DIAGNOSIS — F17.210 CIGARETTE NICOTINE DEPENDENCE WITHOUT COMPLICATION: ICD-10-CM

## 2024-09-26 DIAGNOSIS — Z12.2 ENCOUNTER FOR SCREENING FOR LUNG CANCER: ICD-10-CM

## 2024-09-26 PROCEDURE — 71271 CT THORAX LUNG CANCER SCR C-: CPT

## 2024-10-03 ENCOUNTER — DOCUMENTATION (OUTPATIENT)
Dept: PULMONOLOGY | Facility: CLINIC | Age: 69
End: 2024-10-03
Payer: MEDICARE

## 2024-10-03 NOTE — PROGRESS NOTES
LDCT was reviewed.  Previously noted pulmonary nodules are stable.  No new nodules are seen. We will repeat CT Chest in one year.

## 2024-10-03 NOTE — LETTER
Itzel Hagen  [address] [address]    October 3, 2024     Dear Ms. Hagen:    Below are the results from your recent visit:    Low dose CT Chest was reviewed.  Previously noted pulmonary nodules are noted to be stable.  No new nodules are seen.  We will plan to repeat CT chest in one year per screening guidelines.    If you have any questions or concerns, please don't hesitate to call.         Sincerely,        Sharon Rhodes APRN

## 2024-10-07 ENCOUNTER — OFFICE VISIT (OUTPATIENT)
Dept: CARDIOLOGY | Facility: CLINIC | Age: 69
End: 2024-10-07
Payer: MEDICARE

## 2024-10-07 ENCOUNTER — OFFICE VISIT (OUTPATIENT)
Dept: FAMILY MEDICINE CLINIC | Facility: CLINIC | Age: 69
End: 2024-10-07
Payer: MEDICARE

## 2024-10-07 ENCOUNTER — OFFICE VISIT (OUTPATIENT)
Dept: PULMONOLOGY | Facility: CLINIC | Age: 69
End: 2024-10-07
Payer: MEDICARE

## 2024-10-07 VITALS
DIASTOLIC BLOOD PRESSURE: 65 MMHG | BODY MASS INDEX: 25.16 KG/M2 | SYSTOLIC BLOOD PRESSURE: 116 MMHG | WEIGHT: 151 LBS | HEART RATE: 60 BPM | OXYGEN SATURATION: 96 % | HEIGHT: 65 IN

## 2024-10-07 VITALS
DIASTOLIC BLOOD PRESSURE: 62 MMHG | HEIGHT: 65 IN | SYSTOLIC BLOOD PRESSURE: 118 MMHG | OXYGEN SATURATION: 97 % | TEMPERATURE: 97.7 F | BODY MASS INDEX: 25.06 KG/M2 | WEIGHT: 150.4 LBS | HEART RATE: 66 BPM

## 2024-10-07 VITALS
HEART RATE: 59 BPM | BODY MASS INDEX: 24.89 KG/M2 | HEIGHT: 65 IN | TEMPERATURE: 96.6 F | WEIGHT: 149.4 LBS | DIASTOLIC BLOOD PRESSURE: 84 MMHG | SYSTOLIC BLOOD PRESSURE: 138 MMHG | OXYGEN SATURATION: 94 %

## 2024-10-07 DIAGNOSIS — I25.83 CORONARY ARTERY DISEASE DUE TO LIPID RICH PLAQUE: ICD-10-CM

## 2024-10-07 DIAGNOSIS — K21.00 GASTROESOPHAGEAL REFLUX DISEASE WITH ESOPHAGITIS WITHOUT HEMORRHAGE: ICD-10-CM

## 2024-10-07 DIAGNOSIS — J44.9 CHRONIC OBSTRUCTIVE PULMONARY DISEASE, UNSPECIFIED COPD TYPE: Primary | ICD-10-CM

## 2024-10-07 DIAGNOSIS — I10 ESSENTIAL HYPERTENSION: Primary | ICD-10-CM

## 2024-10-07 DIAGNOSIS — G47.00 INSOMNIA, UNSPECIFIED TYPE: ICD-10-CM

## 2024-10-07 DIAGNOSIS — F17.210 CIGARETTE NICOTINE DEPENDENCE WITHOUT COMPLICATION: ICD-10-CM

## 2024-10-07 DIAGNOSIS — I25.10 CORONARY ARTERY DISEASE DUE TO LIPID RICH PLAQUE: ICD-10-CM

## 2024-10-07 DIAGNOSIS — Z82.49 FAMILY HISTORY OF PREMATURE CORONARY ARTERY DISEASE: ICD-10-CM

## 2024-10-07 PROBLEM — R07.2 PRECORDIAL PAIN: Status: RESOLVED | Noted: 2019-09-04 | Resolved: 2024-10-07

## 2024-10-07 PROBLEM — R07.9 CHEST PAIN: Chronic | Status: RESOLVED | Noted: 2019-09-05 | Resolved: 2024-10-07

## 2024-10-07 PROBLEM — I20.9 ANGINA, CLASS III: Status: RESOLVED | Noted: 2019-09-04 | Resolved: 2024-10-07

## 2024-10-07 PROCEDURE — 1126F AMNT PAIN NOTED NONE PRSNT: CPT | Performed by: FAMILY MEDICINE

## 2024-10-07 PROCEDURE — 3078F DIAST BP <80 MM HG: CPT | Performed by: PHYSICIAN ASSISTANT

## 2024-10-07 PROCEDURE — 80053 COMPREHEN METABOLIC PANEL: CPT | Performed by: PHYSICIAN ASSISTANT

## 2024-10-07 PROCEDURE — 93000 ELECTROCARDIOGRAM COMPLETE: CPT | Performed by: PHYSICIAN ASSISTANT

## 2024-10-07 PROCEDURE — 3079F DIAST BP 80-89 MM HG: CPT | Performed by: FAMILY MEDICINE

## 2024-10-07 PROCEDURE — 83036 HEMOGLOBIN GLYCOSYLATED A1C: CPT | Performed by: PHYSICIAN ASSISTANT

## 2024-10-07 PROCEDURE — 1159F MED LIST DOCD IN RCRD: CPT | Performed by: PHYSICIAN ASSISTANT

## 2024-10-07 PROCEDURE — 1170F FXNL STATUS ASSESSED: CPT | Performed by: FAMILY MEDICINE

## 2024-10-07 PROCEDURE — 99214 OFFICE O/P EST MOD 30 MIN: CPT | Performed by: PHYSICIAN ASSISTANT

## 2024-10-07 PROCEDURE — 3075F SYST BP GE 130 - 139MM HG: CPT | Performed by: FAMILY MEDICINE

## 2024-10-07 PROCEDURE — 80061 LIPID PANEL: CPT | Performed by: PHYSICIAN ASSISTANT

## 2024-10-07 PROCEDURE — 3074F SYST BP LT 130 MM HG: CPT | Performed by: PHYSICIAN ASSISTANT

## 2024-10-07 PROCEDURE — 1160F RVW MEDS BY RX/DR IN RCRD: CPT | Performed by: PHYSICIAN ASSISTANT

## 2024-10-07 PROCEDURE — 84443 ASSAY THYROID STIM HORMONE: CPT | Performed by: PHYSICIAN ASSISTANT

## 2024-10-07 PROCEDURE — 85027 COMPLETE CBC AUTOMATED: CPT | Performed by: PHYSICIAN ASSISTANT

## 2024-10-07 PROCEDURE — G0439 PPPS, SUBSEQ VISIT: HCPCS | Performed by: FAMILY MEDICINE

## 2024-10-07 RX ORDER — PREDNISONE 10 MG/1
TABLET ORAL
Qty: 42 TABLET | Refills: 0 | Status: SHIPPED | OUTPATIENT
Start: 2024-10-07

## 2024-10-07 RX ORDER — PANTOPRAZOLE SODIUM 20 MG/1
20 TABLET, DELAYED RELEASE ORAL 2 TIMES DAILY
Qty: 180 TABLET | Refills: 0 | Status: SHIPPED | OUTPATIENT
Start: 2024-10-07

## 2024-10-07 RX ORDER — TRAZODONE HYDROCHLORIDE 50 MG/1
50 TABLET, FILM COATED ORAL NIGHTLY
Qty: 30 TABLET | Refills: 2 | Status: SHIPPED | OUTPATIENT
Start: 2024-10-07

## 2024-10-07 RX ORDER — DOXYCYCLINE 100 MG/1
100 CAPSULE ORAL 2 TIMES DAILY
Qty: 20 CAPSULE | Refills: 0 | Status: SHIPPED | OUTPATIENT
Start: 2024-10-07

## 2024-10-07 NOTE — PROGRESS NOTES
"Chief Complaint  COPD and Cough    Subjective          Itzel Hagen presents to [facility] for   History of Present Illness      Ms. Hagen is a 68 year old female with a medical history significant for hypertension, COPD, GERD, and hyperlipidemia.    She presents today for follow up on COPD. She states that she has been doing well until a few weeks ago.  She states that a few weeks ago she started to have a worsening cough.  She has also had some wheezing.  She is currently taking Anoro once daily, albuterol as needed.  She is using neb treatments.      Objective   Vital Signs:   /62   Pulse 66   Temp 97.7 °F (36.5 °C)   Ht 165.1 cm (65\")   Wt 68.2 kg (150 lb 6.4 oz)   SpO2 97%   BMI 25.03 kg/m²         Physical Exam    GENERAL APPEARANCE: Well developed, well nourished, alert and cooperative, and appears to be in no acute distress.    HEAD: normocephalic. Atraumatic.    EYES: PERRL, EOMI. Vision is grossly intact.    THROAT: Oral cavity and pharynx normal. No inflammation, swelling, exudate, or lesions.     NECK: Neck supple.  No thyromegaly.    CARDIAC: Normal S1 and S2. No S3, S4 or murmurs. Rhythm is regular.     RESPIRATORY:Bilateral air entry positive. Bilateral wheezing.    GI: Positive bowel sounds. Soft, nondistended, nontender.     MUSCULOSKELETAL: No significant deformity or joint abnormality. No edema. Peripheral pulses intact. No varicosities.    NEUROLOGICAL: Strength and sensation symmetric and intact throughout.     PSYCHIATRIC: The mental examination revealed the patient was oriented to person, place, and time.       Estimated body mass index is 25.03 kg/m² as calculated from the following:    Height as of this encounter: 165.1 cm (65\").    Weight as of this encounter: 68.2 kg (150 lb 6.4 oz).        Result Review :   The following data was reviewed by: DEWAYNE Trevino on 10/07/2024:  Common labs          " "10/7/2024    11:54   Common Labs   Glucose 76    BUN 8    Creatinine 0.90    Sodium 138    Potassium 4.4    Chloride 101    Calcium 10.0    Albumin 4.0    Total Bilirubin 0.3    Alkaline Phosphatase 94    AST (SGOT) 21    ALT (SGPT) 13    WBC 7.26    Hemoglobin 15.7    Hematocrit 45.8    Platelets 260    Total Cholesterol 149    Triglycerides 93    HDL Cholesterol 55    LDL Cholesterol  77    Hemoglobin A1C 5.40           PFT:22  Severe obstructive lung disease with significant bronchodilator effect seen on this occasion.  Diffusion capacity is moderately reduced.  Lung volumes show mild restrictive lung disease.  Flow volume loop is obstructed and restricted.            Low dose lung cancer screenin24  FINDINGS: There is centrilobular emphysema. There are calcified  granulomas in the left lower lobe. There are stable subcentimeter  nodules in the right middle and lower lobes. There is no airspace  disease. The heart is normal in size. The aorta is normal in caliber.  There is no adenopathy within the chest. There are no pleural or  pericardial effusions. The visualized upper abdomen is unremarkable.  Bone windows reveal no acute osseous abnormalities.     IMPRESSION:  Lung RADS 2 benign findings     RECOMMENDATIONS:Annual low-dose chest CT.        This report was finalized on 2024 4:06 PM by Kandi Leiva M.D..      Previous chest imaging:NA    Alpha-1 antitrypsin screening:NA    STOP-Bang Score:   NA  [place] Sleepiness Scale:   NA      ABG:    pH No results found for: \"PHART\"   pO2 No results found for: \"PO2ART\"   pCO2 No results found for: \"FNJ7KAM\"   HCO3 No results found for: \"XRZ2URN\"                      Assessment and Plan    Problem List Items Addressed This Visit          Tobacco    Cigarette nicotine dependence without complication       Other    Gastroesophageal reflux disease with esophagitis without hemorrhage    Overview     Added automatically from request for surgery " [id number]          Other Visit Diagnoses       Chronic obstructive pulmonary disease, unspecified COPD type    -  Primary    Relevant Medications    predniSONE (DELTASONE) 10 MG tablet            Itzel Hagen  reports that she has been smoking cigarettes. She started smoking about 55 years ago. She has a 55.8 pack-year smoking history. She has been exposed to tobacco smoke. She has never used smokeless tobacco. I have educated her on the risk of diseases from using tobacco products such as cancer, COPD, and heart disease.     I advised her to quit and she is not willing to quit.      Continue Anoro once daily.  Continue albuterol as needed.  Continue duonebs as needed.      Continue Protonix.    LDCT was reviewed. Stable nodules were noted in the right middle and lower lobes. Lung Rads Category 2.  We will plan to repeat CT Chest in one year per the screening protocol.    Sent in prednisone for wheezing.      Follow Up   Return in about 6 months (around 4/7/2025).  Patient was given instructions and counseling regarding her condition or for health maintenance advice. Please see specific information pulled into the AVS if appropriate.

## 2024-10-07 NOTE — PROGRESS NOTES
Leonie Hdz APRN  Itzel Haegn  1955  10/07/2024    Patient Active Problem List   Diagnosis    Cigarette nicotine dependence without complication    Family history of premature coronary artery disease    Heartburn    Acute pancreatitis without infection or necrosis    Abnormal CT of the abdomen    Chronic fatigue syndrome    Gastro-esophageal reflux disease without esophagitis    Chronic pancreatitis    Esophagitis, erosive    Gastritis, erosive    Essential hypertension    Gastroesophageal reflux disease with esophagitis without hemorrhage    Encounter for screening for malignant neoplasm of colon    Coronary artery disease due to lipid rich plaque       Dear Leonie Hdz APRN:    Subjective     History of Present Illness:    Chief Complaint   Patient presents with    Follow-up       Itzel Hagen is a pleasant 68 y.o. female with a past medical history significant for nonobstructive coronary artery disease with 20% stenosis in the RCA, tobacco abuse, family history of coronary artery disease with mother having CABG in a brother received stents in their 40s.  She does not have history of diabetes mellitus, however, does smoke tobacco. She comes in today for cardiology follow-up.      Patient denies any chest pain, shortness of breath, palpitations, dizziness, syncope or near syncope. Blood pressure has been well controlled.     Allergies   Allergen Reactions    Penicillins Anaphylaxis    Coconut Swelling and Rash     Tongue swells    Codeine Rash    Morphine Swelling   :      Current Outpatient Medications:     albuterol sulfate  (90 Base) MCG/ACT inhaler, Inhale 2 puffs Every 4 (Four) Hours As Needed for Wheezing., Disp: 6.7 g, Rfl: 5    ascorbic acid (VITAMIN C) 1000 MG tablet, Take 1 tablet by mouth Daily., Disp: , Rfl:     aspirin 81 MG EC tablet, Take 1 tablet by mouth Daily., Disp: 90 tablet, Rfl: 2    atorvastatin (LIPITOR) 80 MG tablet, Take 1 tablet by mouth Daily., Disp:  90 tablet, Rfl: 3    B Complex Vitamins (VITAMIN B COMPLEX PO), Take 2 capsules by mouth Daily., Disp: , Rfl:     buPROPion (ZYBAN) 150 MG 12 hr tablet, TAKE 1 TABLET BY MOUTH TWICE A DAY, Disp: 60 tablet, Rfl: 5    cholecalciferol (VITAMIN D3) 25 MCG (1000 UT) tablet, Take 5 tablets by mouth Daily., Disp: , Rfl:     ipratropium-albuterol (DUO-NEB) 0.5-2.5 mg/3 ml nebulizer, Take 3 mL by nebulization 4 (Four) Times a Day As Needed for Wheezing., Disp: 360 mL, Rfl: 5    isosorbide mononitrate (IMDUR) 120 MG 24 hr tablet, Take 1 tablet by mouth Every Morning., Disp: 90 tablet, Rfl: 3    metoprolol succinate XL (TOPROL-XL) 25 MG 24 hr tablet, Take 1 tablet by mouth Daily., Disp: 90 tablet, Rfl: 3    nicotine (NICODERM CQ) 21 MG/24HR patch, Place 1 patch on the skin as directed by provider Daily., Disp: 28 each, Rfl: 1    nitroglycerin (NITROSTAT) 0.4 MG SL tablet, Place 1 tablet under the tongue Every 5 (Five) Minutes As Needed for Chest Pain. Take no more than 3 doses in 15 minutes., Disp: 25 tablet, Rfl: 0    pantoprazole (PROTONIX) 20 MG EC tablet, Take 1 tablet by mouth 2 (Two) Times a Day. No further refills until seen in office., Disp: 180 tablet, Rfl: 0    predniSONE (DELTASONE) 10 MG tablet, 6 daily x 2 days, 5 daily x 2 days, 4 daily x 2 days, 3 daily x 2 days, 2 daily x 2 days, 1 daily x 2 days, Disp: 42 tablet, Rfl: 0    traZODone (DESYREL) 50 MG tablet, Take 1 tablet by mouth Every Night., Disp: 30 tablet, Rfl: 2    Umeclidinium-Vilanterol (Anoro Ellipta) 62.5-25 MCG/ACT aerosol powder  inhaler, Inhale 1 puff Daily., Disp: 60 each, Rfl: 5    doxycycline (VIBRAMYCIN) 100 MG capsule, Take 1 capsule by mouth 2 (Two) Times a Day. (Patient not taking: Reported on 10/7/2024), Disp: 20 capsule, Rfl: 0    The following portions of the patient's history were reviewed and updated as appropriate: allergies, current medications, past family history, past medical history, past social history, past surgical history and  "problem list.    Social History     Tobacco Use    Smoking status: Every Day     Current packs/day: 1.00     Average packs/day: 1 pack/day for 55.8 years (55.8 ttl pk-yrs)     Types: Cigarettes     Start date: 1969     Passive exposure: Current    Smokeless tobacco: Never   Vaping Use    Vaping status: Never Used   Substance Use Topics    Alcohol use: No     Comment: \"not in 30 years\"    Drug use: No         Objective   Vitals:    10/07/24 1450   BP: 116/65   BP Location: Left arm   Patient Position: Sitting   Cuff Size: Adult   Pulse: 60   SpO2: 96%   Weight: 68.5 kg (151 lb)   Height: 165.1 cm (65\")     Body mass index is 25.13 kg/m².    ROS    Constitutional:       General: Not in acute distress.     Appearance: Healthy appearance. Well-developed and not in distress. Not diaphoretic.   Eyes:      Conjunctiva/sclera: Conjunctivae normal.      Pupils: Pupils are equal, round, and reactive to light.   HENT:      Head: Normocephalic and atraumatic.   Neck:      Vascular: No carotid bruit or JVD.   Pulmonary:      Effort: Pulmonary effort is normal. No respiratory distress.      Breath sounds: Normal breath sounds.   Cardiovascular:      Normal rate. Regular rhythm.   Edema:     Peripheral edema absent.   Skin:     General: Skin is cool.   Neurological:      Mental Status: Alert, oriented to person, place, and time and oriented to person, place and time.         Lab Results   Component Value Date     03/24/2022    K 4.0 03/24/2022     03/24/2022    CO2 28.0 03/24/2022    BUN 11 03/24/2022    CREATININE 0.80 03/24/2022    GLUCOSE 108 (H) 03/24/2022    CALCIUM 9.7 03/24/2022    AST 24 03/24/2022    ALT 18 03/24/2022    ALKPHOS 180 (H) 03/24/2022     No results found for: \"CKTOTAL\"  Lab Results   Component Value Date    WBC 14.69 (H) 11/13/2021    HGB 14.0 11/13/2021    HCT 41.6 11/13/2021     11/13/2021     Lab Results   Component Value Date    INR 0.99 11/13/2021     Lab Results   Component Value " "Date    MG 1.5 (L) 11/13/2021     Lab Results   Component Value Date    TRIG 124 03/24/2022    HDL 45 03/24/2022    LDL 48 03/24/2022      No results found for: \"BNP\"    During this visit the following were done:  Labs Reviewed []    Labs Ordered []    Radiology Reports Reviewed []    Radiology Ordered []    PCP Records Reviewed []    Referring Provider Records Reviewed []    ER Records Reviewed []    Hospital Records Reviewed []    History Obtained From Family []    Radiology Images Reviewed []    Other Reviewed []    Records Requested []         ECG 12 Lead    Date/Time: 10/7/2024 3:15 PM  Performed by: Sebastian Fuchs PA-C    Authorized by: Sebastian Fuchs PA-C  Comparison: compared with previous ECG   Similar to previous ECG  Rhythm: sinus rhythm  Conduction: conduction normal    Clinical impression: normal ECG          Assessment & Plan    Diagnosis Plan   1. Essential hypertension        2. Family history of premature coronary artery disease        3. Coronary artery disease due to lipid rich plaque                 Recommendations:  Nonobstructive CAD  Currently asymptomatic no recent anginal symptoms.  Continue aspirin, Lipitor, Imdur, metoprolol.  Essential hypertension  Excellently controlled  Dyslipidemia  Had labs drawn earlier this morning once resulted will tailor therapy more accordingly      No follow-ups on file.    As always, I appreciate very much the opportunity to participate in the cardiovascular care of your patients.      With Best Regards,    Sebastian Fuchs PA-C          "

## 2024-10-07 NOTE — PROGRESS NOTES
Subjective   The ABCs of the Annual Wellness Visit  Medicare Wellness Visit      Itzel Hagen is a 68 y.o. patient who presents for a Medicare Wellness Visit.    The following portions of the patient's history were reviewed and   updated as appropriate: allergies, current medications, past family history, past medical history, past social history, past surgical history, and problem list.    Compared to one year ago, the patient's physical   health is the same.  Compared to one year ago, the patient's mental   health is the same.    Recent Hospitalizations:  She was not admitted to the hospital during the last year.     Current Medical Providers:  Patient Care Team:  Leonie Hdz APRN as PCP - General (Nurse Practitioner)    Outpatient Medications Prior to Visit   Medication Sig Dispense Refill    albuterol sulfate  (90 Base) MCG/ACT inhaler Inhale 2 puffs Every 4 (Four) Hours As Needed for Wheezing. 6.7 g 5    ascorbic acid (VITAMIN C) 1000 MG tablet Take 1 tablet by mouth Daily.      aspirin 81 MG EC tablet Take 1 tablet by mouth Daily. 90 tablet 2    atorvastatin (LIPITOR) 80 MG tablet Take 1 tablet by mouth Daily. 90 tablet 3    B Complex Vitamins (VITAMIN B COMPLEX PO) Take 2 capsules by mouth Daily.      buPROPion (ZYBAN) 150 MG 12 hr tablet TAKE 1 TABLET BY MOUTH TWICE A DAY 60 tablet 5    cholecalciferol (VITAMIN D3) 25 MCG (1000 UT) tablet Take 5 tablets by mouth Daily.      ipratropium-albuterol (DUO-NEB) 0.5-2.5 mg/3 ml nebulizer Take 3 mL by nebulization 4 (Four) Times a Day As Needed for Wheezing. 360 mL 5    isosorbide mononitrate (IMDUR) 120 MG 24 hr tablet Take 1 tablet by mouth Every Morning. 90 tablet 3    metoprolol succinate XL (TOPROL-XL) 25 MG 24 hr tablet Take 1 tablet by mouth Daily. 90 tablet 3    nicotine (NICODERM CQ) 21 MG/24HR patch Place 1 patch on the skin as directed by provider Daily. 28 each 1    nitroglycerin (NITROSTAT) 0.4 MG SL tablet Place 1 tablet under the  tongue Every 5 (Five) Minutes As Needed for Chest Pain. Take no more than 3 doses in 15 minutes. 25 tablet 0    predniSONE (DELTASONE) 20 MG tablet Take 2 tablets by mouth Daily. 10 tablet 0    Umeclidinium-Vilanterol (Anoro Ellipta) 62.5-25 MCG/ACT aerosol powder  inhaler Inhale 1 puff Daily. 60 each 5    pantoprazole (PROTONIX) 20 MG EC tablet Take 1 tablet by mouth 2 (Two) Times a Day. No further refills until seen in office. 180 tablet 0    traZODone (DESYREL) 50 MG tablet Take 1 tablet by mouth Every Night. 30 tablet 2    doxycycline (VIBRAMYCIN) 100 MG capsule Take 1 capsule by mouth 2 (Two) Times a Day. (Patient not taking: Reported on 10/7/2024) 20 capsule 0     No facility-administered medications prior to visit.     No opioid medication identified on active medication list. I have reviewed chart for other potential  high risk medication/s and harmful drug interactions in the elderly.      Aspirin is on active medication list. Aspirin use is indicated based on review of current medical condition/s. Pros and cons of this therapy have been discussed today. Benefits of this medication outweigh potential harm.  Patient has been encouraged to continue taking this medication.  .      Patient Active Problem List   Diagnosis    Precordial pain    Angina, class III    Cigarette nicotine dependence without complication    Family history of premature coronary artery disease    Heartburn    Acute pancreatitis without infection or necrosis    Abnormal CT of the abdomen    Chest pain    Chronic fatigue syndrome    Gastro-esophageal reflux disease without esophagitis    Chronic pancreatitis    Esophagitis, erosive    Gastritis, erosive    Essential hypertension    Gastroesophageal reflux disease with esophagitis without hemorrhage    Encounter for screening for malignant neoplasm of colon     Advance Care Planning Advance Directive is not on file.  ACP discussion was declined by the patient. Patient does not have an  "advance directive, declines further assistance.            Objective   Vitals:    10/07/24 1114   BP: 138/84   BP Location: Right arm   Patient Position: Sitting   Cuff Size: Adult   Pulse: 59   Temp: 96.6 °F (35.9 °C)   TempSrc: Temporal   SpO2: 94%   Weight: 67.8 kg (149 lb 6.4 oz)   Height: 165.1 cm (65\")   PainSc: 0-No pain       Estimated body mass index is 24.86 kg/m² as calculated from the following:    Height as of this encounter: 165.1 cm (65\").    Weight as of this encounter: 67.8 kg (149 lb 6.4 oz).    BMI is within normal parameters. No other follow-up for BMI required.       Does the patient have evidence of cognitive impairment? No                                                                                               Health  Risk Assessment    Smoking Status:  Social History     Tobacco Use   Smoking Status Every Day    Current packs/day: 1.00    Average packs/day: 1 pack/day for 55.8 years (55.8 ttl pk-yrs)    Types: Cigarettes    Start date: 1969    Passive exposure: Current   Smokeless Tobacco Never     Alcohol Consumption:  Social History     Substance and Sexual Activity   Alcohol Use No    Comment: \"not in 30 years\"       Fall Risk Screen  STEADI Fall Risk Assessment was completed, and patient is at LOW risk for falls.Assessment completed on:10/7/2024    Depression Screening:      10/7/2024    11:21 AM   PHQ-2/PHQ-9 Depression Screening   Little Interest or Pleasure in Doing Things 0-->not at all   Feeling Down, Depressed or Hopeless 0-->not at all   PHQ-9: Brief Depression Severity Measure Score 0     Health Habits and Functional and Cognitive Screening:      10/7/2024    11:19 AM   Functional & Cognitive Status   Do you have difficulty preparing food and eating? No   Do you have difficulty bathing yourself, getting dressed or grooming yourself? No   Do you have difficulty using the toilet? No   Do you have difficulty moving around from place to place? No   Do you have trouble with steps " or getting out of a bed or a chair? No   Current Diet Well Balanced Diet   Dental Exam Other   Eye Exam Up to date   Exercise (times per week) 3 times per week   Current Exercises Include Walking   Do you need help using the phone?  No   Are you deaf or do you have serious difficulty hearing?  Yes   Do you need help to go to places out of walking distance? Yes   Do you need help shopping? No   Do you need help preparing meals?  No   Do you need help with housework?  No   Do you need help with laundry? No   Do you need help taking your medications? No   Do you need help managing money? No   Do you ever drive or ride in a car without wearing a seat belt? No   Have you felt unusual stress, anger or loneliness in the last month? Yes   Who do you live with? Child   If you need help, do you have trouble finding someone available to you? No   Have you been bothered in the last four weeks by sexual problems? No   Do you have difficulty concentrating, remembering or making decisions? No           Age-appropriate Screening Schedule:  Refer to the list below for future screening recommendations based on patient's age, sex and/or medical conditions. Orders for these recommended tests are listed in the plan section. The patient has been provided with a written plan.    Health Maintenance List  Health Maintenance   Topic Date Due    DXA SCAN  Never done    TDAP/TD VACCINES (1 - Tdap) Never done    ZOSTER VACCINE (1 of 2) Never done    HEPATITIS C SCREENING  Never done    LIPID PANEL  03/24/2023    ANNUAL WELLNESS VISIT  11/16/2023    COVID-19 Vaccine (2 - 2023-24 season) 09/01/2024    Pneumococcal Vaccine 65+ (1 of 2 - PCV) 11/08/2024 (Originally 11/13/1961)    INFLUENZA VACCINE  03/31/2025 (Originally 8/1/2024)    MAMMOGRAM  10/07/2025 (Originally 1955)    COLORECTAL CANCER SCREENING  03/02/2027    LUNG CANCER SCREENING  Discontinued                                                                                                                                                 CMS Preventative Services Quick Reference  Risk Factors Identified During Encounter  None Identified    The above risks/problems have been discussed with the patient.  Pertinent information has been shared with the patient in the After Visit Summary.  An After Visit Summary and PPPS were made available to the patient.    Follow Up:   Next Medicare Wellness visit to be scheduled in 1 year.     Assessment & Plan  Gastroesophageal reflux disease with esophagitis without hemorrhage    Insomnia, unspecified type       New Medications Ordered This Visit   Medications    pantoprazole (PROTONIX) 20 MG EC tablet     Sig: Take 1 tablet by mouth 2 (Two) Times a Day. No further refills until seen in office.     Dispense:  180 tablet     Refill:  0    traZODone (DESYREL) 50 MG tablet     Sig: Take 1 tablet by mouth Every Night.     Dispense:  30 tablet     Refill:  2          Follow Up:   No follow-ups on file.

## 2024-10-23 ENCOUNTER — OFFICE VISIT (OUTPATIENT)
Dept: GASTROENTEROLOGY | Facility: CLINIC | Age: 69
End: 2024-10-23
Payer: MEDICARE

## 2024-10-23 VITALS
BODY MASS INDEX: 25.29 KG/M2 | WEIGHT: 152 LBS | OXYGEN SATURATION: 97 % | DIASTOLIC BLOOD PRESSURE: 80 MMHG | SYSTOLIC BLOOD PRESSURE: 122 MMHG | HEART RATE: 96 BPM

## 2024-10-23 DIAGNOSIS — Z86.0100 PERSONAL HISTORY OF COLON POLYPS, UNSPECIFIED: ICD-10-CM

## 2024-10-23 DIAGNOSIS — K21.00 GASTROESOPHAGEAL REFLUX DISEASE WITH ESOPHAGITIS WITHOUT HEMORRHAGE: Primary | Chronic | ICD-10-CM

## 2024-10-23 PROCEDURE — 1159F MED LIST DOCD IN RCRD: CPT | Performed by: NURSE PRACTITIONER

## 2024-10-23 PROCEDURE — 3074F SYST BP LT 130 MM HG: CPT | Performed by: NURSE PRACTITIONER

## 2024-10-23 PROCEDURE — 1160F RVW MEDS BY RX/DR IN RCRD: CPT | Performed by: NURSE PRACTITIONER

## 2024-10-23 PROCEDURE — 99213 OFFICE O/P EST LOW 20 MIN: CPT | Performed by: NURSE PRACTITIONER

## 2024-10-23 PROCEDURE — 3079F DIAST BP 80-89 MM HG: CPT | Performed by: NURSE PRACTITIONER

## 2024-10-23 RX ORDER — PANTOPRAZOLE SODIUM 20 MG/1
20 TABLET, DELAYED RELEASE ORAL 2 TIMES DAILY
Qty: 180 TABLET | Refills: 3 | Status: SHIPPED | OUTPATIENT
Start: 2024-10-23 | End: 2024-10-24 | Stop reason: DRUGHIGH

## 2024-10-23 NOTE — PROGRESS NOTES
Follow Up Note     Date: 10/23/2024   Patient Name: Itzel Hagen  MRN: [id number]  : 1955     Primary Care Provider: Leonie Hdz APRN     Chief Complaint   Patient presents with    Heartburn     10/23/2024  History of Present Illness  The patient is a 68-year-old female here for a follow-up of reflux.    She reports that her reflux symptoms are generally well-managed, with occasional flare-ups, particularly at night. She is currently on a regimen of pantoprazole twice daily but is considering reducing the dosage to once daily, preferably in the morning, to see if it can sustain her through the night. She does not have any difficulty swallowing, abdominal pain, nausea, or vomiting.    She also mentions that she has been under significant stress due to personal losses over the past three years, which she believes may be exacerbating her reflux symptoms. No constipation, diarrhea or GI bleeding.      Interval History:  2022  Itzel Hagen is a 66 y.o. female who is here today for follow up after procedures.      She has been doing well. Reflux is well controlled with low dose PPI twice a day. She has tried to decrease to once a day but reflux is severe. No constipation or diarrhea. Denies GI bleeding. She has follow up with pulmonology in a few weeks to discuss nodules noted on CT scan.     2020  There is a long standing history of heartburn. Heartburn is severe. Reflux is worse at night. The patient is taking Pantoprazole once a day and and Pepcid twice a day with moderate control of heartburn.      The patient denies recent change in bowel habits. There is no diarrhea or constipation. There is no history of abdominal pain. There is no history of overt GI bleed (hematemesis melena or hematochezia). The patient denies nausea or vomiting. The patient denies dysphagia or odynophagia. There is no history of recent significant weight loss. There is no history of liver disease in the  past. There is no family history of colon cancer. The patient has not had a colonoscopy in the past.     She complains of coughing and heartburn. She reports no abdominal pain, no chest pain or no nausea. This is a chronic problem. Episode onset: over 5 years ago. The problem occurs occasionally. The problem has been gradually improving. The heartburn duration is an hour. The heartburn is located in the substernum. The heartburn is of severe intensity. The heartburn wakes her from sleep. Nothing aggravates the symptoms. Pertinent negatives include no fatigue. There are no known risk factors. She has tried a histamine-2 antagonist and a PPI for the symptoms. The treatment provided moderate relief. Past procedures include an abdominal ultrasound (2019) and an EGD (10/15/2019).     Subjective      Past Medical History:   Diagnosis Date    Acute pancreatitis     Angina, class III     Back pain     Bilateral cataracts     Chest pain     Chronic bronchitis     Cigarette nicotine dependence     COPD (chronic obstructive pulmonary disease)     Current smoker     Ear piercing     Essential hypertension 8/3/2021    Full dentures     GERD (gastroesophageal reflux disease)     H/O cardiac catheterization 09/06/2019    Dr. Izquierdo, Conclusion: 1.Overall mild coronary artery disease with right dominant system.    Heart palpitations     History of nuclear stress test 08/28/2019    ·Left ventricular ejection fraction is hyperdynamic (Calculated EF > 70%). Impressions are consistent with a low risk study.    Hyperlipidemia     PONV (postoperative nausea and vomiting)     Precordial chest pain     SOB (shortness of breath)      Past Surgical History:   Procedure Laterality Date    APPENDECTOMY  1971    BACK SURGERY  2007    Removed bone spurs/cleaned up the area    CARDIAC CATHETERIZATION N/A 09/06/2019    Procedure: Left Heart Cath;  Surgeon: Dre Izquierdo MD;  Location: [facility] CATH INVASIVE LOCATION;  Service: Cardiology     "CATARACT EXTRACTION Bilateral 2022     SECTION  ,     CHOLECYSTECTOMY  2014    + stones    COLONOSCOPY N/A 2022    Procedure: COLONOSCOPY with biopsies , polypectomy x3;  Surgeon: Antonio Macias MD;  Location: [facility] ENDOSCOPY;  Service: Gastroenterology;  Laterality: N/A;    ENDOSCOPY      ENDOSCOPY N/A 10/15/2019    Procedure: ESOPHAGOGASTRODUODENOSCOPY WITH BIOPSY AND ESOPHAGEAL DILATATION;  Surgeon: Ambrocio Solorzano MD;  Location: [facility] ENDOSCOPY;  Service: Gastroenterology    ENDOSCOPY N/A 2022    Procedure: ESOPHAGOGASTRODUODENOSCOPY with biopsy;  Surgeon: Antonio Macias MD;  Location: [facility] ENDOSCOPY;  Service: Gastroenterology;  Laterality: N/A;    HYSTERECTOMY      SHOULDER ACROMIOPLASTY  1998    TOOTH EXTRACTION      UPPER GASTROINTESTINAL ENDOSCOPY  10/15/2019     Family History   Problem Relation Age of Onset    Heart disease Mother     Diabetes Mother     Heart disease Father     Heart disease Brother     Cancer Maternal Grandfather     Diabetes Maternal Grandfather     Diabetes Maternal Uncle     Cirrhosis Neg Hx     Liver cancer Neg Hx     Liver disease Neg Hx     Colon cancer Neg Hx     Crohn's disease Neg Hx     Ulcerative colitis Neg Hx     Esophageal cancer Neg Hx     Stomach cancer Neg Hx      Social History     Socioeconomic History    Marital status:    Tobacco Use    Smoking status: Every Day     Current packs/day: 1.00     Average packs/day: 1 pack/day for 55.8 years (55.8 ttl pk-yrs)     Types: Cigarettes     Start date:      Passive exposure: Current    Smokeless tobacco: Never   Vaping Use    Vaping status: Never Used   Substance and Sexual Activity    Alcohol use: No     Comment: \"not in 30 years\"    Drug use: No    Sexual activity: Not Currently     Partners: Male     Birth control/protection: Hysterectomy       Current Outpatient Medications:     albuterol sulfate  (90 Base) MCG/ACT inhaler, Inhale 2 puffs Every 4 (Four) " Hours As Needed for Wheezing., Disp: 6.7 g, Rfl: 5    ascorbic acid (VITAMIN C) 1000 MG tablet, Take 1 tablet by mouth Daily., Disp: , Rfl:     aspirin 81 MG EC tablet, Take 1 tablet by mouth Daily., Disp: 90 tablet, Rfl: 2    atorvastatin (LIPITOR) 80 MG tablet, Take 1 tablet by mouth Daily., Disp: 90 tablet, Rfl: 3    B Complex Vitamins (VITAMIN B COMPLEX PO), Take 2 capsules by mouth Daily., Disp: , Rfl:     buPROPion (ZYBAN) 150 MG 12 hr tablet, TAKE 1 TABLET BY MOUTH TWICE A DAY, Disp: 60 tablet, Rfl: 5    cholecalciferol (VITAMIN D3) 25 MCG (1000 UT) tablet, Take 5 tablets by mouth Daily., Disp: , Rfl:     doxycycline (VIBRAMYCIN) 100 MG capsule, Take 1 capsule by mouth 2 (Two) Times a Day., Disp: 20 capsule, Rfl: 0    ipratropium-albuterol (DUO-NEB) 0.5-2.5 mg/3 ml nebulizer, Take 3 mL by nebulization 4 (Four) Times a Day As Needed for Wheezing., Disp: 360 mL, Rfl: 5    isosorbide mononitrate (IMDUR) 120 MG 24 hr tablet, Take 1 tablet by mouth Every Morning., Disp: 90 tablet, Rfl: 3    metoprolol succinate XL (TOPROL-XL) 25 MG 24 hr tablet, Take 1 tablet by mouth Daily., Disp: 90 tablet, Rfl: 3    nicotine (NICODERM CQ) 21 MG/24HR patch, Place 1 patch on the skin as directed by provider Daily., Disp: 28 each, Rfl: 1    nitroglycerin (NITROSTAT) 0.4 MG SL tablet, Place 1 tablet under the tongue Every 5 (Five) Minutes As Needed for Chest Pain. Take no more than 3 doses in 15 minutes., Disp: 25 tablet, Rfl: 0    pantoprazole (PROTONIX) 20 MG EC tablet, Take 1 tablet by mouth 2 (Two) Times a Day., Disp: 180 tablet, Rfl: 3    predniSONE (DELTASONE) 10 MG tablet, 6 daily x 2 days, 5 daily x 2 days, 4 daily x 2 days, 3 daily x 2 days, 2 daily x 2 days, 1 daily x 2 days, Disp: 42 tablet, Rfl: 0    traZODone (DESYREL) 50 MG tablet, Take 1 tablet by mouth Every Night., Disp: 30 tablet, Rfl: 2    Umeclidinium-Vilanterol (Anoro Ellipta) 62.5-25 MCG/ACT aerosol powder  inhaler, Inhale 1 puff Daily., Disp: 60 each, Rfl:  5    Allergies   Allergen Reactions    Penicillins Anaphylaxis    Coconut Swelling and Rash     Tongue swells    Codeine Rash    Morphine Swelling     The following portions of the patient's history were reviewed and updated as appropriate: allergies, current medications, past family history, past medical history, past social history, past surgical history and problem list.  Objective     Physical Exam  Vitals and nursing note reviewed.   Constitutional:       General: She is not in acute distress.     Appearance: Normal appearance. She is well-developed.   HENT:      Head: Normocephalic and atraumatic.      Mouth/Throat:      Mouth: Mucous membranes are not pale, not dry and not cyanotic.   Eyes:      General: Lids are normal.   Neck:      Trachea: Trachea normal.   Cardiovascular:      Rate and Rhythm: Normal rate.   Pulmonary:      Effort: Pulmonary effort is normal. No respiratory distress.      Breath sounds: Normal breath sounds.   Abdominal:      Tenderness: There is no abdominal tenderness.   Skin:     General: Skin is warm and dry.   Neurological:      Mental Status: She is alert and oriented to person, place, and time.   Psychiatric:         Mood and Affect: Mood normal.         Speech: Speech normal.         Behavior: Behavior normal. Behavior is cooperative.       Vitals:    10/23/24 1439   BP: 122/80   Pulse: 96   SpO2: 97%   Weight: 68.9 kg (152 lb)     Body mass index is 25.29 kg/m².     Results Review:   I reviewed the patient's new clinical results.    Lab on 10/07/2024   Component Date Value Ref Range Status    WBC 10/07/2024 7.26  3.40 - 10.80 10*3/mm3 Final    RBC 10/07/2024 4.89  3.77 - 5.28 10*6/mm3 Final    Hemoglobin 10/07/2024 15.7  12.0 - 15.9 g/dL Final    Hematocrit 10/07/2024 45.8  34.0 - 46.6 % Final    MCV 10/07/2024 93.7  79.0 - 97.0 fL Final    MCH 10/07/2024 32.1  26.6 - 33.0 pg Final    MCHC 10/07/2024 34.3  31.5 - 35.7 g/dL Final    RDW 10/07/2024 12.4  12.3 - 15.4 % Final     RDW-SD 10/07/2024 42.6  37.0 - 54.0 fl Final    MPV 10/07/2024 11.1  6.0 - 12.0 fL Final    Platelets 10/07/2024 260  140 - 450 10*3/mm3 Final    Glucose 10/07/2024 76  65 - 99 mg/dL Final    BUN 10/07/2024 8  8 - 23 mg/dL Final    Creatinine 10/07/2024 0.90  0.57 - 1.00 mg/dL Final    Sodium 10/07/2024 138  136 - 145 mmol/L Final    Potassium 10/07/2024 4.4  3.5 - 5.2 mmol/L Final    Chloride 10/07/2024 101  98 - 107 mmol/L Final    CO2 10/07/2024 29.0  22.0 - 29.0 mmol/L Final    Calcium 10/07/2024 10.0  8.6 - 10.5 mg/dL Final    Total Protein 10/07/2024 6.7  6.0 - 8.5 g/dL Final    Albumin 10/07/2024 4.0  3.5 - 5.2 g/dL Final    ALT (SGPT) 10/07/2024 13  1 - 33 U/L Final    AST (SGOT) 10/07/2024 21  1 - 32 U/L Final    Alkaline Phosphatase 10/07/2024 94  39 - 117 U/L Final    Total Bilirubin 10/07/2024 0.3  0.0 - 1.2 mg/dL Final    Globulin 10/07/2024 2.7  gm/dL Final    A/G Ratio 10/07/2024 1.5  g/dL Final    BUN/Creatinine Ratio 10/07/2024 8.9  7.0 - 25.0 Final    Anion Gap 10/07/2024 8.0  5.0 - 15.0 mmol/L Final    eGFR 10/07/2024 70.2  >60.0 mL/min/1.73 Final    TSH 10/07/2024 3.620  0.270 - 4.200 uIU/mL Final    Hemoglobin A1C 10/07/2024 5.40  4.80 - 5.60 % Final    Total Cholesterol 10/07/2024 149  0 - 200 mg/dL Final    Triglycerides 10/07/2024 93  0 - 150 mg/dL Final    HDL Cholesterol 10/07/2024 55  40 - 60 mg/dL Final    LDL Cholesterol  10/07/2024 77  0 - 100 mg/dL Final    VLDL Cholesterol 10/07/2024 17  5 - 40 mg/dL Final    LDL/HDL Ratio 10/07/2024 1.37   Final      CT Abdomen Pelvis With Contrast     Result Date: 3/24/2022   1. Two tiny 3.3 mm nodules in the right lung.  2.Colonic wall thickening, likely a nonspecific colitis.  3. Other findings as above.      CT Chest Low Dose Cancer Screening WO     Result Date: 9/26/2024  Lung RADS 2 benign findings  RECOMMENDATIONS:Annual low-dose chest CT.       EGD dated 10/15/2019 per Dr. Solorzano  - Erosive distal esophagitis.  LA class C.    - Small sliding  hiatal hernia less than 3 cm.    - Schatzki's ring.  Subtle concentric rings involving the mid and distal esophagus.  Status post serial dilation to 18 mm.    - Erythematous gastritis.  Duodenum second portion biopsy reveals benign duodenal mucosa with no diagnostic abnormality.  Antrum body and fundus biopsy reveals reactive gastropathy.  Mid and distal esophagus biopsies revealed reactive squamous mucosa.     EGD dated 3/2/2022 per Dr. Macias  - Normal oropharynx.  - Z-line irregular, 35 cm from the incisors.  - Esophageal mucosal changes suspicious for Castillo's esophagus. Biopsied.  - 2 cm hiatal hernia.  - Erythematous mucosa in the antrum and prepyloric region of the stomach. Biopsied.  - Normal first portion of the duodenum, second portion of the duodenum and third portion of the duodenum.  - Erythematous duodenopathy.  -Gastric body biopsy with reactive gastropathy.  Distal esophagus biopsy with minimal chronic gastritis, no intestinal metaplasia.     Colonoscopy dated 3/2/2022 per Dr. Macias  - One 6 to 8 mm polyp in the proximal ascending colon, removed with a hot snare. Resected and retrieved.  - Two 2 to 3 mm polyps at the recto-sigmoid colon, removed with a cold snare. Resected and retrieved.  - Diverticulosis in the sigmoid colon and in the descending colon.  - The examined portion of the ileum was normal. Biopsied.  - Anal papilla(e) were hypertrophied.  - Non-bleeding external hemorrhoids.  - Biopsies were taken with a cold forceps from the right colon, left colon and transverse colon for evaluation of microscopic colitis, abnormal CTAP.  -Terminal ileum biopsy unremarkable.  Random colon biopsy unremarkable.  Ascending colon polyp and rectosigmoid colon polyp biopsies with tubular adenomas, hyperplastic polyp.    Assessment / Plan      1. Gastroesophageal reflux disease with esophagitis without hemorrhage  Reflux reasonably controlled with pantoprazole 20 mg twice a day.  Denies any difficulty  swallowing. EGD 3/2/2022 with changes suspicious for Castillo's, biopsies with minimal chronic gastritis, no intestinal metaplasia.  Antireflux measures.  Patient wishes to try decreasing to pantoprazole 20 mg once a day. She will go back to twice a day if reflux is severe.    - pantoprazole (PROTONIX) 20 MG EC tablet; Take 1 tablet by mouth 2 (Two) Times a Day.  Dispense: 180 tablet; Refill: 3    2. Personal history of colon polyps, unspecified  Colonoscopy 3/2/2022 with polyps removed, tubular adenomas without dysplasia.  No family history of colon cancer.   Colonoscopy for surveillance in 5 years, 2027.    Patient Instructions   Antireflux measures: Avoid fried, fatty foods, alcohol, chocolate, coffee, tea,  soft drinks, peppermint and spearmint, spicy foods, tomatoes and tomato based foods, onions, peppers, and smoking.   Other antireflux measures include weight reduction if overweight, avoiding tight clothing around the abdomen, elevating the head of the bed 6 inches with blocks under the head board, and don't drink or eat before going to bed and avoid lying down immediately after meals.  Avoid vaping/smoking.  Pantoprazole 20 mg 1 by mouth twice a day for reflux.   High fiber, low fat diet with liberal water intake.   Colonoscopy for surveillance in 5 years, 2027.  Follow up: 1 year or sooner if needed    DEWAYNE Kim  10/23/2024    Please note that portions of this note were completed with a voice recognition program.     Patient or patient representative verbalized consent for the use of Ambient Listening during the visit with  DEWAYNE Kim for chart documentation. 10/23/2024  15:01 EDT

## 2024-10-24 ENCOUNTER — TELEPHONE (OUTPATIENT)
Dept: GASTROENTEROLOGY | Facility: CLINIC | Age: 69
End: 2024-10-24
Payer: MEDICARE

## 2024-10-24 DIAGNOSIS — K21.00 GASTROESOPHAGEAL REFLUX DISEASE WITH ESOPHAGITIS WITHOUT HEMORRHAGE: Primary | ICD-10-CM

## 2024-10-24 RX ORDER — PANTOPRAZOLE SODIUM 40 MG/1
TABLET, DELAYED RELEASE ORAL
Qty: 180 TABLET | Refills: 3 | Status: SHIPPED | OUTPATIENT
Start: 2024-10-24

## 2024-10-24 NOTE — TELEPHONE ENCOUNTER
----- Message from Maureen LANDA sent at 10/24/2024 12:56 PM EDT -----  She would like to try the 40 mg  ----- Message -----  From: Lori Linares APRN  Sent: 10/24/2024  12:48 PM EDT  To: Maureen Lopez MA    Can you call her and let her know her insurance will only cover it for once a day? I can either send in the lower dose 20 mg once per day, or if she feels she needs the higher dose, I can send in the 40 mg once per day. Just let me know.  ----- Message -----  From: Maureen Lopez MA  Sent: 10/24/2024   9:42 AM EDT  To: DEWAYNE Yates    Patient's insurance will only cover Pantoprazole once daily

## 2024-12-03 ENCOUNTER — OFFICE VISIT (OUTPATIENT)
Dept: FAMILY MEDICINE CLINIC | Facility: CLINIC | Age: 69
End: 2024-12-03
Payer: MEDICARE

## 2024-12-03 VITALS
OXYGEN SATURATION: 97 % | DIASTOLIC BLOOD PRESSURE: 80 MMHG | TEMPERATURE: 98 F | BODY MASS INDEX: 25.59 KG/M2 | HEIGHT: 65 IN | SYSTOLIC BLOOD PRESSURE: 138 MMHG | WEIGHT: 153.6 LBS | HEART RATE: 73 BPM

## 2024-12-03 DIAGNOSIS — R25.1 TREMOR: Primary | ICD-10-CM

## 2024-12-03 PROCEDURE — 3075F SYST BP GE 130 - 139MM HG: CPT | Performed by: FAMILY MEDICINE

## 2024-12-03 PROCEDURE — 1126F AMNT PAIN NOTED NONE PRSNT: CPT | Performed by: FAMILY MEDICINE

## 2024-12-03 PROCEDURE — 99213 OFFICE O/P EST LOW 20 MIN: CPT | Performed by: FAMILY MEDICINE

## 2024-12-03 PROCEDURE — 3079F DIAST BP 80-89 MM HG: CPT | Performed by: FAMILY MEDICINE

## 2024-12-05 NOTE — PROGRESS NOTES
"Chief Complaint  Shaking    Neymar Hagen presents to [facility]  History of Present Illness    Patient states she has developed a mild tremor when she goes to pick things up at times it is not there at rest.  However she does have a family history of Parkinson's she would like to be checked for this.  States the tremor has been ongoing for the past year.    Review of Systems      Objective   Vital Signs:   /80 (BP Location: Right arm, Patient Position: Sitting, Cuff Size: Adult)   Pulse 73   Temp 98 °F (36.7 °C) (Temporal)   Ht 165.1 cm (65\")   Wt 69.7 kg (153 lb 9.6 oz)   SpO2 97%   BMI 25.56 kg/m²     Physical Exam  Constitutional:       General: She is not in acute distress.     Appearance: Normal appearance. She is well-developed and well-groomed. She is not ill-appearing, toxic-appearing or diaphoretic.   HENT:      Head: Normocephalic.      Nose: Nose normal. No congestion or rhinorrhea.      Mouth/Throat:      Mouth: Mucous membranes are moist.      Pharynx: Oropharynx is clear. No oropharyngeal exudate or posterior oropharyngeal erythema.   Eyes:      General: Lids are normal.         Right eye: No discharge.         Left eye: No discharge.      Extraocular Movements: Extraocular movements intact.      Pupils: Pupils are equal, round, and reactive to light.   Neck:      Vascular: No carotid bruit.   Cardiovascular:      Rate and Rhythm: Normal rate and regular rhythm.      Pulses: Normal pulses.      Heart sounds: Normal heart sounds. No murmur heard.     No friction rub. No gallop.   Pulmonary:      Effort: Pulmonary effort is normal. No respiratory distress.      Breath sounds: Normal breath sounds. No stridor. No wheezing, rhonchi or rales.   Chest:      Chest wall: No tenderness.   Abdominal:      General: Bowel sounds are normal. There is no distension.      Palpations: Abdomen is soft. There is no mass.      Tenderness: There is no " abdominal tenderness. There is no right CVA tenderness, left CVA tenderness, guarding or rebound.      Hernia: No hernia is present.   Musculoskeletal:         General: No swelling or tenderness. Normal range of motion.      Cervical back: Normal range of motion and neck supple. No rigidity or tenderness.      Right lower leg: No edema.      Left lower leg: No edema.   Lymphadenopathy:      Cervical: No cervical adenopathy.   Skin:     General: Skin is warm.      Capillary Refill: Capillary refill takes less than 2 seconds.      Coloration: Skin is not jaundiced.      Findings: No bruising, erythema or rash.   Neurological:      General: No focal deficit present.      Mental Status: She is alert and oriented to person, place, and time.      Motor: Motor function is intact. No weakness.      Coordination: Coordination is intact.      Gait: Gait is intact. Gait normal.   Psychiatric:         Attention and Perception: Attention normal.         Mood and Affect: Mood normal.         Speech: Speech normal.         Behavior: Behavior normal.         Cognition and Memory: Cognition normal.         Judgment: Judgment normal.        Result Review :                 Assessment and Plan    Diagnoses and all orders for this visit:    1. Tremor (Primary)  -     Ambulatory Referral to Neurology      Patient's Body mass index is 25.56 kg/m². indicating that she is overweight (BMI 25-29.9). Patient's (Body mass index is 25.56 kg/m².) indicates that they are overweight with health conditions that include hypertension and dyslipidemias . Weight is unchanged. BMI is above average; BMI management plan is completed. We discussed low calorie, low carb based diet program, portion control, and increasing exercise. .    Follow Up   No follow-ups on file.  Patient was given instructions and counseling regarding her condition or for health maintenance advice. Please see specific information pulled into the AVS if appropriate.     This document  has been electronically signed by DEWAYNE Dunbar  December 5, 2024 16:34 EST

## 2025-01-16 ENCOUNTER — OFFICE VISIT (OUTPATIENT)
Dept: NEUROLOGY | Facility: CLINIC | Age: 70
End: 2025-01-16
Payer: MEDICARE

## 2025-01-16 VITALS
RESPIRATION RATE: 14 BRPM | SYSTOLIC BLOOD PRESSURE: 146 MMHG | OXYGEN SATURATION: 98 % | BODY MASS INDEX: 25.92 KG/M2 | HEIGHT: 65 IN | TEMPERATURE: 97.5 F | HEART RATE: 68 BPM | WEIGHT: 155.6 LBS | DIASTOLIC BLOOD PRESSURE: 68 MMHG

## 2025-01-16 DIAGNOSIS — G25.0 ESSENTIAL TREMOR: Primary | ICD-10-CM

## 2025-01-16 RX ORDER — PRIMIDONE 50 MG/1
25 TABLET ORAL NIGHTLY
Qty: 30 TABLET | Refills: 3 | Status: SHIPPED | OUTPATIENT
Start: 2025-01-16

## 2025-01-16 NOTE — PROGRESS NOTES
"     New Patient Office Visit      Patient Name: Itzel Hagen  : 1955   MRN: [id number]     Referring Physician: Leonie Hdz APRN    Chief Complaint:    Chief Complaint   Patient presents with    Establish Care     Patient is in office to establish care for tremors. She has had tremors in both hands and legs for a few years.        History of Present Illness: Itzel Hagen is a 69 y.o. female who is here today to establish care with Neurology.  She is a former pt of Neurology (unknown provider) for back issue in [place], [place]. She was referred to us from PCP (Wilder) for tremor.     Gradual onset of tremor 6-7 years ago. Started on right hand and now in bilateral hands. Right hand dominate. Tremor is course and occurs with movement. Tremor can be moderate. She feels the tremor is worse with writing. Tremor intensifies as she nears target. When she writes she has to hold her hand down so it will stay on the paper. She is starting to feel tremors in her legs. Tremor is worse with action.  + double taps the keys of her cell phone. She is able to turn over in bed without assistance. + propulsion. + retropulsion. + bradykinesia x 2 years \" I move slower than I used to!\"  -shuffling. She has never taken medication for tremor in the past. Does take BB (Metoprolol) for HTN and this is managed by PCP. She recently had TSH 3.62 (10/24).     SOCIAL: . Lives with her daughter, Alice. Lives in [place], [place] ([place] the country[place] She is a retired truck diver. Current 1-3 PPD x 50 years with current weaning down. No ETOH. No vaping. No recreational drugs or cannabis. Form [employer]- she did have secondhand exposure to Agent Orange.     [facility] Neuro: Mother- dementia. Father- parkinsonism, dementia    Recent Imaging: NONE.     Pertinent Medical History: Hypertension, CAD, gastritis, pancreatitis, GERD, erosive esophagitis, tobacco use, COPD    Subjective      Review of Systems:   Review of Systems "   Constitutional: Negative.    HENT: Negative.     Eyes: Negative.    Respiratory: Negative.     Cardiovascular: Negative.    Gastrointestinal: Negative.    Endocrine: Negative.    Genitourinary: Negative.    Musculoskeletal: Negative.    Skin: Negative.    Allergic/Immunologic: Negative.    Neurological:  Positive for tremors.   Hematological: Negative.    Psychiatric/Behavioral: Negative.     All other systems reviewed and are negative.      Past Medical History:   Past Medical History:   Diagnosis Date    Acute pancreatitis     Angina, class III     Back pain     Bilateral cataracts     Chest pain     Chronic bronchitis     Cigarette nicotine dependence     COPD (chronic obstructive pulmonary disease)     Current smoker     Ear piercing     Essential hypertension 8/3/2021    Full dentures     GERD (gastroesophageal reflux disease)     H/O cardiac catheterization 2019    Dr. Izquierdo, Conclusion: 1.Overall mild coronary artery disease with right dominant system.    Heart palpitations     History of nuclear stress test 2019    ·Left ventricular ejection fraction is hyperdynamic (Calculated EF > 70%). Impressions are consistent with a low risk study.    Hyperlipidemia     PONV (postoperative nausea and vomiting)     Precordial chest pain     SOB (shortness of breath)        Past Surgical History:   Past Surgical History:   Procedure Laterality Date    APPENDECTOMY  1971    BACK SURGERY      Removed bone spurs/cleaned up the area    CARDIAC CATHETERIZATION N/A 2019    Procedure: Left Heart Cath;  Surgeon: Dre Izquierdo MD;  Location: [facility] CATH INVASIVE LOCATION;  Service: Cardiology    CATARACT EXTRACTION Bilateral 2022     SECTION  ,     CHOLECYSTECTOMY      + stones    COLONOSCOPY N/A 2022    Procedure: COLONOSCOPY with biopsies , polypectomy x3;  Surgeon: Antonio Macias MD;  Location: [facility] ENDOSCOPY;  Service: Gastroenterology;  Laterality: N/A;     "ENDOSCOPY      ENDOSCOPY N/A 10/15/2019    Procedure: ESOPHAGOGASTRODUODENOSCOPY WITH BIOPSY AND ESOPHAGEAL DILATATION;  Surgeon: Ambrocio Solorzano MD;  Location: [facility] ENDOSCOPY;  Service: Gastroenterology    ENDOSCOPY N/A 03/02/2022    Procedure: ESOPHAGOGASTRODUODENOSCOPY with biopsy;  Surgeon: Antonio Macias MD;  Location: [facility] ENDOSCOPY;  Service: Gastroenterology;  Laterality: N/A;    HYSTERECTOMY      SHOULDER ACROMIOPLASTY  1998    TOOTH EXTRACTION      UPPER GASTROINTESTINAL ENDOSCOPY  10/15/2019       Family History:   Family History   Problem Relation Age of Onset    Heart disease Mother     Diabetes Mother     Dementia Mother     Stroke Mother     Heart disease Father     Parkinsonism Father     Dementia Father     Heart disease Brother     Diabetes Maternal Uncle     Cancer Maternal Grandfather     Diabetes Maternal Grandfather     Cirrhosis Neg Hx     Liver cancer Neg Hx     Liver disease Neg Hx     Colon cancer Neg Hx     Crohn's disease Neg Hx     Ulcerative colitis Neg Hx     Esophageal cancer Neg Hx     Stomach cancer Neg Hx        Social History:   Social History     Socioeconomic History    Marital status:    Tobacco Use    Smoking status: Every Day     Current packs/day: 1.00     Average packs/day: 1 pack/day for 56.0 years (56.0 ttl pk-yrs)     Types: Cigarettes     Start date: 1969     Passive exposure: Current    Smokeless tobacco: Never   Vaping Use    Vaping status: Never Used   Substance and Sexual Activity    Alcohol use: No     Comment: \"not in 30 years\"    Drug use: No    Sexual activity: Not Currently     Partners: Male     Birth control/protection: Hysterectomy       Medications:     Current Outpatient Medications:     albuterol sulfate  (90 Base) MCG/ACT inhaler, Inhale 2 puffs Every 4 (Four) Hours As Needed for Wheezing., Disp: 6.7 g, Rfl: 5    ascorbic acid (VITAMIN C) 1000 MG tablet, Take 1 tablet by mouth Daily., Disp: , Rfl:     aspirin 81 MG EC tablet, " Take 1 tablet by mouth Daily., Disp: 90 tablet, Rfl: 2    atorvastatin (LIPITOR) 80 MG tablet, Take 1 tablet by mouth Daily., Disp: 90 tablet, Rfl: 3    B Complex Vitamins (VITAMIN B COMPLEX PO), Take 2 capsules by mouth Daily., Disp: , Rfl:     cholecalciferol (VITAMIN D3) 25 MCG (1000 UT) tablet, Take 5 tablets by mouth Daily., Disp: , Rfl:     doxycycline (VIBRAMYCIN) 100 MG capsule, Take 1 capsule by mouth 2 (Two) Times a Day., Disp: 20 capsule, Rfl: 0    ipratropium-albuterol (DUO-NEB) 0.5-2.5 mg/3 ml nebulizer, Take 3 mL by nebulization 4 (Four) Times a Day As Needed for Wheezing., Disp: 360 mL, Rfl: 5    isosorbide mononitrate (IMDUR) 120 MG 24 hr tablet, Take 1 tablet by mouth Every Morning., Disp: 90 tablet, Rfl: 3    metoprolol succinate XL (TOPROL-XL) 25 MG 24 hr tablet, Take 1 tablet by mouth Daily., Disp: 90 tablet, Rfl: 3    nicotine (NICODERM CQ) 21 MG/24HR patch, Place 1 patch on the skin as directed by provider Daily., Disp: 28 each, Rfl: 1    nitroglycerin (NITROSTAT) 0.4 MG SL tablet, Place 1 tablet under the tongue Every 5 (Five) Minutes As Needed for Chest Pain. Take no more than 3 doses in 15 minutes., Disp: 25 tablet, Rfl: 0    pantoprazole (PROTONIX) 40 MG EC tablet, 1 po daily in the am 30 minutes before breakfast, Disp: 180 tablet, Rfl: 3    predniSONE (DELTASONE) 10 MG tablet, 6 daily x 2 days, 5 daily x 2 days, 4 daily x 2 days, 3 daily x 2 days, 2 daily x 2 days, 1 daily x 2 days, Disp: 42 tablet, Rfl: 0    Umeclidinium-Vilanterol (Anoro Ellipta) 62.5-25 MCG/ACT aerosol powder  inhaler, Inhale 1 puff Daily., Disp: 60 each, Rfl: 5    primidone (MYSOLINE) 50 MG tablet, Take 0.5 tablets by mouth Every Night., Disp: 30 tablet, Rfl: 3    traZODone (DESYREL) 50 MG tablet, Take 1 tablet by mouth Every Night. (Patient not taking: Reported on 1/16/2025), Disp: 30 tablet, Rfl: 2    Allergies:   Allergies   Allergen Reactions    Penicillins Anaphylaxis    Coconut Swelling and Rash     Tongue swells  "   Codeine Rash    Morphine Swelling       Objective     Physical Exam:  Vital Signs:   Vitals:    01/16/25 1351   BP: 146/68   BP Location: Right arm   Patient Position: Sitting   Cuff Size: Adult   Pulse: 68   Resp: 14   Temp: 97.5 °F (36.4 °C)   TempSrc: Infrared   SpO2: 98%   Weight: 70.6 kg (155 lb 9.6 oz)   Height: 165.1 cm (65\")   PainSc:   2   PainLoc: Leg  Comment: righ     Body mass index is 25.89 kg/m².     Physical Exam  Vitals and nursing note reviewed.   Constitutional:       General: She is not in acute distress.     Appearance: Normal appearance.   HENT:      Head: Normocephalic.      Nose: Nose normal.      Mouth/Throat:      Mouth: Mucous membranes are moist.      Pharynx: Oropharynx is clear.   Eyes:      General: Lids are normal.      Extraocular Movements: Extraocular movements intact.      Conjunctiva/sclera: Conjunctivae normal.      Pupils: Pupils are equal, round, and reactive to light.   Musculoskeletal:      Cervical back: Normal range of motion and neck supple.   Skin:     General: Skin is warm and dry.      Capillary Refill: Capillary refill takes less than 2 seconds.   Neurological:      Mental Status: She is alert and oriented to person, place, and time.      Motor: Motor strength is normal.  Psychiatric:         Mood and Affect: Mood normal.         Behavior: Behavior normal.         Neurological Exam  Mental Status  Alert. Oriented to person, place, and time.    Cranial Nerves  CN II: Visual acuity is normal. Visual fields full to confrontation.  CN III, IV, VI: Extraocular movements intact bilaterally. Normal lids and orbits bilaterally. Pupils equal round and reactive to light bilaterally.  CN V: Facial sensation is normal.  CN VII: Full and symmetric facial movement.  CN IX, X: Palate elevates symmetrically. Normal gag reflex.  CN XI: Shoulder shrug strength is normal.  CN XII: Tongue midline without atrophy or fasciculations.    Motor  Normal muscle bulk throughout. No " fasciculations present. Normal muscle tone. The following abnormal movements were seen: Strength is 5/5 throughout all four extremities.  Action tremor with fine amplitude movement to bilateral hands. R>L. Faint fine tremor noted to chin. Tremor intensifies as she nears target. Normal blinking pattern. .    Sensory  Light touch is normal in upper and lower extremities.     Coordination  Right: Finger-to-nose normal. Rapid alternating movement normal.Left: Finger-to-nose normal. Rapid alternating movement normal.    Gait  Casual gait is normal including stance, stride, and arm swing. Able to rise from chair without using arms.  She is able to move from sitting to standing position without use of hands. She can ambulate 25 feet in 15 seconds with causal gait and adequate arm swing. No shuffling gait .      PHQ-9 Total Score:       Assessment / Plan      Assessment/Plan:   Diagnoses and all orders for this visit:    1. Essential tremor (Primary)  -     primidone (MYSOLINE) 50 MG tablet; Take 0.5 tablets by mouth Every Night.  Dispense: 30 tablet; Refill: 3  -     MRI Brain With & Without Contrast; Future         Follow Up:   Return in about 3 months (around 4/16/2025), or if symptoms worsen or fail to improve.    Anticipatory Guidance and Safety Reviewed  Patient Education Provided  MRI Brain W/WO r/o demyelinating disease  Begin Primidone 25 mg QHS; SE reviewed  Discussed symptoms of ET vs Parkinson Tremor from [facility]  Reviewed labs and dicussed     RTC PRN or within 12 weeks or sooner with issues      DEWAYNE Borja  [facility] Neurology and Sleep Medicine

## 2025-01-21 ENCOUNTER — PATIENT ROUNDING (BHMG ONLY) (OUTPATIENT)
Dept: NEUROLOGY | Facility: CLINIC | Age: 70
End: 2025-01-21
Payer: MEDICARE

## 2025-02-20 ENCOUNTER — TELEPHONE (OUTPATIENT)
Dept: NEUROLOGY | Facility: CLINIC | Age: 70
End: 2025-02-20

## 2025-02-20 DIAGNOSIS — G25.0 ESSENTIAL TREMOR: ICD-10-CM

## 2025-02-20 RX ORDER — ISOSORBIDE MONONITRATE 120 MG/1
120 TABLET, EXTENDED RELEASE ORAL EVERY MORNING
Qty: 90 TABLET | Refills: 0 | Status: SHIPPED | OUTPATIENT
Start: 2025-02-20

## 2025-02-20 NOTE — TELEPHONE ENCOUNTER
PATIENT CALLING TO ADVISE, MEDICATION primidone (MYSOLINE) 50 MG tablet NOT REALLY HELPING THAT MUCH - LITTLE CHANGE.    PLEASE ADVISE PATIENT NEXT STEP

## 2025-02-26 RX ORDER — ATORVASTATIN CALCIUM 80 MG/1
80 TABLET, FILM COATED ORAL DAILY
Qty: 90 TABLET | Refills: 0 | Status: SHIPPED | OUTPATIENT
Start: 2025-02-26

## 2025-03-04 ENCOUNTER — HOSPITAL ENCOUNTER (OUTPATIENT)
Dept: MRI IMAGING | Facility: HOSPITAL | Age: 70
Discharge: HOME | End: 2025-03-04
Admitting: NURSE PRACTITIONER
Payer: MEDICARE

## 2025-03-04 DIAGNOSIS — G25.0 ESSENTIAL TREMOR: ICD-10-CM

## 2025-03-04 PROCEDURE — 82565 ASSAY OF CREATININE: CPT

## 2025-03-04 PROCEDURE — A9577 INJ MULTIHANCE: HCPCS | Performed by: NURSE PRACTITIONER

## 2025-03-04 PROCEDURE — 70553 MRI BRAIN STEM W/O & W/DYE: CPT

## 2025-03-04 PROCEDURE — 25510000002 GADOBENATE DIMEGLUMINE 529 MG/ML SOLUTION: Performed by: NURSE PRACTITIONER

## 2025-03-04 RX ADMIN — GADOBENATE DIMEGLUMINE 12 ML: 529 INJECTION, SOLUTION INTRAVENOUS at 15:11

## 2025-03-05 LAB — CREAT BLDA-MCNC: 1 MG/DL (ref 0.6–1.3)

## 2025-03-31 ENCOUNTER — TELEPHONE (OUTPATIENT)
Dept: FAMILY MEDICINE CLINIC | Facility: CLINIC | Age: 70
End: 2025-03-31
Payer: MEDICARE

## 2025-03-31 ENCOUNTER — PATIENT OUTREACH (OUTPATIENT)
Dept: FAMILY MEDICINE CLINIC | Facility: CLINIC | Age: 70
End: 2025-03-31
Payer: MEDICARE

## 2025-04-08 ENCOUNTER — OFFICE VISIT (OUTPATIENT)
Dept: PULMONOLOGY | Facility: CLINIC | Age: 70
End: 2025-04-08
Payer: MEDICARE

## 2025-04-08 VITALS
OXYGEN SATURATION: 97 % | WEIGHT: 155.4 LBS | HEIGHT: 65 IN | SYSTOLIC BLOOD PRESSURE: 142 MMHG | HEART RATE: 85 BPM | TEMPERATURE: 97.5 F | BODY MASS INDEX: 25.89 KG/M2 | DIASTOLIC BLOOD PRESSURE: 90 MMHG

## 2025-04-08 DIAGNOSIS — F17.210 CIGARETTE NICOTINE DEPENDENCE WITHOUT COMPLICATION: ICD-10-CM

## 2025-04-08 DIAGNOSIS — Z12.2 ENCOUNTER FOR SCREENING FOR LUNG CANCER: ICD-10-CM

## 2025-04-08 DIAGNOSIS — J44.9 CHRONIC OBSTRUCTIVE PULMONARY DISEASE, UNSPECIFIED COPD TYPE: Primary | ICD-10-CM

## 2025-04-08 DIAGNOSIS — K21.00 GASTROESOPHAGEAL REFLUX DISEASE WITH ESOPHAGITIS WITHOUT HEMORRHAGE: ICD-10-CM

## 2025-04-08 RX ORDER — PREDNISONE 20 MG/1
40 TABLET ORAL DAILY
Qty: 10 TABLET | Refills: 0 | Status: SHIPPED | OUTPATIENT
Start: 2025-04-08

## 2025-04-08 NOTE — PROGRESS NOTES
"Chief Complaint  COPD    Subjective          Itzel Hagen presents to [facility] PULMONARY [facility]ARE MEDICINE for   History of Present Illness    Ms. Hagen is a 69 year old female with a medical history significant for hypertension, COPD, GERD, and hyperlipidemia.    She presents today for follow up on COPD. She reports that she has been doing well. She states that overall her breathing has been at baseline but that for the last few days she has had some increased wheezing.  She is currently taking anoro once daily, albuterol inhaler and neb treatments as needed.  She is a current smoker.    Objective   Vital Signs:   /90   Pulse 85   Temp 97.5 °F (36.4 °C)   Ht 165.1 cm (65\")   Wt 70.5 kg (155 lb 6.4 oz)   SpO2 97%   BMI 25.86 kg/m²         Physical Exam    GENERAL APPEARANCE: Well developed, well nourished, alert and cooperative, and appears to be in no acute distress.    HEAD: normocephalic. Atraumatic.    EYES: PERRL, EOMI. Vision is grossly intact.    THROAT: Oral cavity and pharynx normal. No inflammation, swelling, exudate, or lesions.     NECK: Neck supple.  No thyromegaly.    CARDIAC: Normal S1 and S2. No S3, S4 or murmurs. Rhythm is regular.     RESPIRATORY:Bilateral air entry positive. Bilateral wheezing.    GI: Positive bowel sounds. Soft, nondistended, nontender.     MUSCULOSKELETAL: No significant deformity or joint abnormality. No edema. Peripheral pulses intact. No varicosities.    NEUROLOGICAL: Strength and sensation symmetric and intact throughout.     PSYCHIATRIC: The mental examination revealed the patient was oriented to person, place, and time.       Estimated body mass index is 25.86 kg/m² as calculated from the following:    Height as of this encounter: 165.1 cm (65\").    Weight as of this encounter: 70.5 kg (155 lb 6.4 oz).        Result Review :   The following data was reviewed by: DEWAYNE Trevino on 04/08/2025:  Common labs         " " 10/7/2024    11:54 3/4/2025    14:48   Common Labs   Glucose 76     BUN 8     Creatinine 0.90  1.00    Sodium 138     Potassium 4.4     Chloride 101     Calcium 10.0     Albumin 4.0     Total Bilirubin 0.3     Alkaline Phosphatase 94     AST (SGOT) 21     ALT (SGPT) 13     WBC 7.26     Hemoglobin 15.7     Hematocrit 45.8     Platelets 260     Total Cholesterol 149     Triglycerides 93     HDL Cholesterol 55     LDL Cholesterol  77     Hemoglobin A1C 5.40          PFT:22  Severe obstructive lung disease with significant bronchodilator effect seen on this occasion.  Diffusion capacity is moderately reduced.  Lung volumes show mild restrictive lung disease.  Flow volume loop is obstructed and restricted.               Low dose lung cancer screenin24  FINDINGS: There is centrilobular emphysema. There are calcified  granulomas in the left lower lobe. There are stable subcentimeter  nodules in the right middle and lower lobes. There is no airspace  disease. The heart is normal in size. The aorta is normal in caliber.  There is no adenopathy within the chest. There are no pleural or  pericardial effusions. The visualized upper abdomen is unremarkable.  Bone windows reveal no acute osseous abnormalities.     IMPRESSION:  Lung RADS 2 benign findings     RECOMMENDATIONS:Annual low-dose chest CT.        This report was finalized on 2024 4:06 PM by Kandi Leiva M.D..        Previous chest imaging:NA     Alpha-1 antitrypsin screening:NA     STOP-Bang Score:   NA  [place] Sleepiness Scale:   NA        ABG:    pH No results found for: \"PHART\"   pO2 No results found for: \"PO2ART\"   pCO2 No results found for: \"XKO0OOC\"   HCO3 No results found for: \"RYP1WJP\"                      Assessment and Plan    Problem List Items Addressed This Visit          Tobacco    Cigarette nicotine dependence without complication    Relevant Orders    CT chest low dose wo       Other    Gastroesophageal reflux disease with " esophagitis without hemorrhage    Overview   Added automatically from request for surgery [id number]          Other Visit Diagnoses         Chronic obstructive pulmonary disease, unspecified COPD type    -  Primary    Relevant Medications    predniSONE (DELTASONE) 20 MG tablet      Encounter for screening for lung cancer        Relevant Orders    CT chest low dose valdo            Itzel Hagen  reports that she has been smoking cigarettes. She started smoking about 56 years ago. She has a 56.3 pack-year smoking history. She has been exposed to tobacco smoke. She has never used smokeless tobacco. I have educated her on the risk of diseases from using tobacco products such as cancer, COPD, and heart disease.     I advised her to quit and she is trying to quit but has not set a quit date.       Continue Anoro once daily.  Continue albuterol inhaler as needed.  Continue duonebs as needed.    Continue Protonix.    Low dose CT Chest was ordered to be done in September 2025.    Sent in prednisone for wheezing.    Follow Up   Return in about 6 months (around 10/8/2025).  Patient was given instructions and counseling regarding her condition or for health maintenance advice. Please see specific information pulled into the AVS if appropriate.

## 2025-04-09 ENCOUNTER — OFFICE VISIT (OUTPATIENT)
Dept: FAMILY MEDICINE CLINIC | Facility: CLINIC | Age: 70
End: 2025-04-09
Payer: MEDICARE

## 2025-04-09 VITALS
OXYGEN SATURATION: 99 % | TEMPERATURE: 97.8 F | DIASTOLIC BLOOD PRESSURE: 80 MMHG | SYSTOLIC BLOOD PRESSURE: 130 MMHG | WEIGHT: 155.2 LBS | BODY MASS INDEX: 25.86 KG/M2 | HEIGHT: 65 IN | HEART RATE: 93 BPM

## 2025-04-09 DIAGNOSIS — Z12.31 ENCOUNTER FOR SCREENING MAMMOGRAM FOR MALIGNANT NEOPLASM OF BREAST: Primary | ICD-10-CM

## 2025-04-09 DIAGNOSIS — I25.83 CORONARY ARTERY DISEASE DUE TO LIPID RICH PLAQUE: ICD-10-CM

## 2025-04-09 DIAGNOSIS — L60.0 INGROWN NAIL: ICD-10-CM

## 2025-04-09 DIAGNOSIS — I25.10 CORONARY ARTERY DISEASE DUE TO LIPID RICH PLAQUE: ICD-10-CM

## 2025-04-09 DIAGNOSIS — I10 ESSENTIAL HYPERTENSION: ICD-10-CM

## 2025-04-09 RX ORDER — ISOSORBIDE MONONITRATE 120 MG/1
120 TABLET, EXTENDED RELEASE ORAL EVERY MORNING
Qty: 90 TABLET | Refills: 0 | Status: SHIPPED | OUTPATIENT
Start: 2025-04-09

## 2025-04-09 RX ORDER — ATORVASTATIN CALCIUM 80 MG/1
80 TABLET, FILM COATED ORAL DAILY
Qty: 90 TABLET | Refills: 0 | Status: SHIPPED | OUTPATIENT
Start: 2025-04-09

## 2025-04-09 NOTE — PROGRESS NOTES
Chief Complaint  Hypertension    Subjective          Itzel Hagen presents to [facility]  Hypertension      History of Present Illness  The patient is a 69-year-old female who presents for a follow-up appointment.    She reports experiencing a pinched nerve, which she attributes to her sciatic nerve. The discomfort radiates down her leg, accompanied by tingling sensations in her foot. She has been under the care of a chiropractor since the previous Tuesday, with some improvement noted.    She has not had any breast cancer or anything in the family, so she is not particularly worried about it.    She also mentions an issue with her toenail, which appears loose and is causing discomfort. She has not sought podiatric care for this issue. She reports no coldness, numbness, or tingling in the affected foot but notes that her feet are generally cold. She retains good sensation in her feet, except for the aforementioned tingling.    She is currently on primidone, initially at a dose of 25 mg, which was increased to 50 mg due to lack of efficacy. An MRI was conducted, revealing some findings that have yet to be discussed with her neurologist. She expresses concern about potential Parkinson's disease due to observed tremors, but her neurologist does not believe this to be the case. Further investigations are ongoing to determine the cause of her symptoms.    She has transitioned from one [employer] program to another, resulting in complications with her medication refills. She plans to obtain her medications directly from [facility], opting for a 3-month supply at a time. She has a scheduled appointment with Emmanuel next Tuesday and had a consultation with Sharon yesterday. She is also under the care of a gastroenterologist and a neurologist, with an upcoming appointment tomorrow.    Supplemental Information  She recalls a previous dermatology consultation for skin spots, where she was  "advised against using certain medications due to potential interactions with her current regimen.    FAMILY HISTORY  She reports no family history of breast cancer.    MEDICATIONS  Current: primidone    IMMUNIZATIONS  She declined the pneumonia vaccine.    Review of Systems      Objective   Vital Signs:   /80 (BP Location: Right arm, Patient Position: Sitting, Cuff Size: Adult)   Pulse 93   Temp 97.8 °F (36.6 °C) (Temporal)   Ht 165.1 cm (65\")   Wt 70.4 kg (155 lb 3.2 oz)   SpO2 99%   BMI 25.83 kg/m²     Physical Exam  Lungs were auscultated.  Heart was examined.    Vital Signs  Blood pressure is normal.    Physical Exam  Constitutional:       General: She is not in acute distress.     Appearance: Normal appearance. She is well-developed and well-groomed. She is not ill-appearing, toxic-appearing or diaphoretic.   HENT:      Head: Normocephalic.      Nose: Nose normal. No congestion or rhinorrhea.      Mouth/Throat:      Mouth: Mucous membranes are moist.      Pharynx: Oropharynx is clear. No oropharyngeal exudate or posterior oropharyngeal erythema.   Eyes:      General: Lids are normal.         Right eye: No discharge.         Left eye: No discharge.      Extraocular Movements: Extraocular movements intact.      Pupils: Pupils are equal, round, and reactive to light.   Neck:      Vascular: No carotid bruit.   Cardiovascular:      Rate and Rhythm: Normal rate and regular rhythm.      Pulses: Normal pulses.      Heart sounds: Normal heart sounds. No murmur heard.     No friction rub. No gallop.   Pulmonary:      Effort: Pulmonary effort is normal. No respiratory distress.      Breath sounds: Normal breath sounds. No stridor. No wheezing, rhonchi or rales.   Chest:      Chest wall: No tenderness.   Abdominal:      General: Bowel sounds are normal. There is no distension.      Palpations: Abdomen is soft. There is no mass.      Tenderness: There is no abdominal tenderness. There is no right CVA tenderness, " left CVA tenderness, guarding or rebound.      Hernia: No hernia is present.   Musculoskeletal:         General: No swelling or tenderness. Normal range of motion.      Cervical back: Normal range of motion and neck supple. No rigidity or tenderness.      Right lower leg: No edema.      Left lower leg: No edema.   Lymphadenopathy:      Cervical: No cervical adenopathy.   Skin:     General: Skin is warm.      Capillary Refill: Capillary refill takes less than 2 seconds.      Coloration: Skin is not jaundiced.      Findings: No bruising, erythema or rash.   Neurological:      General: No focal deficit present.      Mental Status: She is alert and oriented to person, place, and time.      Motor: Motor function is intact. No weakness.      Coordination: Coordination is intact.      Gait: Gait is intact. Gait normal.   Psychiatric:         Attention and Perception: Attention normal.         Mood and Affect: Mood normal.         Speech: Speech normal.         Behavior: Behavior normal.         Cognition and Memory: Cognition normal.         Judgment: Judgment normal.        Result Review :          Results  Imaging  MRI shows a little bit of ischemic degenerative changes.              Assessment and Plan    Diagnoses and all orders for this visit:    1. Encounter for screening mammogram for malignant neoplasm of breast (Primary)  -     Mammo Screening Digital Tomosynthesis Bilateral With CAD; Future    2. Ingrown nail  -     Ambulatory Referral to Podiatry    3. Essential hypertension  -     isosorbide mononitrate (IMDUR) 120 MG 24 hr tablet; Take 1 tablet by mouth Every Morning.  Dispense: 90 tablet; Refill: 0    4. Coronary artery disease due to lipid rich plaque  -     atorvastatin (LIPITOR) 80 MG tablet; Take 1 tablet by mouth Daily.  Dispense: 90 tablet; Refill: 0      Assessment & Plan  1. Sciatica.  She reports experiencing a pinched nerve affecting her sciatic nerve, causing pain that radiates down her leg and  into her foot, accompanied by tingling sensations. She has been seeing a chiropractor since last Tuesday, which has provided some relief. She will continue with chiropractic care.    2. Medication Management.  She needs refills for all her medications. Prescriptions have been sent to [facility] for a 3-month supply.    3. Toenail disorder.  She reports a loose toenail on one foot, which she has not previously addressed with podiatry. A referral to podiatry has been made for further evaluation and management of her toenail condition.    4. Ischemic degenerative changes.  An MRI revealed ischemic degenerative changes. She was initially started on primidone 25 mg, which was increased to 50 mg due to insufficient response. She will follow up with her neurologist to discuss the MRI findings and continue exploring the cause of her symptoms.    5. Health Maintenance.  She declined the pneumonia vaccine. A mammogram has been scheduled for early detection and screening purposes.    Patient's Body mass index is 25.83 kg/m². indicating that she is overweight (BMI 25-29.9). Patient's (Body mass index is 25.83 kg/m².) indicates that they are overweight with health conditions that include hypertension and dyslipidemias . Weight is unchanged. BMI is above average; BMI management plan is completed. We discussed low calorie, low carb based diet program, portion control, and increasing exercise. .    Follow Up   Return in about 6 months (around 10/9/2025), or if symptoms worsen or fail to improve.  Patient was given instructions and counseling regarding her condition or for health maintenance advice. Please see specific information pulled into the AVS if appropriate.     This document has been electronically signed by DEWAYNE Dunbar  April 9, 2025 16:22 EDT     Patient or patient representative verbalized consent for the use of Ambient Listening during the visit with  DEWAYNE Dunbar for chart documentation.  4/9/2025  16:23 EDT

## 2025-04-10 ENCOUNTER — TELEPHONE (OUTPATIENT)
Dept: GASTROENTEROLOGY | Facility: CLINIC | Age: 70
End: 2025-04-10
Payer: MEDICARE

## 2025-04-10 ENCOUNTER — OFFICE VISIT (OUTPATIENT)
Dept: NEUROLOGY | Facility: CLINIC | Age: 70
End: 2025-04-10
Payer: MEDICARE

## 2025-04-10 VITALS
HEART RATE: 76 BPM | SYSTOLIC BLOOD PRESSURE: 120 MMHG | WEIGHT: 156 LBS | HEIGHT: 65 IN | TEMPERATURE: 98.1 F | BODY MASS INDEX: 25.99 KG/M2 | RESPIRATION RATE: 14 BRPM | OXYGEN SATURATION: 99 % | DIASTOLIC BLOOD PRESSURE: 68 MMHG

## 2025-04-10 DIAGNOSIS — K21.00 GASTROESOPHAGEAL REFLUX DISEASE WITH ESOPHAGITIS WITHOUT HEMORRHAGE: ICD-10-CM

## 2025-04-10 DIAGNOSIS — G25.0 ESSENTIAL TREMOR: Primary | ICD-10-CM

## 2025-04-10 DIAGNOSIS — M54.2 CHRONIC NECK PAIN: ICD-10-CM

## 2025-04-10 DIAGNOSIS — G89.29 CHRONIC NECK PAIN: ICD-10-CM

## 2025-04-10 RX ORDER — PRIMIDONE 50 MG/1
100 TABLET ORAL 2 TIMES DAILY
Qty: 120 TABLET | Refills: 3 | Status: SHIPPED | OUTPATIENT
Start: 2025-04-10 | End: 2025-04-10 | Stop reason: SDUPTHER

## 2025-04-10 RX ORDER — PANTOPRAZOLE SODIUM 40 MG/1
TABLET, DELAYED RELEASE ORAL
Qty: 90 TABLET | Refills: 3 | Status: SHIPPED | OUTPATIENT
Start: 2025-04-10

## 2025-04-10 RX ORDER — PRIMIDONE 50 MG/1
100 TABLET ORAL 2 TIMES DAILY
Qty: 120 TABLET | Refills: 3 | Status: SHIPPED | OUTPATIENT
Start: 2025-04-10

## 2025-04-10 NOTE — TELEPHONE ENCOUNTER
Patient walked in today and asked for a refill on her pantoprazole. She wants [facility] this time if possible.
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
## 2025-04-11 RX ORDER — ALBUTEROL SULFATE 90 UG/1
2 INHALANT RESPIRATORY (INHALATION) EVERY 4 HOURS PRN
Qty: 18 G | Refills: 3 | Status: SHIPPED | OUTPATIENT
Start: 2025-04-11

## 2025-04-11 RX ORDER — UMECLIDINIUM BROMIDE AND VILANTEROL TRIFENATATE 62.5; 25 UG/1; UG/1
1 POWDER RESPIRATORY (INHALATION)
Qty: 180 EACH | Refills: 3 | Status: SHIPPED | OUTPATIENT
Start: 2025-04-11

## 2025-04-11 RX ORDER — IPRATROPIUM BROMIDE AND ALBUTEROL SULFATE 2.5; .5 MG/3ML; MG/3ML
3 SOLUTION RESPIRATORY (INHALATION) 4 TIMES DAILY PRN
Qty: 1080 ML | Refills: 3 | Status: SHIPPED | OUTPATIENT
Start: 2025-04-11

## 2025-04-15 ENCOUNTER — HOSPITAL ENCOUNTER (OUTPATIENT)
Dept: MAMMOGRAPHY | Facility: HOSPITAL | Age: 70
Discharge: HOME | End: 2025-04-15
Admitting: FAMILY MEDICINE
Payer: MEDICARE

## 2025-04-15 ENCOUNTER — OFFICE VISIT (OUTPATIENT)
Dept: CARDIOLOGY | Facility: CLINIC | Age: 70
End: 2025-04-15
Payer: MEDICARE

## 2025-04-15 VITALS
SYSTOLIC BLOOD PRESSURE: 137 MMHG | HEIGHT: 65 IN | WEIGHT: 158.2 LBS | HEART RATE: 56 BPM | OXYGEN SATURATION: 96 % | DIASTOLIC BLOOD PRESSURE: 68 MMHG | BODY MASS INDEX: 26.36 KG/M2

## 2025-04-15 DIAGNOSIS — I25.83 CORONARY ARTERY DISEASE DUE TO LIPID RICH PLAQUE: Primary | ICD-10-CM

## 2025-04-15 DIAGNOSIS — I10 ESSENTIAL HYPERTENSION: ICD-10-CM

## 2025-04-15 DIAGNOSIS — I25.10 CORONARY ARTERY DISEASE DUE TO LIPID RICH PLAQUE: Primary | ICD-10-CM

## 2025-04-15 DIAGNOSIS — Z12.31 ENCOUNTER FOR SCREENING MAMMOGRAM FOR MALIGNANT NEOPLASM OF BREAST: ICD-10-CM

## 2025-04-15 PROCEDURE — 77063 BREAST TOMOSYNTHESIS BI: CPT

## 2025-04-15 PROCEDURE — 3075F SYST BP GE 130 - 139MM HG: CPT | Performed by: PHYSICIAN ASSISTANT

## 2025-04-15 PROCEDURE — 99214 OFFICE O/P EST MOD 30 MIN: CPT | Performed by: PHYSICIAN ASSISTANT

## 2025-04-15 PROCEDURE — 1160F RVW MEDS BY RX/DR IN RCRD: CPT | Performed by: PHYSICIAN ASSISTANT

## 2025-04-15 PROCEDURE — 77067 SCR MAMMO BI INCL CAD: CPT | Performed by: RADIOLOGY

## 2025-04-15 PROCEDURE — 1159F MED LIST DOCD IN RCRD: CPT | Performed by: PHYSICIAN ASSISTANT

## 2025-04-15 PROCEDURE — 77063 BREAST TOMOSYNTHESIS BI: CPT | Performed by: RADIOLOGY

## 2025-04-15 PROCEDURE — 77067 SCR MAMMO BI INCL CAD: CPT

## 2025-04-15 PROCEDURE — 3078F DIAST BP <80 MM HG: CPT | Performed by: PHYSICIAN ASSISTANT

## 2025-04-15 PROCEDURE — G2211 COMPLEX E/M VISIT ADD ON: HCPCS | Performed by: PHYSICIAN ASSISTANT

## 2025-04-15 NOTE — PROGRESS NOTES
Leonie Hdz APRN  Itzel Hagen  1955  04/15/2025    Patient Active Problem List   Diagnosis    Cigarette nicotine dependence without complication    Family history of premature coronary artery disease    Heartburn    Acute pancreatitis without infection or necrosis    Abnormal CT of the abdomen    Chronic fatigue syndrome    Gastro-esophageal reflux disease without esophagitis    Chronic pancreatitis    Esophagitis, erosive    Gastritis, erosive    Essential hypertension    Gastroesophageal reflux disease with esophagitis without hemorrhage    Encounter for screening for malignant neoplasm of colon    Coronary artery disease due to lipid rich plaque       Dear Leonie Hdz APRN:    Subjective     History of Present Illness:    Chief Complaint   Patient presents with    Follow-up     routine       Itzel Hagen is a pleasant 69 y.o. female with a past medical history significant for nonobstructive coronary artery disease with 20% stenosis in the RCA, tobacco abuse, family history of coronary artery disease with mother having CABG in a brother received stents in their 40s.  She does not have history of diabetes mellitus, however, does smoke tobacco. She comes in today for cardiology follow-up.      I will reports she has been stable with open questions.  Upon further questioning she does report some infrequent episodes of chest pains that she describes as sharp only occurring once or twice monthly.  She reports that these last for less than a minute and resolve spontaneously and is not able to reproduce with exertion.  She denies any worsening shortness of breath from baseline, palpitations, dizziness, or syncope.      Allergies   Allergen Reactions    Penicillins Anaphylaxis    Coconut Swelling and Rash     Tongue swells    Codeine Rash    Morphine Swelling   :      Current Outpatient Medications:     albuterol sulfate  (90 Base) MCG/ACT inhaler, Inhale 2 puffs Every 4 (Four) Hours As  Needed for Wheezing. Indications: 90 day supply, Disp: 18 g, Rfl: 3    ascorbic acid (VITAMIN C) 1000 MG tablet, Take 1 tablet by mouth Daily., Disp: , Rfl:     aspirin 81 MG EC tablet, Take 1 tablet by mouth Daily., Disp: 90 tablet, Rfl: 2    atorvastatin (LIPITOR) 80 MG tablet, Take 1 tablet by mouth Daily., Disp: 90 tablet, Rfl: 0    B Complex Vitamins (VITAMIN B COMPLEX PO), Take 2 capsules by mouth Daily., Disp: , Rfl:     cholecalciferol (VITAMIN D3) 25 MCG (1000 UT) tablet, Take 5 tablets by mouth Daily., Disp: , Rfl:     ipratropium-albuterol (DUO-NEB) 0.5-2.5 mg/3 ml nebulizer, Take 3 mL by nebulization 4 (Four) Times a Day As Needed for Wheezing., Disp: 1080 mL, Rfl: 3    isosorbide mononitrate (IMDUR) 120 MG 24 hr tablet, Take 1 tablet by mouth Every Morning., Disp: 90 tablet, Rfl: 0    metoprolol succinate XL (TOPROL-XL) 25 MG 24 hr tablet, Take 1 tablet by mouth Daily., Disp: 90 tablet, Rfl: 3    nitroglycerin (NITROSTAT) 0.4 MG SL tablet, Place 1 tablet under the tongue Every 5 (Five) Minutes As Needed for Chest Pain. Take no more than 3 doses in 15 minutes., Disp: 25 tablet, Rfl: 0    pantoprazole (PROTONIX) 40 MG EC tablet, 1 po daily in the am 30 minutes before breakfast, Disp: 90 tablet, Rfl: 3    predniSONE (DELTASONE) 20 MG tablet, Take 2 tablets by mouth Daily., Disp: 10 tablet, Rfl: 0    primidone (MYSOLINE) 50 MG tablet, Take 2 tablets by mouth 2 (Two) Times a Day., Disp: 120 tablet, Rfl: 3    Umeclidinium-Vilanterol (Anoro Ellipta) 62.5-25 MCG/ACT aerosol powder  inhaler, Inhale 1 puff Daily., Disp: 180 each, Rfl: 3    The following portions of the patient's history were reviewed and updated as appropriate: allergies, current medications, past family history, past medical history, past social history, past surgical history and problem list.    Social History     Tobacco Use    Smoking status: Every Day     Current packs/day: 1.00     Average packs/day: 1 pack/day for 56.3 years (56.3 ttl  "pk-yrs)     Types: Cigarettes     Start date: 1969     Passive exposure: Current    Smokeless tobacco: Never   Vaping Use    Vaping status: Never Used   Substance Use Topics    Alcohol use: No     Comment: \"not in 30 years\"    Drug use: No         Objective   Vitals:    04/15/25 1404   BP: 137/68   Pulse: 56   SpO2: 96%   Weight: 71.8 kg (158 lb 3.2 oz)   Height: 165.1 cm (65\")     Body mass index is 26.33 kg/m².    ROS    Constitutional:       General: Not in acute distress.     Appearance: Healthy appearance. Well-developed and not in distress. Not diaphoretic.   Eyes:      Conjunctiva/sclera: Conjunctivae normal.      Pupils: Pupils are equal, round, and reactive to light.   HENT:      Head: Normocephalic and atraumatic.   Neck:      Vascular: No carotid bruit or JVD.   Pulmonary:      Effort: Pulmonary effort is normal. No respiratory distress.      Breath sounds: Normal breath sounds.   Cardiovascular:      Normal rate. Regular rhythm.   Edema:     Peripheral edema absent.   Skin:     General: Skin is cool.   Neurological:      Mental Status: Alert, oriented to person, place, and time and oriented to person, place and time.         Lab Results   Component Value Date     10/07/2024    K 4.4 10/07/2024     10/07/2024    CO2 29.0 10/07/2024    BUN 8 10/07/2024    CREATININE 1.00 03/04/2025    GLUCOSE 76 10/07/2024    CALCIUM 10.0 10/07/2024    AST 21 10/07/2024    ALT 13 10/07/2024    ALKPHOS 94 10/07/2024     No results found for: \"CKTOTAL\"  Lab Results   Component Value Date    WBC 7.26 10/07/2024    HGB 15.7 10/07/2024    HCT 45.8 10/07/2024     10/07/2024     Lab Results   Component Value Date    INR 0.99 11/13/2021     Lab Results   Component Value Date    MG 1.5 (L) 11/13/2021     Lab Results   Component Value Date    TSH 3.620 10/07/2024    TRIG 93 10/07/2024    HDL 55 10/07/2024    LDL 77 10/07/2024      No results found for: \"BNP\"    During this visit the following were done:  Labs " Reviewed []    Labs Ordered []    Radiology Reports Reviewed []    Radiology Ordered []    PCP Records Reviewed []    Referring Provider Records Reviewed []    ER Records Reviewed []    Hospital Records Reviewed []    History Obtained From Family []    Radiology Images Reviewed []    Other Reviewed []    Records Requested []       Procedures    Assessment & Plan    Diagnosis Plan   1. Coronary artery disease due to lipid rich plaque        2. Essential hypertension                 Recommendations:  Precordial pain  I did offer trial of Ranexa however she declined stating symptoms are very infrequent and mild which I do agree with and think it is reasonable to continue monitoring.  However I explained to her if he did become more recurrent or longer lasting to contact our office  Continue aspirin, Lipitor, Imdur, metoprolol  If symptoms persist or worsen can consider CT coronary angiogram and I did explain this to her  Essential hypertension  Well-controlled    Return in about 6 months (around 10/15/2025).    As always, I appreciate very much the opportunity to participate in the cardiovascular care of your patients.      With Best Regards,    Sebastian Fuchs PA-C

## 2025-04-22 ENCOUNTER — OFFICE VISIT (OUTPATIENT)
Dept: FAMILY MEDICINE CLINIC | Facility: CLINIC | Age: 70
End: 2025-04-22
Payer: MEDICARE

## 2025-04-22 VITALS
HEIGHT: 65 IN | WEIGHT: 158 LBS | BODY MASS INDEX: 26.33 KG/M2 | DIASTOLIC BLOOD PRESSURE: 60 MMHG | OXYGEN SATURATION: 96 % | SYSTOLIC BLOOD PRESSURE: 132 MMHG | TEMPERATURE: 97.8 F | HEART RATE: 69 BPM

## 2025-04-22 DIAGNOSIS — L60.0 ONYCHOCRYPTOSIS: Primary | ICD-10-CM

## 2025-04-22 PROCEDURE — 1125F AMNT PAIN NOTED PAIN PRSNT: CPT | Performed by: FAMILY MEDICINE

## 2025-04-22 PROCEDURE — 99213 OFFICE O/P EST LOW 20 MIN: CPT | Performed by: FAMILY MEDICINE

## 2025-04-22 PROCEDURE — 3075F SYST BP GE 130 - 139MM HG: CPT | Performed by: FAMILY MEDICINE

## 2025-04-22 PROCEDURE — 3078F DIAST BP <80 MM HG: CPT | Performed by: FAMILY MEDICINE

## 2025-04-22 NOTE — PROGRESS NOTES
"Chief Complaint  Nail Problem (Toe nail on left big toe was ripped off )    Subjective          Itzel Hagen presents to [facility]  History of Present Illness  History of Present Illness  The patient is a 69-year-old female who presents for toenail pain.    She reports an incident on Saturday night where her toenail was partially torn off, resulting in bleeding. The bleeding has since stopped. The wound has been managed by cleaning it with soap and water, followed by the application of an antibiotic cream. She expresses a desire for professional evaluation to ensure proper wound care and cleanliness. An appointment with a podiatrist is scheduled for 05/07/2025. There is mild tenderness reported, but no significant pain.    Review of Systems      Objective   Vital Signs:   /60 (BP Location: Right arm, Patient Position: Sitting)   Pulse 69   Temp 97.8 °F (36.6 °C) (Temporal)   Ht 165.1 cm (65\")   Wt 71.7 kg (158 lb)   SpO2 96%   BMI 26.29 kg/m²     Physical Exam  Extremities: Mild tenderness in the toe    Physical Exam  Constitutional:       General: She is not in acute distress.     Appearance: Normal appearance. She is well-developed and well-groomed. She is not ill-appearing, toxic-appearing or diaphoretic.   HENT:      Head: Normocephalic.      Nose: Nose normal. No congestion or rhinorrhea.      Mouth/Throat:      Mouth: Mucous membranes are moist.      Pharynx: Oropharynx is clear. No oropharyngeal exudate or posterior oropharyngeal erythema.   Eyes:      General: Lids are normal.         Right eye: No discharge.         Left eye: No discharge.      Extraocular Movements: Extraocular movements intact.      Pupils: Pupils are equal, round, and reactive to light.   Neck:      Vascular: No carotid bruit.   Cardiovascular:      Rate and Rhythm: Normal rate and regular rhythm.      Pulses: Normal pulses.      Heart sounds: Normal heart sounds. No murmur heard.     " No friction rub. No gallop.   Pulmonary:      Effort: Pulmonary effort is normal. No respiratory distress.      Breath sounds: Normal breath sounds. No stridor. No wheezing, rhonchi or rales.   Chest:      Chest wall: No tenderness.   Abdominal:      General: Bowel sounds are normal. There is no distension.      Palpations: Abdomen is soft. There is no mass.      Tenderness: There is no abdominal tenderness. There is no right CVA tenderness, left CVA tenderness, guarding or rebound.      Hernia: No hernia is present.   Musculoskeletal:         General: No swelling or tenderness. Normal range of motion.      Cervical back: Normal range of motion and neck supple. No rigidity or tenderness.      Right lower leg: No edema.      Left lower leg: No edema.   Lymphadenopathy:      Cervical: No cervical adenopathy.   Skin:     General: Skin is warm.      Capillary Refill: Capillary refill takes less than 2 seconds.      Coloration: Skin is not jaundiced.      Findings: No bruising, erythema or rash.   Neurological:      General: No focal deficit present.      Mental Status: She is alert and oriented to person, place, and time.      Motor: Motor function is intact. No weakness.      Coordination: Coordination is intact.      Gait: Gait is intact. Gait normal.   Psychiatric:         Attention and Perception: Attention normal.         Mood and Affect: Mood normal.         Speech: Speech normal.         Behavior: Behavior normal.         Cognition and Memory: Cognition normal.         Judgment: Judgment normal.        Result Review :          Results                Assessment and Plan    There are no diagnoses linked to this encounter.  Assessment & Plan  1. Onychocryptosis.  - The condition appears to be stable at present.  - The affected area is not actively bleeding and shows minimal tenderness.  - Advised to continue with Epsom salt soaks and the application of antibiotic ointment.  - If there are any signs of drainage or an  unpleasant odor, immediate notification is required.    Patient's Body mass index is 26.29 kg/m². indicating that she is overweight (BMI 25-29.9). Patient's (Body mass index is 26.29 kg/m².) indicates that they are overweight with health conditions that include hypertension, dyslipidemias, and GERD . Weight is unchanged. BMI is above average; BMI management plan is completed. We discussed low calorie, low carb based diet program, portion control, and increasing exercise. .    Follow Up   Return if symptoms worsen or fail to improve.  Patient was given instructions and counseling regarding her condition or for health maintenance advice. Please see specific information pulled into the AVS if appropriate.     This document has been electronically signed by DEWAYNE Dunbar  April 22, 2025 14:56 EDT     Patient or patient representative verbalized consent for the use of Ambient Listening during the visit with  DEWAYNE Dunbar for chart documentation. 4/22/2025  14:56 EDT

## 2025-05-01 ENCOUNTER — HOSPITAL ENCOUNTER (OUTPATIENT)
Dept: MRI IMAGING | Facility: HOSPITAL | Age: 70
Discharge: HOME | End: 2025-05-01
Payer: MEDICARE

## 2025-05-01 ENCOUNTER — HOSPITAL ENCOUNTER (OUTPATIENT)
Dept: MAMMOGRAPHY | Facility: HOSPITAL | Age: 70
Discharge: HOME | End: 2025-05-01
Payer: MEDICARE

## 2025-05-01 DIAGNOSIS — G89.29 CHRONIC NECK PAIN: ICD-10-CM

## 2025-05-01 DIAGNOSIS — R92.8 ABNORMAL MAMMOGRAM OF RIGHT BREAST: ICD-10-CM

## 2025-05-01 DIAGNOSIS — M54.2 CHRONIC NECK PAIN: ICD-10-CM

## 2025-05-01 LAB — CREAT BLDA-MCNC: 0.8 MG/DL (ref 0.6–1.3)

## 2025-05-01 PROCEDURE — 82565 ASSAY OF CREATININE: CPT

## 2025-05-01 PROCEDURE — 72156 MRI NECK SPINE W/O & W/DYE: CPT

## 2025-05-01 PROCEDURE — 25510000001 GADOPICLENOL 0.5 MMOL/ML SOLUTION: Performed by: NURSE PRACTITIONER

## 2025-05-01 PROCEDURE — A9579 GAD-BASE MR CONTRAST NOS,1ML: HCPCS | Performed by: NURSE PRACTITIONER

## 2025-05-01 PROCEDURE — 72156 MRI NECK SPINE W/O & W/DYE: CPT | Performed by: RADIOLOGY

## 2025-05-01 PROCEDURE — 77065 DX MAMMO INCL CAD UNI: CPT

## 2025-05-01 RX ADMIN — GADOPICLENOL 6 ML: 485.1 INJECTION INTRAVENOUS at 13:57

## 2025-05-29 ENCOUNTER — HOSPITAL ENCOUNTER (OUTPATIENT)
Dept: MAMMOGRAPHY | Facility: HOSPITAL | Age: 70
Discharge: HOME | End: 2025-05-29
Payer: MEDICARE

## 2025-05-29 DIAGNOSIS — R92.8 ABNORMAL MAMMOGRAM OF RIGHT BREAST: ICD-10-CM

## 2025-05-29 PROCEDURE — A4648 IMPLANTABLE TISSUE MARKER: HCPCS

## 2025-05-30 LAB — REF LAB TEST METHOD: NORMAL

## 2025-06-03 ENCOUNTER — TELEPHONE (OUTPATIENT)
Dept: MAMMOGRAPHY | Facility: HOSPITAL | Age: 70
End: 2025-06-03
Payer: OTHER GOVERNMENT

## 2025-06-03 NOTE — TELEPHONE ENCOUNTER
Patient notified of breast biopsy results and recommendations. Encouraged patient to call with further needs. Patient to follow up with Dr. Cadet 6-6-25. NN notified.       ----- Message from Lori Masters sent at 6/3/2025 10:39 AM EDT -----  PATHOLOGY:    Ductal carcinoma in situ, intermediate nuclear grade, cribriform and comedo patterns with associated calcifications.    The pathology result is concordant with the imaging findings.    Recommend surgical consultation.    The patient will be notified of the results and recommendations by our breast care nurse.

## 2025-06-05 ENCOUNTER — PATIENT OUTREACH (OUTPATIENT)
Dept: ONCOLOGY | Facility: CLINIC | Age: 70
End: 2025-06-05
Payer: OTHER GOVERNMENT

## 2025-06-05 NOTE — SIGNIFICANT NOTE
The Nurse Navigator contacted the patient today and introduced the role of the Nurse Navigator. Spoke to patient about upcoming appointment with Dr. Cadet and what to expect. Time spent talking with patient. NN contact information was provided and encouraged patient to call with any questions or concerns. Pt. Verbalized understanding. NN will follow and assist PRN.

## 2025-06-06 ENCOUNTER — OFFICE VISIT (OUTPATIENT)
Dept: SURGERY | Facility: CLINIC | Age: 70
End: 2025-06-06
Payer: OTHER GOVERNMENT

## 2025-06-06 ENCOUNTER — OFFICE VISIT (OUTPATIENT)
Dept: FAMILY MEDICINE CLINIC | Facility: CLINIC | Age: 70
End: 2025-06-06
Payer: MEDICARE

## 2025-06-06 VITALS
BODY MASS INDEX: 26.42 KG/M2 | HEIGHT: 65 IN | RESPIRATION RATE: 20 BRPM | DIASTOLIC BLOOD PRESSURE: 84 MMHG | TEMPERATURE: 97.8 F | HEART RATE: 62 BPM | OXYGEN SATURATION: 97 % | SYSTOLIC BLOOD PRESSURE: 128 MMHG | WEIGHT: 158.6 LBS

## 2025-06-06 VITALS
SYSTOLIC BLOOD PRESSURE: 156 MMHG | HEART RATE: 70 BPM | BODY MASS INDEX: 26.33 KG/M2 | WEIGHT: 158 LBS | HEIGHT: 65 IN | DIASTOLIC BLOOD PRESSURE: 88 MMHG

## 2025-06-06 DIAGNOSIS — D05.11 BREAST NEOPLASM, TIS (DCIS), RIGHT: Primary | ICD-10-CM

## 2025-06-06 DIAGNOSIS — F43.0 ACUTE STRESS REACTION: Primary | ICD-10-CM

## 2025-06-06 DIAGNOSIS — Z71.89 OTHER SPECIFIED COUNSELING: ICD-10-CM

## 2025-06-06 DIAGNOSIS — D49.3 BREAST NEOPLASM: ICD-10-CM

## 2025-06-06 PROCEDURE — 99244 OFF/OP CNSLTJ NEW/EST MOD 40: CPT | Performed by: SURGERY

## 2025-06-06 PROCEDURE — 93702 BIS XTRACELL FLUID ANALYSIS: CPT | Performed by: SURGERY

## 2025-06-06 RX ORDER — CICLOPIROX 80 MG/ML
SOLUTION TOPICAL
COMMUNITY
Start: 2025-05-26

## 2025-06-06 NOTE — PROGRESS NOTES
Subjective   Itzel Hagen is a 69 y.o. female here today for pathology review.    History of Present Illness  Ms. Hagen was seen in the office today for her new diagnosis of DCIS of the right breast.  She underwent a screening mammogram on 4/15/2025 which demonstrated right breast calcifications.  Diagnostic mammogram was performed on 5/1/2025 and stereotactic biopsy was performed on 5/29/2025.  This demonstrated intermediate grade DCIS, ER positive, NV positive.  Marking clip was medial to the biopsy site.  Patient denies a palpable mass or nipple discharge.  No prior history of breast biopsy or cyst aspiration.  No family history of breast or ovarian cancer.  Patient is postmenopausal and not on hormone replacement therapy.  Allergies   Allergen Reactions    Penicillins Anaphylaxis    Coconut Swelling and Rash     Tongue swells    Codeine Rash    Morphine Swelling     Current Outpatient Medications   Medication Sig Dispense Refill    albuterol sulfate  (90 Base) MCG/ACT inhaler Inhale 2 puffs Every 4 (Four) Hours As Needed for Wheezing. Indications: 90 day supply 18 g 3    ascorbic acid (VITAMIN C) 1000 MG tablet Take 1 tablet by mouth Daily.      aspirin 81 MG EC tablet Take 1 tablet by mouth Daily. 90 tablet 2    atorvastatin (LIPITOR) 80 MG tablet Take 1 tablet by mouth Daily. 90 tablet 0    B Complex Vitamins (VITAMIN B COMPLEX PO) Take 2 capsules by mouth Daily.      cholecalciferol (VITAMIN D3) 25 MCG (1000 UT) tablet Take 5 tablets by mouth Daily.      isosorbide mononitrate (IMDUR) 120 MG 24 hr tablet Take 1 tablet by mouth Every Morning. 90 tablet 0    metoprolol succinate XL (TOPROL-XL) 25 MG 24 hr tablet Take 1 tablet by mouth Daily. 90 tablet 3    nitroglycerin (NITROSTAT) 0.4 MG SL tablet Place 1 tablet under the tongue Every 5 (Five) Minutes As Needed for Chest Pain. Take no more than 3 doses in 15 minutes. 25 tablet 0    pantoprazole (PROTONIX) 40 MG EC tablet 1 po daily in the am  30 minutes before breakfast 90 tablet 3    primidone (MYSOLINE) 50 MG tablet Take 2 tablets by mouth 2 (Two) Times a Day. 120 tablet 3    Umeclidinium-Vilanterol (Anoro Ellipta) 62.5-25 MCG/ACT aerosol powder  inhaler Inhale 1 puff Daily. 180 each 3    ipratropium-albuterol (DUO-NEB) 0.5-2.5 mg/3 ml nebulizer Take 3 mL by nebulization 4 (Four) Times a Day As Needed for Wheezing. 1080 mL 3    predniSONE (DELTASONE) 20 MG tablet Take 2 tablets by mouth Daily. (Patient not taking: Reported on 2025) 10 tablet 0     No current facility-administered medications for this visit.     Past Medical History:   Diagnosis Date    Acute pancreatitis     Angina, class III     Back pain     Bilateral cataracts     Chest pain     Chronic bronchitis     Cigarette nicotine dependence     COPD (chronic obstructive pulmonary disease)     Current smoker     Ear piercing     Essential hypertension 2021    Full dentures     GERD (gastroesophageal reflux disease)     H/O cardiac catheterization 2019    Dr. Izquierdo, Conclusion: 1.Overall mild coronary artery disease with right dominant system.    Headache     Heart palpitations     History of nuclear stress test 2019    ·Left ventricular ejection fraction is hyperdynamic (Calculated EF > 70%). Impressions are consistent with a low risk study.    HL (hearing loss)     Hyperlipidemia     PONV (postoperative nausea and vomiting)     Precordial chest pain     SOB (shortness of breath)      Past Surgical History:   Procedure Laterality Date    APPENDECTOMY      BACK SURGERY      Removed bone spurs/cleaned up the area    CARDIAC CATHETERIZATION N/A 2019    Procedure: Left Heart Cath;  Surgeon: Dre Izquierdo MD;  Location: [facility] CATH INVASIVE LOCATION;  Service: Cardiology    CATARACT EXTRACTION Bilateral 2022     SECTION  ,     CHOLECYSTECTOMY  2014    + stones    COLONOSCOPY N/A 2022    Procedure: COLONOSCOPY with biopsies ,  "polypectomy x3;  Surgeon: Antonio Macias MD;  Location: [facility] ENDOSCOPY;  Service: Gastroenterology;  Laterality: N/A;    ENDOSCOPY      ENDOSCOPY N/A 10/15/2019    Procedure: ESOPHAGOGASTRODUODENOSCOPY WITH BIOPSY AND ESOPHAGEAL DILATATION;  Surgeon: Ambrocio Solorzano MD;  Location: [facility] ENDOSCOPY;  Service: Gastroenterology    ENDOSCOPY N/A 03/02/2022    Procedure: ESOPHAGOGASTRODUODENOSCOPY with biopsy;  Surgeon: Antonio Macias MD;  Location: [facility] ENDOSCOPY;  Service: Gastroenterology;  Laterality: N/A;    HYSTERECTOMY  1990    SHOULDER ACROMIOPLASTY  1998    SUBTOTAL HYSTERECTOMY  1990    TOOTH EXTRACTION      TUBAL ABDOMINAL LIGATION  1982    UPPER GASTROINTESTINAL ENDOSCOPY  10/15/2019       Pertinent Review of Systems:  Respiratory: no shortness of breath  Cardiovascular: no chest pain  Other pertinent:      Objective   /88 (BP Location: Left arm)   Pulse 70   Ht 165.1 cm (65\")   Wt 71.7 kg (158 lb)   LMP  (LMP Unknown)   BMI 26.29 kg/m²   Physical Exam  Well-developed well-nourished female.  Smoker's cough  Neck:  No supraclavicular adenopathy  Lungs:  Respiratory effort normal. Auscultation: Clear, without wheezes, rhonchi, rales  Heart:  Regular rate and rhythm, without murmur, gallop, rub.  No pedal edema  Breasts: On visual inspection the breasts are symmetrical.  Examination of the right breast demonstrates no discrete mass, skin change, or axillary adenopathy.  Examination of the left breast demonstrates no discrete mass, skin change, or axillary adenopathy.       Procedures   L-Dex® Analysis for Lymphedema  The patient had a SOZO measurement which I reviewed today. The score is   4.1, see scanned to EMR. Bioimpedance spectroscopy helps identify the   onset of lymphedema in an arm or leg before patients experience noticeable swelling. Research has   shown that 92% of patients with early detection of lymphedema using L-Dex combined with   intervention do not progress to " chronic lymphedema through three years. Additionally, as of March 2023, the NCCN Guidelines® for Survivorship recommend proactive screening for lymphedema using   bioimpedance spectroscopy. Whenever possible, patients are tested for baseline L-Dex score before   cancer treatment begins and then are reassessed during regular follow-up visits using the SOZO device.   Otherwise, this can be started postoperatively and continued during regular follow-up visits. If the   patient's L-Dex score increases above normal levels, that is a sign that lymphedema is developing and a   referral is made to physical therapy for further evaluation and early compression treatment.   Lymphedema assessment with the SOZO L-Dex score is recommended to be done every 3 months for   the first 3 years and then every 6 months for years 4 and 5 followed by annually afterwards 80  Results/Data:  Imaging: Mammogram reports and images from 4/15/2025, 5/1/2025 and 5/29/2025 were reviewed.  I agree with the assessment.    Pathology result was reviewed and discussed with the patient and daughter    Assessment & Plan   DCIS of the right breast, ER positive, MO positive    Plan: I have recommended lumpectomy with wire localization and radiation.  Follow-up as needed         Discussion/Summary: Patient was emphatic that she would not consider any type of radiation whatsoever.  I attempted to explain to her what was involved with radiation but it was clear that she really was not interested in hearing the explanation.  I told her that the alternative would be mastectomy and she refused that.  I told her that lumpectomy without radiation was associated with a high rate of local recurrence.  Discussed the role of estrogen blockers she is hormone receptor positive.  Offered the patient an MRI if she so desired.  Patient was really closed off to really discussing any of the other options.  She is aware to call the office if she would like to discuss further  or potentially set up surgery    Time spent:     BMI is >= 25 and <30. (Overweight) The following options were offered after discussion;: nutrition counseling/recommendations       Future Appointments   Date Time Provider Department Center   7/18/2025  3:00 PM Lesvia Hui APRN [facility]   8/7/2025 10:30 AM Shea Mcneal PA-C [facility]   9/29/2025 10:15 AM COR [facility] CT 1 [address] [facility] CT [facility] [place]   10/8/2025  2:00 PM Sharon Rhodes APRN [facility]   10/8/2025  4:00 PM Leonie Hdz APRN [facility]   10/23/2025  3:30 PM Lori Linares APRN [facility]   11/4/2025  2:30 PM Sebastian Fuchs PA-C [facility]         This document has been electronically signed by       June 6, 2025 09:01 EDT      Please note that portions of this note were completed with a voice recognition program.

## 2025-06-06 NOTE — PROGRESS NOTES
"  [facility]     Chief Complaint  Office Visit (Recently diagnosed with breast cancer)    Itzel Hagen is a 69 y.o. female who presents today to [facility] for Office Visit (Recently diagnosed with breast cancer).    HPI:   HPI   History of Present Illness  The patient, a 69-year-old female, presents for evaluation of breast carcinoma. Reports \"I know my doctor isn't here but I just need someone to talk too.\"     She was diagnosed with ductal carcinoma in situ (DCIS) following her initial mammogram.. A subsequent biopsy confirmed the presence of stage 0 breast cancer. The proposed treatment plan includes surgical excision of the tumor, followed by adjuvant radiation therapy. However, the patient has expressed a preference to avoid radiation therapy, chemotherapy, and surgical intervention. She is currently deliberating whether to proceed with the recommended treatment or to forgo medical intervention. Her daughter is advocating for treatment, but the patient remains ambivalent. She has been informed that complete resection of the carcinoma may not be feasible even with surgical intervention. Furthermore, if the malignancy is found to be more extensive than initially anticipated, a mastectomy may be required.    The patient has expressed that the focus should be on her and her treatment decisions rather than family members input. She is experiencing significant emotional distress due to the thought of her , to whom she was  for 46 years, compounded by the cancer diagnosis. She reports feeling terrified and overwhelmed by her current circumstances and is seeking guidance on how to proceed.    Previous History:   Past Medical History:   Diagnosis Date    Acute pancreatitis     Angina, class III     Back pain     Bilateral cataracts     Chest pain     Chronic bronchitis     Cigarette nicotine dependence     COPD (chronic obstructive pulmonary disease)     " Current smoker     Ear piercing     Essential hypertension 2021    Full dentures     GERD (gastroesophageal reflux disease)     H/O cardiac catheterization 2019    Dr. Izquierdo, Conclusion: 1.Overall mild coronary artery disease with right dominant system.    Headache     Heart palpitations     History of nuclear stress test 2019    ·Left ventricular ejection fraction is hyperdynamic (Calculated EF > 70%). Impressions are consistent with a low risk study.    HL (hearing loss)     Hyperlipidemia     Myocardial infarction     PONV (postoperative nausea and vomiting)     Precordial chest pain     SOB (shortness of breath)       Past Surgical History:   Procedure Laterality Date    APPENDECTOMY      BACK SURGERY      Removed bone spurs/cleaned up the area    CARDIAC CATHETERIZATION N/A 2019    Procedure: Left Heart Cath;  Surgeon: Dre Izquierdo MD;  Location: [facility] CATH INVASIVE LOCATION;  Service: Cardiology    CATARACT EXTRACTION Bilateral 2022     SECTION  ,     CHOLECYSTECTOMY  2014    + stones    COLONOSCOPY N/A 2022    Procedure: COLONOSCOPY with biopsies , polypectomy x3;  Surgeon: Antonio Macias MD;  Location: [facility] ENDOSCOPY;  Service: Gastroenterology;  Laterality: N/A;    ENDOSCOPY      ENDOSCOPY N/A 10/15/2019    Procedure: ESOPHAGOGASTRODUODENOSCOPY WITH BIOPSY AND ESOPHAGEAL DILATATION;  Surgeon: Ambrocio Solorzano MD;  Location: [facility] ENDOSCOPY;  Service: Gastroenterology    ENDOSCOPY N/A 2022    Procedure: ESOPHAGOGASTRODUODENOSCOPY with biopsy;  Surgeon: Antonio Macias MD;  Location: [facility] ENDOSCOPY;  Service: Gastroenterology;  Laterality: N/A;    HYSTERECTOMY      SHOULDER ACROMIOPLASTY      SUBTOTAL HYSTERECTOMY      TOOTH EXTRACTION      TUBAL ABDOMINAL LIGATION  1982    UPPER GASTROINTESTINAL ENDOSCOPY  10/15/2019      Social History     Socioeconomic History    Marital status:    Tobacco Use     "Smoking status: Former     Current packs/day: 1.00     Average packs/day: 1 pack/day for 56.4 years (56.4 ttl pk-yrs)     Types: Cigarettes     Start date: 1969     Passive exposure: Current    Smokeless tobacco: Never   Vaping Use    Vaping status: Never Used   Substance and Sexual Activity    Alcohol use: No     Comment: \"not in 30 years\"    Drug use: No    Sexual activity: Not Currently     Partners: Male     Birth control/protection: Hysterectomy      Health Maintenance Due   Topic Date Due    DXA SCAN  Never done    TDAP/TD VACCINES (1 - Tdap) Never done    ZOSTER VACCINE (1 of 2) Never done    HEPATITIS C SCREENING  Never done    COVID-19 Vaccine (2 - 2024-25 season) 09/01/2024        Current Medications:  Current Outpatient Medications   Medication Sig Dispense Refill    albuterol sulfate  (90 Base) MCG/ACT inhaler Inhale 2 puffs Every 4 (Four) Hours As Needed for Wheezing. Indications: 90 day supply 18 g 3    ascorbic acid (VITAMIN C) 1000 MG tablet Take 1 tablet by mouth Daily.      aspirin 81 MG EC tablet Take 1 tablet by mouth Daily. 90 tablet 2    atorvastatin (LIPITOR) 80 MG tablet Take 1 tablet by mouth Daily. 90 tablet 0    B Complex Vitamins (VITAMIN B COMPLEX PO) Take 2 capsules by mouth Daily.      cholecalciferol (VITAMIN D3) 25 MCG (1000 UT) tablet Take 5 tablets by mouth Daily.      ciclopirox (PENLAC) 8 % solution APPLY TO THE AFFECTED AREA ONCE DAILY, ONCE WEEKLY FILE WITH [place] BOARD AND USE ALCOHOL TO REMOVE EXCESS ONCE WEEKLY      ipratropium-albuterol (DUO-NEB) 0.5-2.5 mg/3 ml nebulizer Take 3 mL by nebulization 4 (Four) Times a Day As Needed for Wheezing. 1080 mL 3    isosorbide mononitrate (IMDUR) 120 MG 24 hr tablet Take 1 tablet by mouth Every Morning. 90 tablet 0    metoprolol succinate XL (TOPROL-XL) 25 MG 24 hr tablet Take 1 tablet by mouth Daily. 90 tablet 3    nitroglycerin (NITROSTAT) 0.4 MG SL tablet Place 1 tablet under the tongue Every 5 (Five) Minutes As Needed for " "Chest Pain. Take no more than 3 doses in 15 minutes. 25 tablet 0    pantoprazole (PROTONIX) 40 MG EC tablet 1 po daily in the am 30 minutes before breakfast 90 tablet 3    predniSONE (DELTASONE) 20 MG tablet Take 2 tablets by mouth Daily. 10 tablet 0    primidone (MYSOLINE) 50 MG tablet Take 2 tablets by mouth 2 (Two) Times a Day. 120 tablet 3    Umeclidinium-Vilanterol (Anoro Ellipta) 62.5-25 MCG/ACT aerosol powder  inhaler Inhale 1 puff Daily. 180 each 3     No current facility-administered medications for this visit.       Allergies:   Allergies   Allergen Reactions    Penicillins Anaphylaxis    Coconut Swelling and Rash     Tongue swells    Codeine Rash    Morphine Swelling       Vitals:   /84 (BP Location: Right arm, Patient Position: Sitting, Cuff Size: Adult)   Pulse 62   Temp 97.8 °F (36.6 °C)   Resp 20   Ht 165.1 cm (65\")   Wt 71.9 kg (158 lb 9.6 oz)   LMP  (LMP Unknown)   SpO2 97%   BMI 26.39 kg/m²   Estimated body mass index is 26.39 kg/m² as calculated from the following:    Height as of this encounter: 165.1 cm (65\").    Weight as of this encounter: 71.9 kg (158 lb 9.6 oz).           Physical Exam:   Physical Exam  Vitals reviewed.   Constitutional:       Appearance: Normal appearance.   HENT:      Head: Normocephalic.   Eyes:      Extraocular Movements: Extraocular movements intact.      Conjunctiva/sclera: Conjunctivae normal.   Cardiovascular:      Rate and Rhythm: Normal rate.      Heart sounds: Normal heart sounds.   Pulmonary:      Effort: Pulmonary effort is normal.      Breath sounds: Normal breath sounds.   Abdominal:      General: Bowel sounds are normal.      Palpations: Abdomen is soft.   Skin:     General: Skin is warm and dry.   Neurological:      Mental Status: She is alert. Mental status is at baseline.      Comments: Normal gait    Psychiatric:         Mood and Affect: Mood is anxious. Affect is tearful.         Speech: Speech normal.         Behavior: Behavior is " agitated.         Thought Content: Thought content normal.         Cognition and Memory: Cognition and memory normal.         Judgment: Judgment normal.        Physical Exam         Lab Results:   Hospital Outpatient Visit on 2025   Component Date Value Ref Range Status    Reference Lab Report 2025    Final                    Value:[facility]  [address] [place]  [address]  Phone: [phone] or [phone]  Fax: [phone]  Lencho Cao M.D., Medical Director    PATIENT NAME                           LABORATORY NO.  DENVER PIMENTEL                     [id number]  [id number]                         AGE              SEX  SSN           CLIENT REF #  [facility] LUIS BREAST       69      1955  F    [id number]   [id number]    CENTER                             REQUESTING MDEBBY.     ATTENDING M.D.     COPY TO.  [address]                     [facility]                            DATE COLLECTED      DATE RECEIVED      DATE REPORTED  [facility] [id number]                   2025    DIAGNOSIS:  BREAST, BIOPSY, NEEDLE CORES, RIGHT:  Ductal carcinoma in situ, intermediate nuclear grade, cribriform and comedo  patterns with associated calcifications, see comment  Estrogen receptor: Positive, 100%,                           3+  Progesterone receptor: Positive, 90%, 3+    AVH    COMMENT:  E-cadherin is positive, confirming ductal origin.  Smooth muscle  myosin heavy chain and p63 show retained myoepithelial cells surrounding all  ducts, helping to exclude invasion.  All stains are performed with adequate  control.    BREAST BIOMARKER REPORTING TEMPLATE performed on block A1:  Estrogen Receptor (ER) Status: Positive, 100%, average intensity: 3+  Progesterone Receptor (PgR) Status: Positive, 90%, average intensity: 3+    The specimen meets requirements specified in the latest version of  "ASCO/CAP  guidelines. Estrogen receptor ([place] clone SP1, polymer, IVD), progesterone  receptor ([place] clone IE2, polymer, IVD), and HER-2/delma oncoprotein  ([place] clone 4B5, polymer, IVD) are performed on buffered formalin fixed,  paraffin embedded tissue, performed according to FDA approved protocol,  interpreted by the 2018 ASCO/CAP guidelines, and reported by the 2025 CAP  template. Variables that can affect the                           reliability of the assays include cold  ischemia time prior to fixation and total fixation time outside of ASCO/CAP  guidelines, processing with decalcification agents, and fixatives other than 10%  neutral buffered formalin.    CLINICAL HISTORY:  Right breast calcifications    SPECIMENS RECEIVED:  BREAST, BIOPSY, NEEDLE CORES , RIGHT    MICROSCOPIC DESCRIPTION:  Tissue blocks are prepared and slides are examined microscopically on all  specimens. See diagnosis for details.    The internal and external (both positive and negative) controls reacted  appropriately. Some of our immunohistochemical and in situ hybridization  studies are performed as analyte specific reagents. The following statement  applies to those tests: This test was developed, and its performance  characteristics determined by [facility]. It has not been  cleared or approved by the US Food and Drug Administration. However, the  FDA has determined that approval and clearance are not                           necessary.    Professional interpretation rendered by Danna Mendoza D.O. at [employer] [employer], [address] [place] [address], [place] [address].    GROSS DESCRIPTION:  Labeled \"right breast calcifications\", consisting of multiple tan-yellow breast  needle cores ranging in size from 0.3 x 0.2 x 0.1 cm to 3.3 x 0.4 x 0.3 cm.  Specimen is submitted entirely in cassettes A1-A4 with a total time in formalin  greater than 6 and less than 72 hours, cold ischemic time of 6 minutes.  SOLOLITA    REVIEWED, " DIAGNOSED AND ELECTRONICALLY  SIGNED BY:    Danna Mendoza D.O.  CPT CODES:  58848, 88360x2, 88341x2, 92661     Hospital Outpatient Visit on 05/01/2025   Component Date Value Ref Range Status    Creatinine 05/01/2025 0.80  0.60 - 1.30 mg/dL Final    Serial Number: [id number]:  [id number]     Results  Diagnostic Testing   - Breast biopsy: Confirmed DCIS (Ductal Carcinoma In Situ)    Results review: During today's encounter, all relevant clinical data has been reviewed.      Assessment and Plan  Diagnoses and all orders for this visit:    1. Acute stress reaction (Primary)    2. Breast neoplasm  -     Ambulatory Referral to Oncology    3. Other specified counseling      Assessment & Plan  Ductal Carcinoma In Situ (DCIS).  - She has been diagnosed with DCIS, which is a very early form of breast cancer. The condition is typically not life-threatening and is often treatable.  - It was emphasized that the cancer was detected before it had the opportunity to metastasize. The potential risks associated with untreated DCIS were discussed, including the possibility of it spreading and potentially progressing to invasive and life threatening.   -Emotional support provided. Risks and benefits of treatment options as laid out by surgeon, as well as non treatment options were discussed and possible effects of both.   - She was reassured that it is normal to feel upset and scared about the diagnosis. She was advised to take some time to process her emotions and consider her options before making any decisions regarding treatment, and this seemed to comfort her knowing that she didn't have to make a decision today.   -There are counseling services that can benefit patient an family if needed.   - A referral will be made to an oncologist for further discussion about her treatment options and prognosis.   -Patient reports feeling much better and advised her to contact our clinic or surgeon with any other questions she or family  may have. She is scheduled to follow up with PCP next week for check in.             New Medications:   No orders of the defined types were placed in this encounter.      Discontinued Medications:   There are no discontinued medications.           Visit Diagnoses:    ICD-10-CM ICD-9-CM   1. Acute stress reaction  F43.0 308.9   2. Breast neoplasm  D49.3 239.3   3. Other specified counseling  Z71.89 V65.49            Follow Up:   Return for with pcp for check in .    Patient was given instructions and counseling regarding her condition or for health maintenance advice. Please see specific information pulled into the AVS if appropriate.     Patient or patient representative verbalized consent for the use of Ambient Listening during the visit with  DEWAYNE Coleman for chart documentation. 6/6/2025  11:03 EDT      This document has been electronically signed by DEWAYNE Coleman   June 6, 2025 11:03 EDT    Dictated Utilizing Dragon Dictation: Part of this note may be an electronic transcription/translation of spoken language to printed text using the Dragon Dictation System.

## 2025-06-10 ENCOUNTER — PATIENT OUTREACH (OUTPATIENT)
Dept: ONCOLOGY | Facility: CLINIC | Age: 70
End: 2025-06-10
Payer: MEDICARE

## 2025-06-10 ENCOUNTER — TELEPHONE (OUTPATIENT)
Dept: ONCOLOGY | Facility: CLINIC | Age: 70
End: 2025-06-10

## 2025-06-10 NOTE — TELEPHONE ENCOUNTER
Caller: Itzel Hagen    Relationship: Self    Best call back number:   Telephone Information:   Mobile [phone]     Who are you requesting to speak with (clinical staff, provider,  specific staff member): SRAVANTHI     What was the call regarding: PATIENT WAS RETURNING A CALL FROM [facility] FROM 6/5/2025.

## 2025-06-10 NOTE — SIGNIFICANT NOTE
"NN received message that patient called to speak to me. I returned patient's call. Patient is a newly diagnosed DCIS. She seemed extremely upset after her consult with the surgeon Friday. She does not want surgery and wants to know her options. she states she does not want to see any surgeons here at [facility] she wants to talk to a medical oncologist and if she must see a surgeon she will go to [facility]. Patient was angry and screaming the entire conversation with me. Patient states \"all they want to do is put me under the knife\" I tried to explain the process to the patient and offered support, apologized for what was going on with her but she was not willing to listen. She demanded an appointment an oncologist and I informed her that the referral is in, and that office would call her with an appointment. I encouraged her to call with any other concerns. Patient voiced understanding.   "

## 2025-06-12 ENCOUNTER — OFFICE VISIT (OUTPATIENT)
Dept: FAMILY MEDICINE CLINIC | Facility: CLINIC | Age: 70
End: 2025-06-12
Payer: MEDICARE

## 2025-06-12 VITALS
WEIGHT: 158 LBS | DIASTOLIC BLOOD PRESSURE: 78 MMHG | BODY MASS INDEX: 26.33 KG/M2 | SYSTOLIC BLOOD PRESSURE: 130 MMHG | HEART RATE: 78 BPM | TEMPERATURE: 96.8 F | OXYGEN SATURATION: 96 % | HEIGHT: 65 IN

## 2025-06-12 DIAGNOSIS — D49.3 BREAST NEOPLASM: Primary | ICD-10-CM

## 2025-06-12 RX ORDER — BUSPIRONE HYDROCHLORIDE 5 MG/1
5 TABLET ORAL 3 TIMES DAILY
Qty: 90 TABLET | Refills: 2 | Status: SHIPPED | OUTPATIENT
Start: 2025-06-12

## 2025-06-12 NOTE — PROGRESS NOTES
"Chief Complaint  Breast Cancer    Subjective          [facility]  History of Present Illness  History of Present Illness  The patient is a 69-year-old female who presents for a follow-up appointment.    She has been diagnosed with stage 0 breast cancer, also known as precancerous stage. She has never undergone a mammogram until recently and has not taken any hormone therapy. She reports no nipple discharge or palpable lumps in her breasts. She has expressed a preference to consult with an oncologist prior to any surgical intervention. The biopsy results indicated estrogen receptor positive (3+) and progesterone positive.    She has successfully abstained from smoking for the past month, having quit on Mother's Day. She continues to experience a cough, which she attributes to residual lung congestion.    She is seeking medication to manage her anxiety.    SOCIAL HISTORY  The patient quit smoking on Mother's Day and has been off cigarettes for a month.    Review of Systems      Objective   Vital Signs:   /78 (BP Location: Right arm, Patient Position: Sitting, Cuff Size: Adult)   Pulse 78   Temp 96.8 °F (36 °C) (Temporal)   Ht 165.1 cm (65\")   Wt 71.7 kg (158 lb)   SpO2 96%   BMI 26.29 kg/m²     Physical Exam      Physical Exam  Constitutional:       General: She is not in acute distress.     Appearance: Normal appearance. She is well-developed and well-groomed. She is not ill-appearing, toxic-appearing or diaphoretic.   HENT:      Head: Normocephalic.      Nose: Nose normal. No congestion or rhinorrhea.      Mouth/Throat:      Mouth: Mucous membranes are moist.      Pharynx: Oropharynx is clear. No oropharyngeal exudate or posterior oropharyngeal erythema.   Eyes:      General: Lids are normal.         Right eye: No discharge.         Left eye: No discharge.      Extraocular Movements: Extraocular movements intact.      Pupils: Pupils are " equal, round, and reactive to light.   Neck:      Vascular: No carotid bruit.   Cardiovascular:      Rate and Rhythm: Normal rate and regular rhythm.      Pulses: Normal pulses.      Heart sounds: Normal heart sounds. No murmur heard.     No friction rub. No gallop.   Pulmonary:      Effort: Pulmonary effort is normal. No respiratory distress.      Breath sounds: No stridor. No wheezing, rhonchi or rales.   Chest:      Chest wall: No tenderness.   Abdominal:      General: Bowel sounds are normal. There is no distension.      Palpations: Abdomen is soft. There is no mass.      Tenderness: There is no abdominal tenderness. There is no right CVA tenderness, left CVA tenderness, guarding or rebound.      Hernia: No hernia is present.   Musculoskeletal:         General: No swelling or tenderness. Normal range of motion.      Cervical back: Normal range of motion and neck supple. No rigidity or tenderness.      Right lower leg: No edema.      Left lower leg: No edema.   Lymphadenopathy:      Cervical: No cervical adenopathy.   Skin:     General: Skin is warm.      Capillary Refill: Capillary refill takes less than 2 seconds.      Coloration: Skin is not jaundiced.      Findings: No bruising, erythema or rash.   Neurological:      General: No focal deficit present.      Mental Status: She is alert and oriented to person, place, and time.      Motor: Motor function is intact. No weakness.      Coordination: Coordination is intact.      Gait: Gait is intact. Gait normal.   Psychiatric:         Attention and Perception: Attention normal.         Mood and Affect: Mood normal.         Speech: Speech normal.         Behavior: Behavior normal.         Cognition and Memory: Cognition normal.         Judgment: Judgment normal.        Result Review :          Results  Diagnostic Testing   - Biopsy: Estrogen receptor was positive, 3+ and progesterone was positive.              Assessment and Plan    Diagnoses and all orders for this  visit:    1. Breast neoplasm (Primary)  -     Ambulatory Referral to Oncology    Other orders  -     busPIRone (BUSPAR) 5 MG tablet; Take 1 tablet by mouth 3 (Three) Times a Day.  Dispense: 90 tablet; Refill: 2      Assessment & Plan  1. Stage 0 breast cancer.  - Estrogen receptor positive at 3+, and progesterone receptor positive.  - Referral to oncology at [facility] initiated for further evaluation and management.  - Patient prefers to see an oncologist first to discuss options before considering surgery or other treatments.  - Hormone blocker therapy likely recommended due to hormone receptor positivity.    2. Anxiety.  - Prescription for BuSpar 5 mg, to be taken up to three times daily as needed, provided.  - Prescription sent to pharmacy.  - Further treatment options will be considered if BuSpar is not effective.    3. Smoking cessation.  - Patient quit smoking on Mother's Day and has been off cigarettes for a month.  - Encouraged to continue abstaining from smoking.  - Patient reports improved taste of food.    Follow-up  - Follow-up scheduled in 4 weeks to assess the effectiveness of the current medication regimen.    Patient's Body mass index is 26.29 kg/m². indicating that she is overweight (BMI 25-29.9). Patient's (Body mass index is 26.29 kg/m².) indicates that they are overweight with health conditions that include hypertension, dyslipidemias, and GERD . Weight is unchanged. BMI is above average; BMI management plan is completed. We discussed low calorie, low carb based diet program, portion control, and increasing exercise. .    Follow Up   Return in about 4 weeks (around 7/10/2025), or if symptoms worsen or fail to improve.  Patient was given instructions and counseling regarding her condition or for health maintenance advice. Please see specific information pulled into the AVS if appropriate.     This document has been electronically signed by DEWAYNE Dunbar  June 12, 2025 13:47 EDT      Patient or patient representative verbalized consent for the use of Ambient Listening during the visit with  DEWAYNE Dunbar for chart documentation. 6/12/2025  13:48 EDT

## 2025-06-23 DIAGNOSIS — G25.0 ESSENTIAL TREMOR: ICD-10-CM

## 2025-06-24 RX ORDER — PRIMIDONE 50 MG/1
100 TABLET ORAL 2 TIMES DAILY
Qty: 360 TABLET | Refills: 3 | Status: SHIPPED | OUTPATIENT
Start: 2025-06-24

## 2025-07-01 ENCOUNTER — CONSULT (OUTPATIENT)
Dept: ONCOLOGY | Facility: CLINIC | Age: 70
End: 2025-07-01
Payer: MEDICARE

## 2025-07-01 VITALS
WEIGHT: 158 LBS | HEART RATE: 57 BPM | OXYGEN SATURATION: 97 % | DIASTOLIC BLOOD PRESSURE: 67 MMHG | SYSTOLIC BLOOD PRESSURE: 160 MMHG | TEMPERATURE: 97.1 F | HEIGHT: 65 IN | BODY MASS INDEX: 26.33 KG/M2

## 2025-07-01 DIAGNOSIS — Z78.0 POSTMENOPAUSAL: ICD-10-CM

## 2025-07-01 DIAGNOSIS — D05.11 DUCTAL CARCINOMA IN SITU (DCIS) OF RIGHT BREAST: Primary | ICD-10-CM

## 2025-07-01 PROCEDURE — 1126F AMNT PAIN NOTED NONE PRSNT: CPT | Performed by: INTERNAL MEDICINE

## 2025-07-01 PROCEDURE — 3078F DIAST BP <80 MM HG: CPT | Performed by: INTERNAL MEDICINE

## 2025-07-01 PROCEDURE — 99204 OFFICE O/P NEW MOD 45 MIN: CPT | Performed by: INTERNAL MEDICINE

## 2025-07-01 PROCEDURE — 3077F SYST BP >= 140 MM HG: CPT | Performed by: INTERNAL MEDICINE

## 2025-07-01 NOTE — LETTER
2025     DEWAYNE Dunbar  [address] [place] [address]    Patient: Itzel Hagen   YOB: 1955   Date of Visit: 2025     Dear DEWAYNE Dunbar:       Thank you for referring Itzel Hagen to me for evaluation. Below are the relevant portions of my assessment and plan of care.    If you have questions, please do not hesitate to call me. I look forward to following Itzel along with you.         Sincerely,        Brandi Adamson MD        CC: MD Juan Diego Fish Amie E, MD  [phone]  Sign when Signing Visit    [facility]  Office number [phone]    Fax number [phone]     New Patient Office Visit      Date: 2025     Patient Name: Itzel Hagen  MRN: [id number]  : 1955    Referred by: DEWAYNE Dunbar    Chief Complaint: Right breast DCIS    Cancer [place]    History of Present Illness: Itzel Hagen is a pleasant 69 y.o. female who presents today for evaluation of DCIS. The patient is accompanied by her supportive daughter.     She went for a baseline screening mammogram 4/15/2025 which showed calcifications in the right upper outer quadrant for which diagnostic workup was recommended. She reports no preceding breast symptoms.     Diagnostic mammogram on 2025 showed amorphous calcifications in the right 11:00 breast.  Right breast biopsy 2025 showed intermediate grade DCIS (%, MI 90%).    Recent brain MRI 3/20/2025 performed for tremor and family history of Parkinson's with age related findings.  He has a history of lung nodules for which she is undergoing annual low-dose CT screening, most recently in 2024.    Hiatal hernia/chronic chest pain, follows with cardiology PA. Negative LHC , no confirmed MI  No prior bone density testing.     Breast cancer risk profile:  ; Age of first live birth 23  Age of menopause: Hysterectomy 1990s uterus intact.   No  HRT  Family history of breast, ovarian, prostate or pancreatic cancer: none.     She met with a local surgeon.  She was offered lumpectomy.  There was discussion about the possible need for mastectomy.  The patient is unsure about proceeding with surgical treatment for DCIS and requested a referral to medical oncology to review her case and to discuss treatment options.    Review of Systems:   I have reviewed the review of systems completed by the patient. This is negative for clinically significant symptoms except as noted below. This document has been scanned into the patient's chart.  Cough, chronic shortness of breath, heartburn chronic, chronic joint and back pain, chronic easy bruising, new numbness or tingling (tremor)    Past Medical History:   Past Medical History:   Diagnosis Date   • Acute pancreatitis    • Angina, class III    • Back pain    • Bilateral cataracts    • Breast cancer    • Chest pain    • Chronic bronchitis    • Cigarette nicotine dependence    • COPD (chronic obstructive pulmonary disease)    • Current smoker    • Ear piercing    • Essential hypertension 08/03/2021   • Full dentures    • GERD (gastroesophageal reflux disease)    • H/O cardiac catheterization 09/06/2019    Dr. Izquierdo, Conclusion: 1.Overall mild coronary artery disease with right dominant system.   • Headache    • Heart palpitations    • History of nuclear stress test 08/28/2019    ·Left ventricular ejection fraction is hyperdynamic (Calculated EF > 70%). Impressions are consistent with a low risk study.   • HL (hearing loss)    • Hyperlipidemia    • Myocardial infarction    • PONV (postoperative nausea and vomiting)    • Precordial chest pain    • SOB (shortness of breath)        Past Surgical History:   Past Surgical History:   Procedure Laterality Date   • APPENDECTOMY  1971   • BACK SURGERY  2007    Removed bone spurs/cleaned up the area   • CARDIAC CATHETERIZATION N/A 09/06/2019    Procedure: Left Heart Cath;  Surgeon:  Dre Izquierdo MD;  [facility]  Service: Cardiology   • CATARACT EXTRACTION Bilateral 2022   •  SECTION  ,    • CHOLECYSTECTOMY      + stones   • COLONOSCOPY N/A 2022    Procedure: COLONOSCOPY with biopsies , polypectomy x3;  Surgeon: Antonio Macias MD;  Location: [facility] ENDOSCOPY;  Service: Gastroenterology;  Laterality: N/A;   • ENDOSCOPY     • ENDOSCOPY N/A 10/15/2019    Procedure: ESOPHAGOGASTRODUODENOSCOPY WITH BIOPSY AND ESOPHAGEAL DILATATION;  Surgeon: Ambrocio Solorzano MD;  Location: [facility] ENDOSCOPY;  Service: Gastroenterology   • ENDOSCOPY N/A 2022    Procedure: ESOPHAGOGASTRODUODENOSCOPY with biopsy;  Surgeon: Antonio Macias MD;  Location: [facility] ENDOSCOPY;  Service: Gastroenterology;  Laterality: N/A;   • HYSTERECTOMY     • SHOULDER ACROMIOPLASTY     • SUBTOTAL HYSTERECTOMY     • TOOTH EXTRACTION     • TUBAL ABDOMINAL LIGATION     • UPPER GASTROINTESTINAL ENDOSCOPY  10/15/2019       Family History:   Family History   Problem Relation Age of Onset   • Heart disease Mother    • Diabetes Mother    • Dementia Mother    • Stroke Mother    • Vision loss Mother    • Heart disease Father    • Parkinsonism Father    • Dementia Father    • Diabetes Father    • Heart disease Brother    • Diabetes Maternal Uncle    • Cancer Maternal Grandfather    • Diabetes Maternal Grandfather    • Hyperlipidemia Maternal Grandmother    • Cirrhosis Neg Hx    • Liver cancer Neg Hx    • Liver disease Neg Hx    • Colon cancer Neg Hx    • Crohn's disease Neg Hx    • Ulcerative colitis Neg Hx    • Esophageal cancer Neg Hx    • Stomach cancer Neg Hx    Mother non-melanoma skin cancer,  of stroke  MGF lung cancer, smoking associated    Social History:   Social History     Socioeconomic History   • Marital status:    Tobacco Use   • Smoking status: Former     Current packs/day: 0.00     Average packs/day: 1 pack/day for 56.4 years  "(56.4 ttl pk-yrs)     Types: Cigarettes     Start date:      Quit date: 2025     Years since quittin.1     Passive exposure: Current   • Smokeless tobacco: Never   Vaping Use   • Vaping status: Never Used   Substance and Sexual Activity   • Alcohol use: No     Comment: \"not in 30 years\"   • Drug use: No   • Sexual activity: Not Currently     Partners: Male     Birth control/protection: Hysterectomy   Quit smoking Mothers Day     Medications:     Current Outpatient Medications:   •  albuterol sulfate  (90 Base) MCG/ACT inhaler, Inhale 2 puffs Every 4 (Four) Hours As Needed for Wheezing. Indications: 90 day supply, Disp: 18 g, Rfl: 3  •  ascorbic acid (VITAMIN C) 1000 MG tablet, Take 1 tablet by mouth Daily., Disp: , Rfl:   •  aspirin 81 MG EC tablet, Take 1 tablet by mouth Daily., Disp: 90 tablet, Rfl: 2  •  atorvastatin (LIPITOR) 80 MG tablet, Take 1 tablet by mouth Daily., Disp: 90 tablet, Rfl: 0  •  B Complex Vitamins (VITAMIN B COMPLEX PO), Take 2 capsules by mouth Daily., Disp: , Rfl:   •  busPIRone (BUSPAR) 5 MG tablet, Take 1 tablet by mouth 3 (Three) Times a Day., Disp: 90 tablet, Rfl: 2  •  cholecalciferol (VITAMIN D3) 25 MCG (1000 UT) tablet, Take 5 tablets by mouth Daily., Disp: , Rfl:   •  ciclopirox (PENLAC) 8 % solution, APPLY TO THE AFFECTED AREA ONCE DAILY, ONCE WEEKLY FILE WITH [place] BOARD AND USE ALCOHOL TO REMOVE EXCESS ONCE WEEKLY, Disp: , Rfl:   •  ipratropium-albuterol (DUO-NEB) 0.5-2.5 mg/3 ml nebulizer, Take 3 mL by nebulization 4 (Four) Times a Day As Needed for Wheezing., Disp: 1080 mL, Rfl: 3  •  isosorbide mononitrate (IMDUR) 120 MG 24 hr tablet, Take 1 tablet by mouth Every Morning., Disp: 90 tablet, Rfl: 0  •  metoprolol succinate XL (TOPROL-XL) 25 MG 24 hr tablet, Take 1 tablet by mouth Daily., Disp: 90 tablet, Rfl: 3  •  nitroglycerin (NITROSTAT) 0.4 MG SL tablet, Place 1 tablet under the tongue Every 5 (Five) Minutes As Needed for Chest Pain. Take no more than 3 " "doses in 15 minutes., Disp: 25 tablet, Rfl: 0  •  pantoprazole (PROTONIX) 40 MG EC tablet, 1 po daily in the am 30 minutes before breakfast, Disp: 90 tablet, Rfl: 3  •  predniSONE (DELTASONE) 20 MG tablet, Take 2 tablets by mouth Daily., Disp: 10 tablet, Rfl: 0  •  primidone (MYSOLINE) 50 MG tablet, TAKE 2 TABLETS TWICE A DAY, Disp: 360 tablet, Rfl: 3  •  Umeclidinium-Vilanterol (Anoro Ellipta) 62.5-25 MCG/ACT aerosol powder  inhaler, Inhale 1 puff Daily., Disp: 180 each, Rfl: 3    Allergies:   Allergies   Allergen Reactions   • Penicillins Anaphylaxis   • Coconut Swelling and Rash     Tongue swells   • Codeine Rash   • Morphine Swelling       Objective     Vital Signs:   Vitals:    07/01/25 1040   BP: 160/67   Pulse: 57   Temp: 97.1 °F (36.2 °C)   TempSrc: Infrared   SpO2: 97%   Weight: 71.7 kg (158 lb)   Height: 165.1 cm (65\")   PainSc: 0-No pain    Body mass index is 26.29 kg/m².   Pain Score    07/01/25 1040   PainSc: 0-No pain       ECOG Performance Status: 0 - Asymptomatic    Physical Exam:   General: No acute distress. Well appearing   HEENT: Normocephalic, atraumatic. Sclera anicteric.   Neck: supple, no adenopathy.   Cardiovascular: regular rate and rhythm. No murmurs.   Respiratory: Normal rate. Clear to auscultation bilaterally  Abdomen: Soft, nontender, non distended with normoactive bowel sounds  Lymph: no cervical, supraclavicular or axillary adenopathy  Neuro: Alert and oriented x 3. No focal deficits.   Ext: Symmetric, no swelling.   Breast: No palpable masses in either breast or axilla.    Laboratory/Imaging Reviewed:   No visits with results within 2 Week(s) from this visit.   Latest known visit with results is:   Hospital Outpatient Visit on 05/29/2025   Component Date Value Ref Range Status   • Reference Lab Report 05/29/2025    Final                    Value:[facility]  [address] [place] [address]  Phone: [phone] or [phone]  Fax: " [phone]  Lencho Cao M.D., Medical Director    PATIENT NAME                           LABORATORY NO.  [id number]  [address] PRASAD                     [id number]  [id number]                         AGE              SEX  N           CLIENT REF #  [facility] BREAST       69      1955  F    [id number]   [id number]    CENTER                             REQUESTING LOUD.     ATTENDING M.D.     COPY TO.  [address]                     MASTERS, JULIETH OTERO [address]                            DATE COLLECTED      DATE RECEIVED      DATE REPORTED  [facility] [address]                   2025    DIAGNOSIS:  BREAST, BIOPSY, NEEDLE CORES, RIGHT:  Ductal carcinoma in situ, intermediate nuclear grade, cribriform and comedo  patterns with associated calcifications, see comment  Estrogen receptor: Positive, 100%,                           3+  Progesterone receptor: Positive, 90%, 3+    AVH    COMMENT:  E-cadherin is positive, confirming ductal origin.  Smooth muscle  myosin heavy chain and p63 show retained myoepithelial cells surrounding all  ducts, helping to exclude invasion.  All stains are performed with adequate  control.    BREAST BIOMARKER REPORTING TEMPLATE performed on block A1:  Estrogen Receptor (ER) Status: Positive, 100%, average intensity: 3+  Progesterone Receptor (PgR) Status: Positive, 90%, average intensity: 3+    The specimen meets requirements specified in the latest version of ASCO/CAP  guidelines. Estrogen receptor ([place] clone SP1, polymer, IVD), progesterone  receptor ([place] clone IE2, polymer, IVD), and HER-2/delma oncoprotein  ([place] clone 4B5, polymer, IVD) are performed on buffered formalin fixed,  paraffin embedded tissue, performed according to FDA approved protocol,  interpreted by the 2018 ASCO/CAP guidelines, and reported by the  CAP  template. Variables that can affect the                           reliability of the  "assays include cold  ischemia time prior to fixation and total fixation time outside of ASCO/CAP  guidelines, processing with decalcification agents, and fixatives other than 10%  neutral buffered formalin.    CLINICAL HISTORY:  Right breast calcifications    SPECIMENS RECEIVED:  BREAST, BIOPSY, NEEDLE CORES , RIGHT    MICROSCOPIC DESCRIPTION:  Tissue blocks are prepared and slides are examined microscopically on all  specimens. See diagnosis for details.    The internal and external (both positive and negative) controls reacted  appropriately. Some of our immunohistochemical and in situ hybridization  studies are performed as analyte specific reagents. The following statement  applies to those tests: This test was developed, and its performance  characteristics determined by [facility]. It has not been  cleared or approved by the US Food and Drug Administration. However, the  FDA has determined that approval and clearance are not                           necessary.    Professional interpretation rendered by Danna Mendoza D.O. at [facility], [address] [place] [address].    GROSS DESCRIPTION:  Labeled \"right breast calcifications\", consisting of multiple tan-yellow breast  needle cores ranging in size from 0.3 x 0.2 x 0.1 cm to 3.3 x 0.4 x 0.3 cm.  Specimen is submitted entirely in cassettes A1-A4 with a total time in formalin  greater than 6 and less than 72 hours, cold ischemic time of 6 minutes.  SOG    REVIEWED, DIAGNOSED AND ELECTRONICALLY  SIGNED BY:    Danna Mendoza D.O.  CPT CODES:  31083, 88360x2, 88341x2, 56424         No results found.    Procedures    Assessment / Plan      Assessment/Plan:   Right breast DCIS (ER/TN positive)  I reviewed the patient's history, imaging and pathology as outlined in the HPI.   We reviewed that DCIS represents a non-invasive or \"precancerous\" condition.  We discussed standard of care treatment would be surgery to remove the " existing DCIS and exclude an invasive component.  We reviewed that surgery is the only curative treatment for DCIS.  We discussed options after surgery to lower the risk for recurrence or development of a new primary include adjuvant radiation and endocrine prophylaxis.  However, some women may elect against additional treatment after curative intent surgery and elect instead to proceed with close mammography surveillance.      We discussed an alternative option for patients who are not candidates for surgery due to medical conditions, or who elect against surgical intervention, is primary endocrine therapy.  We discussed that the goal of endocrine therapy would be to control the DCIS, and that it is not curative.  We discussed that at some point it is likely that the DCIS would progress despite endocrine therapy, and then surgical resection should be reconsidered.  We did discuss schedule and side effects of anastrozole in some detail. Potential side effects include but not limited to fracture, bone loss, arthralgias, risk for accelerated cardiovascular disease/stroke, and vasomotor symptoms.   At the end of our conversation, the patient elected to consider her options further and was amenable to a second opinion surgical consultation.    If she elects for surgical resection, I will see her back afterwards.  If she declined surgical resection, she will contact me if she elects for anastrozole trial.     Patient Instructions   -Call after your consult with Dr. SUAREZ with an update on your decision. If you decide against surgery and would like to try the AI, I will call it in for you. If you decide to proceed with surgery, we should schedule a follow up 2 weeks after to review pathology and recommendations. If you have more questions or are undecided we can schedule a follow up.     2. Bone health  -Aromatase inhibitor use is associated with risk for accelerated age related bone loss.  -She will need baseline DEXA at  the time of endocrine therapy initiation.    -Plan to repeat DEXA every 2-years while on endocrine therapy.    -I encourage adequate calcium intake targeting 500 -1000 mg daily through diet or supplement, vitamin D supplementation of at least 1000 IU daily. Weightbearing exercise as tolerated can also help maintain bone density.    3. Genetics  -She requests referral for GC    Follow Up:   Pending her decision     Brandi Adamson MD  [facility]

## 2025-07-01 NOTE — PATIENT INSTRUCTIONS
-Call after your consult with Dr. SUAREZ with an update on your decision. If you decide against surgery and would like to try the AI, I will call it in for you. If you decide to proceed with surgery, we should schedule a follow up 2 weeks after to review pathology and recommendations. If you have more questions or are undecided we can schedule a follow up.

## 2025-07-01 NOTE — PROGRESS NOTES
[facility]ology [facility] [place]  Office number [phone]    Fax number [phone]     New Patient Office Visit      Date: 2025     Patient Name: Itzel Hagen  MRN: [id number]  : 1955    Referred by: DEWAYNE Dunbar    Chief Complaint: Right breast DCIS    Cancer [place]    History of Present Illness: Itzel Hagen is a pleasant 69 y.o. female who presents today for evaluation of DCIS. The patient is accompanied by her supportive daughter.     She went for a baseline screening mammogram 4/15/2025 which showed calcifications in the right upper outer quadrant for which diagnostic workup was recommended. She reports no preceding breast symptoms.     Diagnostic mammogram on 2025 showed amorphous calcifications in the right 11:00 breast.  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    Recent brain MRI 3/20/2025 performed for tremor and family history of Parkinson's with age related findings.  He has a history of lung nodules for which she is undergoing annual low-dose CT screening, most recently in 2024.    Hiatal hernia/chronic chest pain, follows with cardiology PA. Negative LHC , no confirmed MI  No prior bone density testing.     Breast cancer risk profile:  ; Age of first live birth 23  Age of menopause: Hysterectomy 1990s uterus intact.   No HRT  Family history of breast, ovarian, prostate or pancreatic cancer: none.     She met with a local surgeon.  She was offered lumpectomy.  There was discussion about the possible need for mastectomy.  The patient is unsure about proceeding with surgical treatment for DCIS and requested a referral to medical oncology to review her case and to discuss treatment options.    Review of Systems:   I have reviewed the review of systems completed by the patient. This is negative for clinically significant symptoms except as noted below. This document has been scanned into the patient's  chart.  Cough, chronic shortness of breath, heartburn chronic, chronic joint and back pain, chronic easy bruising, new numbness or tingling (tremor)    Past Medical History:   Past Medical History:   Diagnosis Date    Acute pancreatitis     Angina, class III     Back pain     Bilateral cataracts     Breast cancer     Chest pain     Chronic bronchitis     Cigarette nicotine dependence     COPD (chronic obstructive pulmonary disease)     Current smoker     Ear piercing     Essential hypertension 2021    Full dentures     GERD (gastroesophageal reflux disease)     H/O cardiac catheterization 2019    Dr. Izquierdo, Conclusion: 1.Overall mild coronary artery disease with right dominant system.    Headache     Heart palpitations     History of nuclear stress test 2019    ·Left ventricular ejection fraction is hyperdynamic (Calculated EF > 70%). Impressions are consistent with a low risk study.    HL (hearing loss)     Hyperlipidemia     Myocardial infarction     PONV (postoperative nausea and vomiting)     Precordial chest pain     SOB (shortness of breath)        Past Surgical History:   Past Surgical History:   Procedure Laterality Date    APPENDECTOMY  1971    BACK SURGERY      Removed bone spurs/cleaned up the area    CARDIAC CATHETERIZATION N/A 2019    Procedure: Left Heart Cath;  Surgeon: Dre Izquierdo MD;  [facility]: [facility] C[facility];  Service: Cardiology    CATARACT EXTRACTION Bilateral 2022     SECTION  , 1984    CHOLECYSTECTOMY  2014    + stones    COLONOSCOPY N/A 2022    Procedure: COLONOSCOPY with biopsies , polypectomy x3;  Surgeon: Antonio Macias MD;  Location: [facility] ENDOSCOPY;  Service: Gastroenterology;  Laterality: N/A;    ENDOSCOPY      ENDOSCOPY N/A 10/15/2019    Procedure: ESOPHAGOGASTRODUODENOSCOPY WITH BIOPSY AND ESOPHAGEAL DILATATION;  Surgeon: Ambrocio Solorzano MD;  Location: [facility] ENDOSCOPY;  Service: Gastroenterology    ENDOSCOPY  "N/A 2022    Procedure: ESOPHAGOGASTRODUODENOSCOPY with biopsy;  Surgeon: Antonio Macias MD;  Location: [facility] ENDOSCOPY;  Service: Gastroenterology;  Laterality: N/A;    HYSTERECTOMY      SHOULDER ACROMIOPLASTY      SUBTOTAL HYSTERECTOMY      TOOTH EXTRACTION      TUBAL ABDOMINAL LIGATION  1982    UPPER GASTROINTESTINAL ENDOSCOPY  10/15/2019       Family History:   Family History   Problem Relation Age of Onset    Heart disease Mother     Diabetes Mother     Dementia Mother     Stroke Mother     Vision loss Mother     Heart disease Father     Parkinsonism Father     Dementia Father     Diabetes Father     Heart disease Brother     Diabetes Maternal Uncle     Cancer Maternal Grandfather     Diabetes Maternal Grandfather     Hyperlipidemia Maternal Grandmother     Cirrhosis Neg Hx     Liver cancer Neg Hx     Liver disease Neg Hx     Colon cancer Neg Hx     Crohn's disease Neg Hx     Ulcerative colitis Neg Hx     Esophageal cancer Neg Hx     Stomach cancer Neg Hx    Mother non-melanoma skin cancer,  of stroke  MGF lung cancer, smoking associated    Social History:   Social History     Socioeconomic History    Marital status:    Tobacco Use    Smoking status: Former     Current packs/day: 0.00     Average packs/day: 1 pack/day for 56.4 years (56.4 ttl pk-yrs)     Types: Cigarettes     Start date:      Quit date: 2025     Years since quittin.1     Passive exposure: Current    Smokeless tobacco: Never   Vaping Use    Vaping status: Never Used   Substance and Sexual Activity    Alcohol use: No     Comment: \"not in 30 years\"    Drug use: No    Sexual activity: Not Currently     Partners: Male     Birth control/protection: Hysterectomy   Quit smoking Mothers Day     Medications:     Current Outpatient Medications:     albuterol sulfate  (90 Base) MCG/ACT inhaler, Inhale 2 puffs Every 4 (Four) Hours As Needed for Wheezing. Indications: 90 day supply, Disp: 18 g, Rfl: " 3    ascorbic acid (VITAMIN C) 1000 MG tablet, Take 1 tablet by mouth Daily., Disp: , Rfl:     aspirin 81 MG EC tablet, Take 1 tablet by mouth Daily., Disp: 90 tablet, Rfl: 2    atorvastatin (LIPITOR) 80 MG tablet, Take 1 tablet by mouth Daily., Disp: 90 tablet, Rfl: 0    B Complex Vitamins (VITAMIN B COMPLEX PO), Take 2 capsules by mouth Daily., Disp: , Rfl:     busPIRone (BUSPAR) 5 MG tablet, Take 1 tablet by mouth 3 (Three) Times a Day., Disp: 90 tablet, Rfl: 2    cholecalciferol (VITAMIN D3) 25 MCG (1000 UT) tablet, Take 5 tablets by mouth Daily., Disp: , Rfl:     ciclopirox (PENLAC) 8 % solution, APPLY TO THE AFFECTED AREA ONCE DAILY, ONCE WEEKLY FILE WITH [place] BOARD AND USE ALCOHOL TO REMOVE EXCESS ONCE WEEKLY, Disp: , Rfl:     ipratropium-albuterol (DUO-NEB) 0.5-2.5 mg/3 ml nebulizer, Take 3 mL by nebulization 4 (Four) Times a Day As Needed for Wheezing., Disp: 1080 mL, Rfl: 3    isosorbide mononitrate (IMDUR) 120 MG 24 hr tablet, Take 1 tablet by mouth Every Morning., Disp: 90 tablet, Rfl: 0    metoprolol succinate XL (TOPROL-XL) 25 MG 24 hr tablet, Take 1 tablet by mouth Daily., Disp: 90 tablet, Rfl: 3    nitroglycerin (NITROSTAT) 0.4 MG SL tablet, Place 1 tablet under the tongue Every 5 (Five) Minutes As Needed for Chest Pain. Take no more than 3 doses in 15 minutes., Disp: 25 tablet, Rfl: 0    pantoprazole (PROTONIX) 40 MG EC tablet, 1 po daily in the am 30 minutes before breakfast, Disp: 90 tablet, Rfl: 3    predniSONE (DELTASONE) 20 MG tablet, Take 2 tablets by mouth Daily., Disp: 10 tablet, Rfl: 0    primidone (MYSOLINE) 50 MG tablet, TAKE 2 TABLETS TWICE A DAY, Disp: 360 tablet, Rfl: 3    Umeclidinium-Vilanterol (Anoro Ellipta) 62.5-25 MCG/ACT aerosol powder  inhaler, Inhale 1 puff Daily., Disp: 180 each, Rfl: 3    Allergies:   Allergies   Allergen Reactions    Penicillins Anaphylaxis    Coconut Swelling and Rash     Tongue swells    Codeine Rash    Morphine Swelling       Objective     Vital Signs:  "  Vitals:    25 1040   BP: 160/67   Pulse: 57   Temp: 97.1 °F (36.2 °C)   TempSrc: Infrared   SpO2: 97%   Weight: 71.7 kg (158 lb)   Height: 165.1 cm (65\")   PainSc: 0-No pain    Body mass index is 26.29 kg/m².   Pain Score    25 1040   PainSc: 0-No pain       ECOG Performance Status: 0 - Asymptomatic    Physical Exam:   General: No acute distress. Well appearing   HEENT: Normocephalic, atraumatic. Sclera anicteric.   Neck: supple, no adenopathy.   Cardiovascular: regular rate and rhythm. No murmurs.   Respiratory: Normal rate. Clear to auscultation bilaterally  Abdomen: Soft, nontender, non distended with normoactive bowel sounds  Lymph: no cervical, supraclavicular or axillary adenopathy  Neuro: Alert and oriented x 3. No focal deficits.   Ext: Symmetric, no swelling.   Breast: No palpable masses in either breast or axilla.    Laboratory/Imaging Reviewed:   No visits with results within 2 Week(s) from this visit.   Latest known visit with results is:   Hospital Outpatient Visit on 2025   Component Date Value Ref Range Status    Reference Lab Report 2025    Final                    Value:[facility]  [address] [place] [address]  Phone: [phone] or [phone]  Fax: [phone]  Lencho Cao M.D., Medical Director    PATIENT NAME                           LABORATORY NO.  [facility]  [facility]                     [id number]  [id number]                         AGE              SEX  N           CLIENT REF #  [facility] BREAST             1955  F    [id number]   [id number]    CENTER                             REQUESTING M.D.     ATTENDING M.D.     COPY TO.  [address]                     [address]                            DATE COLLECTED      DATE RECEIVED      DATE REPORTED  [facility] [address]                   2025    DIAGNOSIS:  BREAST, BIOPSY, NEEDLE " CORES, RIGHT:  Ductal carcinoma in situ, intermediate nuclear grade, cribriform and comedo  patterns with associated calcifications, see comment  Estrogen receptor: Positive, 100%,                           3+  Progesterone receptor: Positive, 90%, 3+    AVH    COMMENT:  E-cadherin is positive, confirming ductal origin.  Smooth muscle  myosin heavy chain and p63 show retained myoepithelial cells surrounding all  ducts, helping to exclude invasion.  All stains are performed with adequate  control.    BREAST BIOMARKER REPORTING TEMPLATE performed on block A1:  Estrogen Receptor (ER) Status: Positive, 100%, average intensity: 3+  Progesterone Receptor (PgR) Status: Positive, 90%, average intensity: 3+    The specimen meets requirements specified in the latest version of ASCO/CAP  guidelines. Estrogen receptor ([place] clone SP1, polymer, IVD), progesterone  receptor ([place] clone IE2, polymer, IVD), and HER-2/delma oncoprotein  ([place] clone 4B5, polymer, IVD) are performed on buffered formalin fixed,  paraffin embedded tissue, performed according to FDA approved protocol,  interpreted by the 2018 ASCO/CAP guidelines, and reported by the 2025 CAP  template. Variables that can affect the                           reliability of the assays include cold  ischemia time prior to fixation and total fixation time outside of ASCO/CAP  guidelines, processing with decalcification agents, and fixatives other than 10%  neutral buffered formalin.    CLINICAL HISTORY:  Right breast calcifications    SPECIMENS RECEIVED:  BREAST, BIOPSY, NEEDLE CORES , RIGHT    MICROSCOPIC DESCRIPTION:  Tissue blocks are prepared and slides are examined microscopically on all  specimens. See diagnosis for details.    The internal and external (both positive and negative) controls reacted  appropriately. Some of our immunohistochemical and in situ hybridization  studies are performed as analyte specific reagents. The following statement  applies to  "those tests: This test was developed, and its performance  characteristics determined by [facility]. It has not been  cleared or approved by the US Food and Drug Administration. However, the  FDA has determined that approval and clearance are not                           necessary.    Professional interpretation rendered by Danna Mendoza D.O. at [employer], [employer], [address] [place] [address].    GROSS DESCRIPTION:  Labeled \"right breast calcifications\", consisting of multiple tan-yellow breast  needle cores ranging in size from 0.3 x 0.2 x 0.1 cm to 3.3 x 0.4 x 0.3 cm.  Specimen is submitted entirely in cassettes A1-A4 with a total time in formalin  greater than 6 and less than 72 hours, cold ischemic time of 6 minutes.  SOG    REVIEWED, DIAGNOSED AND ELECTRONICALLY  SIGNED BY:    Danna Mendoza D.O.  CPT CODES:  57346, 88360x2, 88341x2, 59796         No results found.    Procedures    Assessment / Plan      Assessment/Plan:   Right breast DCIS (ER/UT positive)  I reviewed the patient's history, imaging and pathology as outlined in the HPI.   We reviewed that DCIS represents a non-invasive or \"precancerous\" condition.  We discussed standard of care treatment would be surgery to remove the existing DCIS and exclude an invasive component.  We reviewed that surgery is the only curative treatment for DCIS.  We discussed options after surgery to lower the risk for recurrence or development of a new primary include adjuvant radiation and endocrine prophylaxis.  However, some women may elect against additional treatment after curative intent surgery and elect instead to proceed with close mammography surveillance.      We discussed an alternative option for patients who are not candidates for surgery due to medical conditions, or who elect against surgical intervention, is primary endocrine therapy.  We discussed that the goal of endocrine therapy would be to control the DCIS, and that " it is not curative.  We discussed that at some point it is likely that the DCIS would progress despite endocrine therapy, and then surgical resection should be reconsidered.  We did discuss schedule and side effects of anastrozole in some detail. Potential side effects include but not limited to fracture, bone loss, arthralgias, risk for accelerated cardiovascular disease/stroke, and vasomotor symptoms.   At the end of our conversation, the patient elected to consider her options further and was amenable to a second opinion surgical consultation.    If she elects for surgical resection, I will see her back afterwards.  If she declined surgical resection, she will contact me if she elects for anastrozole trial.     Patient Instructions   -Call after your consult with Dr. SUAREZ with an update on your decision. If you decide against surgery and would like to try the AI, I will call it in for you. If you decide to proceed with surgery, we should schedule a follow up 2 weeks after to review pathology and recommendations. If you have more questions or are undecided we can schedule a follow up.     2. Bone health  -Aromatase inhibitor use is associated with risk for accelerated age related bone loss.  -She will need baseline DEXA at the time of endocrine therapy initiation.    -Plan to repeat DEXA every 2-years while on endocrine therapy.    -I encourage adequate calcium intake targeting 500 -1000 mg daily through diet or supplement, vitamin D supplementation of at least 1000 IU daily. Weightbearing exercise as tolerated can also help maintain bone density.    3. Genetics  -She requests referral for GC    Follow Up:   Pending her decision     Brandi Adamson MD  Hematology and Oncology

## 2025-07-10 ENCOUNTER — PATIENT OUTREACH (OUTPATIENT)
Dept: OTHER | Facility: HOSPITAL | Age: 70
End: 2025-07-10
Payer: MEDICARE

## 2025-07-10 ENCOUNTER — TRANSCRIBE ORDERS (OUTPATIENT)
Dept: PHYSICAL THERAPY | Facility: HOSPITAL | Age: 70
End: 2025-07-10
Payer: MEDICARE

## 2025-07-10 ENCOUNTER — TELEPHONE (OUTPATIENT)
Dept: CARDIOLOGY | Facility: CLINIC | Age: 70
End: 2025-07-10
Payer: MEDICARE

## 2025-07-10 DIAGNOSIS — Z17.0 MALIGNANT NEOPLASM OF UPPER-OUTER QUADRANT OF RIGHT BREAST IN FEMALE, ESTROGEN RECEPTOR POSITIVE: Primary | ICD-10-CM

## 2025-07-10 DIAGNOSIS — C50.911 MALIGNANT NEOPLASM OF RIGHT FEMALE BREAST, UNSPECIFIED ESTROGEN RECEPTOR STATUS, UNSPECIFIED SITE OF BREAST: Primary | ICD-10-CM

## 2025-07-10 DIAGNOSIS — C50.411 MALIGNANT NEOPLASM OF UPPER-OUTER QUADRANT OF RIGHT BREAST IN FEMALE, ESTROGEN RECEPTOR POSITIVE: Primary | ICD-10-CM

## 2025-07-10 NOTE — SIGNIFICANT NOTE
Met with patient and daughter with Dr. SUAREZ to discuss new breast cancer diagnosis. Pathology of IG DCIS ER+ ME+ reviewed. Surgical options of lumpectomy with possible radiation vs mastectomy discussed. Patient leaning towards lumpectomy at this time. Will obtain MRI to assist with decision-making. NN reviewed community and educational resources. Encouraged to call with any questions or concerns.    07/10/25 1338   Nurse Navigation   Current Status Active   Type of Visit New patient   Location of Visit [facility]   Visit Diagnosis Breast - malignant   Referral Source Health professional - outpatient   Treatments Surgery   Date of Diagnosis 05/29/25   Surgery - First Consult Appointment 07/10/25   Intervention Tasks Performed Education;Symptom management;Community resources;Cancer prevention/Screening;Supportive services referral   Supportive services referral Bioimpedance/Lymphedema   Time Navigated Today (Min) 60   Total Time Navigated (Min) 60   Acuity Rating   Time spent with patient 3- greater than 45 minutes   Multimodality treatment coordination and education 2-  Arrange, transfer care, second opinion   Caregiver support 2- Lives alone but has support   Distress score 1- 0-3   Coordination of care  - appts 1- Single appt assistance   Appt compliance 1- Compliant   ECOG/Karnofsky Score 1- ECOG -Less than or equal to 1,  Karnofsky 90 or greater   PHQ 9 score  1- <10   Referrals to support services 1- None   Acuity score 13   Acuity level Medium acuity

## 2025-07-10 NOTE — TELEPHONE ENCOUNTER
Cardiac Risk Assessment  Procedure: lumpectomy vs mastectomy   LS: 4/15/25  F/up: 11/4/25  Stress: 11/16/22  Echo: 4/29/22  Cath:  98/19        Form filled out and placed on Sebastian's desk.

## 2025-07-11 ENCOUNTER — CLINICAL SUPPORT (OUTPATIENT)
Facility: HOSPITAL | Age: 70
End: 2025-07-11
Payer: MEDICARE

## 2025-07-11 DIAGNOSIS — Z17.0 MALIGNANT NEOPLASM OF RIGHT BREAST IN FEMALE, ESTROGEN RECEPTOR POSITIVE, UNSPECIFIED SITE OF BREAST: ICD-10-CM

## 2025-07-11 DIAGNOSIS — Z80.3 FAMILY HISTORY OF MALIGNANT NEOPLASM OF BREAST: ICD-10-CM

## 2025-07-11 DIAGNOSIS — C50.911 MALIGNANT NEOPLASM OF RIGHT BREAST IN FEMALE, ESTROGEN RECEPTOR POSITIVE, UNSPECIFIED SITE OF BREAST: ICD-10-CM

## 2025-07-11 DIAGNOSIS — Z13.79 GENETIC TESTING: Primary | ICD-10-CM

## 2025-07-11 NOTE — PROGRESS NOTES
Genetic counseling was provided via telehealth.  Patient's name and date of birth was confirmed and confirmed that patient was physically located in [place] at the time of the appointment.    Itzel Hagen, a 69 y.o. female, was referred for genetic counseling due to a personal history of breast cancer. She was recently diagnosed with a right DCIS at age 69 and is in the process of making a surgical decision. Ms. Hagen had a hysterectomy in her 30s; her ovaries remain intact. Three colon polyps were removed on her first colonoscopy in 2022. She was interested in discussing her risk for a hereditary cancer syndrome. Ms. Hagen was interested in pursuing a multigene panel, and therefore the CancerNext-Expanded panel was ordered through [facility] which analyzes BRCA1/2 and 75 additional genes associated with an increased cancer risk.     FAMILY HISTORY:  Mother:   Skin cancer  Mat. Uncle:   Possible pancreatic cancer, metastatic and in lymph nodes  Mat. Aunt:   Cervical cancer  Mat. 1st cousin:  Breast cancer, mets to liver, 50    We do not have medical records confirming the diagnoses in Ms. Hagen's family.    RISK ASSESSMENT: Ms. Hagen's personal history of breast cancer led to concern regarding a hereditary cancer syndrome.  She meets NCCN guidelines criteria for BRCA1/2 testing based on her personal and family history of breast cancer. The standard approach to genetic testing is through a multigene panel.     GENETIC COUNSELING:  We reviewed the family history information in detail.  Cases of cancer follow three general patterns: sporadic, familial, and hereditary.  While most cancer is sporadic, some cases appear to occur in family clusters.  These cases are said to be familial and account for 10-20% of certain cancer cases.  Familial cases may be due to a combination of shared genes and environmental factors among family members.  In even fewer families (~10%), the cancer is said to  be inherited, and the genes responsible for the cancer are known.      Family histories typical of hereditary cancer syndromes usually include multiple first- and second-degree relatives diagnosed with cancer types that define a syndrome.  These cases tend to be diagnosed at younger-than-expected ages and can be bilateral or multifocal.  The cancer in these families follows an autosomal dominant inheritance pattern, which indicates the likely presence of a mutation in a cancer susceptibility gene.  Children and siblings of an individual believed to carry this mutation have a 50% chance of inheriting that mutation, thereby inheriting the increased risk to develop cancer.  These mutations can be passed down from the maternal or the paternal lineage.    Hereditary breast cancer accounts for 5-10% of all cases of breast cancer.  A significant proportion of hereditary breast and ovarian cancer can be attributed to mutations in the BRCA1 and BRCA2 genes.  Mutations in these genes confer an increased risk for breast cancer, ovarian cancer, male breast cancer, prostate cancer, and pancreatic cancer. Women with a BRCA1 or BRCA2 mutation who have already been diagnosed with breast cancer have an increased risk of developing a second breast cancer. Women with a BRCA1 or BRCA2 mutation have up to a 44% risk of ovarian cancer.      The standard approach to genetic testing is via a multigene panel.  Genes included on these panels have varying degrees of risk associated, and management and screening guidelines vary based on the specific gene.  Hereditary cancer syndromes can demonstrate incomplete penetrance and variable expression within families. There are genes that are evaluated that have been more recently described, and there may be less data regarding the risks and therefore may not have established management guidelines at this time. We reviewed that in some cases, the identification of a genetic mutation may impact  treatment options for some types of cancer. We discussed the possibility of results that are unexpected based on family history. We discussed these limitations at length. Based on Ms. Hagen's personal history and her desire to get more information regarding her personal risks and risks for her family, she opted to pursue testing through a panel evaluating several other genes known to increase the risk for cancer.     GENETIC TESTING:  The risks, benefits and limitations of genetic testing and implications for clinical management following testing were reviewed. DNA test results can influence decisions regarding screening and prevention.  Genetic testing can have significant psychological implications for both individuals and families. Also discussed was the possibility of employment and insurance discrimination based on genetic test results and the federal and states laws that are in place to prevent this (RONNELL), as well as the limitations of these laws.         We discussed multigene panel testing, which would involve testing several genes associated with an increased cancer risk. The benefits and limitations of genetic testing were discussed and Ms. Hagen decided to pursue testing of the genes via the panel. The implications of a positive or negative test result were discussed.  We discussed the possibility that, in some cases, genetic test results may be ambiguous due to the identification of a genetic variant of uncertain significance (VUS). These variants may or may not be associated with an increased cancer risk. With multigene panel testing, it is not uncommon for a VUS to be identified.  If a VUS is identified, testing family members is not recommended and screening recommendations are made based on the family history.  The laboratories that perform genetic testing work to reclassify the VUS and send out an amended report if and when a VUS is reclassified.  The majority of variant findings are  ultimately reclassified to a negative result. Given her personal and family history, a negative test result does not eliminate all cancer risk to her relatives, although the risk would not be as high as it would with positive genetic testing.     PLAN:  Genetic testing via the BRCAplus panel and CancerNext-Expanded panel through [facility] was ordered. A blood sample will be collected at the lab at [facility] on 7/17/2025. Results from the high/moderate risk breast cancer genes (BRCAplus panel) are expected in 7-10 days, and results from the remainder of the panel are expected in 2-3 weeks.  Ms. Hagen is welcome to contact us in the meantime with any questions she may have at [phone].       Ene Salinas MS, [facility], [facility]  Licensed Certified Genetic Counselor      Total time spent caring for the patient today was 50 minutes. This time includes chart review, time spent during the visit, and time spent after the visit on documentation and follow-up.

## 2025-07-15 ENCOUNTER — OFFICE VISIT (OUTPATIENT)
Dept: FAMILY MEDICINE CLINIC | Facility: CLINIC | Age: 70
End: 2025-07-15
Payer: MEDICARE

## 2025-07-15 VITALS
DIASTOLIC BLOOD PRESSURE: 88 MMHG | WEIGHT: 161 LBS | HEART RATE: 88 BPM | OXYGEN SATURATION: 98 % | TEMPERATURE: 97.8 F | SYSTOLIC BLOOD PRESSURE: 138 MMHG | BODY MASS INDEX: 26.82 KG/M2 | HEIGHT: 65 IN

## 2025-07-15 DIAGNOSIS — F41.9 ANXIETY: Primary | ICD-10-CM

## 2025-07-15 DIAGNOSIS — D49.3 BREAST NEOPLASM: ICD-10-CM

## 2025-07-16 NOTE — PROGRESS NOTES
"Chief Complaint  Anxiety    Subjective          Itzel Hagen presents to [facility]  Anxiety    History of Present Illness  The patient presents for follow-up on breast cancer, anxiety, neck pain, and hip pain.    She has been under the care of an oncologist who suggested the possibility of medication. However, she was also advised to consult with another surgeon. This surgeon recommended surgical intervention, explaining that post-surgery, she may not require medication or radiation therapy. An MRI of her breast was ordered to confirm the presence of a single lesion, and the procedure is scheduled for tomorrow. The surgeon informed her that the lesion is so small it could not be detected through self-examination. Genetic testing has also been arranged for her and her family members. Her next appointment with the oncologist is contingent on the completion of the MRI, and they aim to have some genetic testing results available before this visit. 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 She has four children, all of whom she  for approximately a year each.    She continues to take BuSpar, which she finds beneficial. She typically takes it in the morning upon waking up. She reports no adverse effects from the medication and has refills available.    She has an upcoming appointment with a neurosurgeon on 08/19/2025 for her neck pain.    She also plans to seek treatment for her hip pain, which is causing numbness in her foot. Previous chiropractic treatment did not alleviate her symptoms.    FAMILY HISTORY  Her sister had breast cancer and liver cancer.    Review of Systems      Objective   Vital Signs:   /88 (BP Location: Right arm, Patient Position: Sitting, Cuff Size: Large Adult)   Pulse 88   Temp 97.8 °F (36.6 °C) (Temporal)   Ht 165.1 cm (65\")   Wt 73 kg (161 lb)   SpO2 98%   BMI 26.79 kg/m²     Physical " Exam      Physical Exam  Constitutional:       General: She is not in acute distress.     Appearance: Normal appearance. She is well-developed and well-groomed. She is not ill-appearing, toxic-appearing or diaphoretic.   HENT:      Head: Normocephalic.      Nose: Nose normal. No congestion or rhinorrhea.      Mouth/Throat:      Mouth: Mucous membranes are moist.      Pharynx: Oropharynx is clear. No oropharyngeal exudate or posterior oropharyngeal erythema.   Eyes:      General: Lids are normal.         Right eye: No discharge.         Left eye: No discharge.      Extraocular Movements: Extraocular movements intact.      Pupils: Pupils are equal, round, and reactive to light.   Neck:      Vascular: No carotid bruit.   Cardiovascular:      Rate and Rhythm: Normal rate and regular rhythm.      Pulses: Normal pulses.      Heart sounds: Normal heart sounds. No murmur heard.     No friction rub. No gallop.   Pulmonary:      Effort: Pulmonary effort is normal. No respiratory distress.      Breath sounds: Normal breath sounds. No stridor. No wheezing, rhonchi or rales.   Chest:      Chest wall: No tenderness.   Abdominal:      General: Bowel sounds are normal. There is no distension.      Palpations: Abdomen is soft. There is no mass.      Tenderness: There is no abdominal tenderness. There is no right CVA tenderness, left CVA tenderness, guarding or rebound.      Hernia: No hernia is present.   Musculoskeletal:         General: No swelling or tenderness. Normal range of motion.      Cervical back: Normal range of motion and neck supple. No rigidity or tenderness.      Right lower leg: No edema.      Left lower leg: No edema.   Lymphadenopathy:      Cervical: No cervical adenopathy.   Skin:     General: Skin is warm.      Capillary Refill: Capillary refill takes less than 2 seconds.      Coloration: Skin is not jaundiced.      Findings: No bruising, erythema or rash.   Neurological:      General: No focal deficit present.       Mental Status: She is alert and oriented to person, place, and time.      Motor: Motor function is intact. No weakness.      Coordination: Coordination is intact.      Gait: Gait is intact. Gait normal.   Psychiatric:         Attention and Perception: Attention normal.         Mood and Affect: Mood normal.         Speech: Speech normal.         Behavior: Behavior normal.         Cognition and Memory: Cognition normal.         Judgment: Judgment normal.        Result Review :          Results                Assessment and Plan    Diagnoses and all orders for this visit:    1. Anxiety (Primary)  Comments:  continue buspar    2. Breast neoplasm  Comments:  continue with MRI      Assessment & Plan  1. Breast cancer.  - Advised by her surgeon to undergo surgery to remove the tumor, with the possibility of avoiding medication and radiation post-surgery.  - MRI of the breast is scheduled for tomorrow to ensure there is only one spot.  - Genetic testing has been set up to assess the risk for her and her family members.  - Follow-up with Dr. Doherty after the MRI and part of the genetic testing are completed.    2. Anxiety.  - Currently taking BuSpar in the morning, which is helping manage her anxiety without causing any side effects.  - Reports no adverse effects from the medication.  - Advised that she can stop the medication if needed and has refills available.  - Medication sent to pharmacy.    3. Neck pain.  - Appointment with a neurosurgeon on 08/19/2025 to address neck pain.  - Previous appointment with a neurologist on 08/07/2025.    4. Hip pain.  - Reports significant hip pain radiating down to her foot, causing numbness.  - Likely due to a nerve issue in her back.  - Advised to discuss potential treatment options, including injections, with her neurosurgeon.    Patient's Body mass index is 26.79 kg/m². indicating that she is overweight (BMI 25-29.9). Patient's (Body mass index is 26.79 kg/m².) indicates that they  are overweight with health conditions that include GERD . Weight is unchanged. BMI is above average; BMI management plan is completed. We discussed low calorie, low carb based diet program, portion control, and increasing exercise. .    Follow Up   Return in about 2 months (around 9/15/2025).  Patient was given instructions and counseling regarding her condition or for health maintenance advice. Please see specific information pulled into the AVS if appropriate.     This document has been electronically signed by DEWAYNE Dunbar  July 16, 2025 09:00 EDT     Patient or patient representative verbalized consent for the use of Ambient Listening during the visit with  DEWAYNE Dunbar for chart documentation. 7/16/2025  08:59 EDT

## 2025-07-17 ENCOUNTER — CLINICAL SUPPORT (OUTPATIENT)
Facility: HOSPITAL | Age: 70
End: 2025-07-17
Payer: MEDICARE

## 2025-07-17 ENCOUNTER — LAB (OUTPATIENT)
Facility: HOSPITAL | Age: 70
End: 2025-07-17
Payer: MEDICARE

## 2025-07-17 ENCOUNTER — HOSPITAL ENCOUNTER (OUTPATIENT)
Dept: MRI IMAGING | Facility: HOSPITAL | Age: 70
Discharge: HOME | End: 2025-07-17
Payer: MEDICARE

## 2025-07-17 DIAGNOSIS — D05.11 INTRADUCTAL CARCINOMA IN SITU OF RIGHT BREAST: ICD-10-CM

## 2025-07-17 DIAGNOSIS — Z13.79 GENETIC TESTING: Primary | ICD-10-CM

## 2025-07-17 DIAGNOSIS — C50.911 MALIGNANT NEOPLASM OF RIGHT BREAST IN FEMALE, ESTROGEN RECEPTOR POSITIVE, UNSPECIFIED SITE OF BREAST: ICD-10-CM

## 2025-07-17 DIAGNOSIS — Z17.0 MALIGNANT NEOPLASM OF RIGHT BREAST IN FEMALE, ESTROGEN RECEPTOR POSITIVE, UNSPECIFIED SITE OF BREAST: ICD-10-CM

## 2025-07-17 PROCEDURE — C8937 CAD BREAST MRI: HCPCS

## 2025-07-17 PROCEDURE — C8908 MRI W/O FOL W/CONT, BREAST,: HCPCS

## 2025-07-17 PROCEDURE — A9577 INJ MULTIHANCE: HCPCS | Performed by: SURGERY

## 2025-07-17 PROCEDURE — 25510000002 GADOBENATE DIMEGLUMINE 529 MG/ML SOLUTION: Performed by: SURGERY

## 2025-07-17 RX ADMIN — GADOBENATE DIMEGLUMINE 14 ML: 529 INJECTION, SOLUTION INTRAVENOUS at 14:50

## 2025-07-18 NOTE — PROGRESS NOTES
Blood sample collected and sent to [facility] for germline genetic testing as discussed on 7/11/2025.        Ene Salinas MS, [facility], [facility]  Licensed Certified Genetic Counselor

## 2025-07-22 ENCOUNTER — TELEPHONE (OUTPATIENT)
Dept: MRI IMAGING | Facility: HOSPITAL | Age: 70
End: 2025-07-22
Payer: MEDICARE

## 2025-07-22 NOTE — TELEPHONE ENCOUNTER
Called patient with MRI Breast results. Recommended surgical follow up. Patient was provided number to the HELP line after questions were asked and answered. An email was sent to the Nurse Navigator. Pt expressed understanding and was encouraged to call with any further questions or concerns.

## 2025-07-25 ENCOUNTER — TRANSCRIBE ORDERS (OUTPATIENT)
Dept: ADMINISTRATIVE | Facility: HOSPITAL | Age: 70
End: 2025-07-25
Payer: MEDICARE

## 2025-07-25 DIAGNOSIS — D05.11 INTRADUCTAL CARCINOMA IN SITU OF RIGHT BREAST: Primary | ICD-10-CM

## 2025-07-28 ENCOUNTER — TRANSCRIBE ORDERS (OUTPATIENT)
Dept: OCCUPATIONAL THERAPY | Facility: HOSPITAL | Age: 70
End: 2025-07-28
Payer: MEDICARE

## 2025-07-28 ENCOUNTER — DOCUMENTATION (OUTPATIENT)
Dept: GENETICS | Facility: HOSPITAL | Age: 70
End: 2025-07-28
Payer: MEDICARE

## 2025-07-28 ENCOUNTER — TELEPHONE (OUTPATIENT)
Dept: GENETICS | Facility: HOSPITAL | Age: 70
End: 2025-07-28
Payer: MEDICARE

## 2025-07-28 DIAGNOSIS — C50.911 MALIGNANT NEOPLASM OF RIGHT FEMALE BREAST, UNSPECIFIED ESTROGEN RECEPTOR STATUS, UNSPECIFIED SITE OF BREAST: Primary | ICD-10-CM

## 2025-07-28 NOTE — PROGRESS NOTES
Ms. Hagen was contacted on 7/28/2025 to discuss results of genetic testing.   The high/moderate breast cancer risk portion of the panel was resulted out first in order to expedite surgical decision making, and testing found no pathogenic (harmful) mutations in BUNNY, BARD1, BRCA1/2, CHEK2, CDH1, NF1, PALB2, PTEN, RAD51C, RAD51D, STK11, and TP53.  The remainder of her 77-gene panel, including lower risk genes, non-breast cancer related genes, and RNA analysis is still pending, and the patient will be contacted once those results are available. Full summary note, pedigree, and results will be sent to patient, referring provider, and treating physicians once full panel results are back.        Emanuel Jones MS, [facility]  Genetic Counselor  [facility]    Cc:  Brandi Ruelas MD Abou-Jaoude, Walid A, MD Yates, Abby MELVIN RN

## 2025-07-31 ENCOUNTER — DOCUMENTATION (OUTPATIENT)
Dept: GENETICS | Facility: HOSPITAL | Age: 70
End: 2025-07-31
Payer: MEDICARE

## 2025-07-31 ENCOUNTER — TELEPHONE (OUTPATIENT)
Dept: GENETICS | Facility: HOSPITAL | Age: 70
End: 2025-07-31
Payer: MEDICARE

## 2025-07-31 NOTE — TELEPHONE ENCOUNTER
Spoke with patient and disclosed negative genetic results for the 77 gene panel. Informed patient these results would be on [employer] and sent to their Dr. Patient requested a copy be mailed to them.

## 2025-07-31 NOTE — PROGRESS NOTES
Itzel Hagen, a 69 y.o. female, was referred for genetic counseling due to a personal history of breast cancer. She was recently diagnosed with a right DCIS at age 69 and is in the process of making a surgical decision. Ms. Hagen had a hysterectomy in her 30s; her ovaries remain intact. Three colon polyps were removed on her first colonoscopy in 2022. She was interested in discussing her risk for a hereditary cancer syndrome. Ms. Hagen was interested in pursuing a multigene panel, and therefore the CancerNext-Expanded panel was ordered through [facility] which analyzes BRCA1/2 and 75 additional genes associated with an increased cancer risk. The genes on this panel include AIP, ALK, APC, BUNNY, AXIN2, BAP1, BARD1, BMPR1A, BRCA1, BRCA2, BRIP1, CDC73, CDH1, CDK4, CDKN1B, CDKN2A, CEBPA, CHEK2, CTNNA1, DDX41, DICER1, EGFR, EPCAM, ETV6, FH, FLCN, GATA2, GREM1, HOXB13, KIT, LZTR1, MAX, MBD4, MEN1, MET, MITF, MLH1, MSH2, MSH3, MSH6, MUTYH, NF1, NF2, NTHL1, PALB2, PDGFRA, PHOX2B, PMS2, POLD1, POLE, POT1, YYWYQ3P, PTCH1, PTEN, RAD51C, RAD51D, RB1, RET, RPS20, RUNX1, SDHA, SDHAF2,  SDHB, SDHC, SDHD, SMAD4, SMARCA4, SMARCB1, SMARCE1, STK11, SUFU, RUOS858, TP53, TSC1, TSC2, VHL, and WT1. Genetic testing was negative for pathogenic mutations in BRCA1/2 and 75 additional genes included on the CancerNext-Expanded panel. These results were discussed with Ms. Hagen by telephone on 7/31/2025.    FAMILY HISTORY:  Mother:                                    Skin cancer  Mat. Uncle:                              Possible pancreatic cancer, metastatic and in lymph nodes  Mat. Aunt:                                Cervical cancer  Mat. 1st cousin:                       Breast cancer, mets to liver, 50     We do not have medical records confirming the diagnoses in Ms. Hagen's family.     RISK ASSESSMENT: Ms. Hagen's personal history of breast cancer led to concern regarding a hereditary cancer syndrome.  She  meets NCCN guidelines criteria for BRCA1/2 testing based on her personal and family history of breast cancer. The standard approach to genetic testing is through a multigene panel.      GENETIC COUNSELING:  We reviewed the family history information in detail.  Cases of cancer follow three general patterns: sporadic, familial, and hereditary.  While most cancer is sporadic, some cases appear to occur in family clusters.  These cases are said to be familial and account for 10-20% of certain cancer cases.  Familial cases may be due to a combination of shared genes and environmental factors among family members.  In even fewer families (~10%), the cancer is said to be inherited, and the genes responsible for the cancer are known.       Family histories typical of hereditary cancer syndromes usually include multiple first- and second-degree relatives diagnosed with cancer types that define a syndrome.  These cases tend to be diagnosed at younger-than-expected ages and can be bilateral or multifocal.  The cancer in these families follows an autosomal dominant inheritance pattern, which indicates the likely presence of a mutation in a cancer susceptibility gene.  Children and siblings of an individual believed to carry this mutation have a 50% chance of inheriting that mutation, thereby inheriting the increased risk to develop cancer.  These mutations can be passed down from the maternal or the paternal lineage.     Hereditary breast cancer accounts for 5-10% of all cases of breast cancer.  A significant proportion of hereditary breast and ovarian cancer can be attributed to mutations in the BRCA1 and BRCA2 genes.  Mutations in these genes confer an increased risk for breast cancer, ovarian cancer, male breast cancer, prostate cancer, and pancreatic cancer. Women with a BRCA1 or BRCA2 mutation who have already been diagnosed with breast cancer have an increased risk of developing a second breast cancer. Women with a BRCA1  or BRCA2 mutation have up to a 44% risk of ovarian cancer.       The standard approach to genetic testing is via a multigene panel.  Genes included on these panels have varying degrees of risk associated, and management and screening guidelines vary based on the specific gene.  Hereditary cancer syndromes can demonstrate incomplete penetrance and variable expression within families. There are genes that are evaluated that have been more recently described, and there may be less data regarding the risks and therefore may not have established management guidelines at this time. We reviewed that in some cases, the identification of a genetic mutation may impact treatment options for some types of cancer. We discussed the possibility of results that are unexpected based on family history. We discussed these limitations at length. Based on Ms. Hagen's personal history and her desire to get more information regarding her personal risks and risks for her family, she opted to pursue testing through a panel evaluating several other genes known to increase the risk for cancer.      GENETIC TESTING:  The risks, benefits and limitations of genetic testing and implications for clinical management following testing were reviewed. DNA test results can influence decisions regarding screening and prevention.  Genetic testing can have significant psychological implications for both individuals and families. Also discussed was the possibility of employment and insurance discrimination based on genetic test results and the federal and states laws that are in place to prevent this (RON[place]), as well as the limitations of these laws.         We discussed multigene panel testing, which would involve testing several genes associated with an increased cancer risk. The benefits and limitations of genetic testing were discussed and Ms. Hagen decided to pursue testing of the genes via the panel. The implications of a positive or  negative test result were discussed.  We discussed the possibility that, in some cases, genetic test results may be ambiguous due to the identification of a genetic variant of uncertain significance (VUS). These variants may or may not be associated with an increased cancer risk. With multigene panel testing, it is not uncommon for a VUS to be identified.  If a VUS is identified, testing family members is not recommended and screening recommendations are made based on the family history.  The laboratories that perform genetic testing work to reclassify the VUS and send out an amended report if and when a VUS is reclassified.  The majority of variant findings are ultimately reclassified to a negative result. Given her personal and family history, a negative test result does not eliminate all cancer risk to her relatives, although the risk would not be as high as it would with positive genetic testing.     TEST RESULTS:  Genetic testing was negative for pathogenic mutations by sequencing, rearrangement testing, and RNA analysis for the 77 genes on the CancerNext-Expanded panel.  The negative result greatly lowers the risk of a hereditary cancer syndrome for Ms. Hagen. This assessment is based on the information provided at the time of the consultation.    CLINICAL MANAGEMENT GUIDELINES: Ms. Hagen's surveillance and management should be determined by her oncology team. Despite the genetic test results that were negative for known pathogenic mutations, Ms. Hagen's female relatives may have a somewhat increased lifetime risk for breast cancer based on family history. Relatives may have a personalized risk assessment performed to quantify their breast cancer risk using a family history-based risk model, such as the Tyrer-Cuzick model.      PLAN:  Genetic counseling remains available to Ms. Hagen and she is welcome to contact us with any questions at [phone].         Ene Salinas, MS, [facility] [place],  [facility]  Licensed Certified Genetic Counselor       Cc:  MD Izabella Sahni MD

## 2025-08-06 ENCOUNTER — PRE-ADMISSION TESTING (OUTPATIENT)
Dept: PREADMISSION TESTING | Facility: HOSPITAL | Age: 70
End: 2025-08-06
Payer: MEDICARE

## 2025-08-06 ENCOUNTER — HOSPITAL ENCOUNTER (OUTPATIENT)
Dept: PHYSICAL THERAPY | Facility: HOSPITAL | Age: 70
Setting detail: THERAPIES SERIES
Discharge: HOME | End: 2025-08-06
Payer: MEDICARE

## 2025-08-06 VITALS — HEIGHT: 65 IN | BODY MASS INDEX: 27.92 KG/M2 | WEIGHT: 167.55 LBS

## 2025-08-06 DIAGNOSIS — C50.911 MALIGNANT NEOPLASM OF RIGHT FEMALE BREAST, UNSPECIFIED ESTROGEN RECEPTOR STATUS, UNSPECIFIED SITE OF BREAST: Primary | ICD-10-CM

## 2025-08-06 LAB
DEPRECATED RDW RBC AUTO: 41.1 FL (ref 37–54)
ERYTHROCYTE [DISTWIDTH] IN BLOOD BY AUTOMATED COUNT: 12.3 % (ref 12.3–15.4)
HCT VFR BLD AUTO: 42.6 % (ref 34–46.6)
HGB BLD-MCNC: 14.2 G/DL (ref 12–15.9)
MCH RBC QN AUTO: 30.5 PG (ref 26.6–33)
MCHC RBC AUTO-ENTMCNC: 33.3 G/DL (ref 31.5–35.7)
MCV RBC AUTO: 91.6 FL (ref 79–97)
PLATELET # BLD AUTO: 191 10*3/MM3 (ref 140–450)
PMV BLD AUTO: 10.8 FL (ref 6–12)
POTASSIUM SERPL-SCNC: 3.6 MMOL/L (ref 3.5–5.2)
RBC # BLD AUTO: 4.65 10*6/MM3 (ref 3.77–5.28)
WBC NRBC COR # BLD AUTO: 7.36 10*3/MM3 (ref 3.4–10.8)

## 2025-08-06 PROCEDURE — 93005 ELECTROCARDIOGRAM TRACING: CPT

## 2025-08-06 PROCEDURE — 93702 BIS XTRACELL FLUID ANALYSIS: CPT

## 2025-08-06 PROCEDURE — 36415 COLL VENOUS BLD VENIPUNCTURE: CPT

## 2025-08-06 PROCEDURE — 97162 PT EVAL MOD COMPLEX 30 MIN: CPT

## 2025-08-06 PROCEDURE — 85027 COMPLETE CBC AUTOMATED: CPT

## 2025-08-06 PROCEDURE — 84132 ASSAY OF SERUM POTASSIUM: CPT

## 2025-08-11 LAB
QT INTERVAL: 438 MS
QTC INTERVAL: 426 MS

## 2025-08-12 ENCOUNTER — HOSPITAL ENCOUNTER (OUTPATIENT)
Dept: MAMMOGRAPHY | Facility: HOSPITAL | Age: 70
Discharge: HOME | End: 2025-08-12
Payer: MEDICARE

## 2025-08-12 ENCOUNTER — ANESTHESIA (OUTPATIENT)
Dept: PERIOP | Facility: HOSPITAL | Age: 70
End: 2025-08-12
Payer: MEDICARE

## 2025-08-12 ENCOUNTER — HOSPITAL ENCOUNTER (OUTPATIENT)
Facility: HOSPITAL | Age: 70
Setting detail: HOSPITAL OUTPATIENT SURGERY
Discharge: HOME | End: 2025-08-12
Attending: SURGERY | Admitting: SURGERY
Payer: MEDICARE

## 2025-08-12 ENCOUNTER — ANESTHESIA EVENT (OUTPATIENT)
Dept: PERIOP | Facility: HOSPITAL | Age: 70
End: 2025-08-12
Payer: MEDICARE

## 2025-08-12 VITALS
DIASTOLIC BLOOD PRESSURE: 78 MMHG | BODY MASS INDEX: 27.92 KG/M2 | SYSTOLIC BLOOD PRESSURE: 166 MMHG | TEMPERATURE: 97.5 F | HEIGHT: 65 IN | RESPIRATION RATE: 16 BRPM | HEART RATE: 59 BPM | OXYGEN SATURATION: 96 % | WEIGHT: 167.55 LBS

## 2025-08-12 DIAGNOSIS — D05.11 INTRADUCTAL CARCINOMA IN SITU OF RIGHT BREAST: ICD-10-CM

## 2025-08-12 DIAGNOSIS — C50.919 BREAST CANCER: Primary | ICD-10-CM

## 2025-08-12 PROCEDURE — S0260 H&P FOR SURGERY: HCPCS | Performed by: PHYSICIAN ASSISTANT

## 2025-08-12 PROCEDURE — 88307 TISSUE EXAM BY PATHOLOGIST: CPT | Performed by: SURGERY

## 2025-08-12 PROCEDURE — 25010000002 LIDOCAINE PF 1% 1 % SOLUTION: Performed by: ANESTHESIOLOGY

## 2025-08-12 PROCEDURE — 25010000002 BUPIVACAINE 0.5 % SOLUTION: Performed by: SURGERY

## 2025-08-12 PROCEDURE — 25010000002 PROPOFOL 10 MG/ML EMULSION: Performed by: NURSE ANESTHETIST, CERTIFIED REGISTERED

## 2025-08-12 PROCEDURE — 25010000002 LIDOCAINE PF 1% 1 % SOLUTION: Performed by: NURSE ANESTHETIST, CERTIFIED REGISTERED

## 2025-08-12 PROCEDURE — 25010000002 DEXAMETHASONE PER 1 MG: Performed by: NURSE ANESTHETIST, CERTIFIED REGISTERED

## 2025-08-12 PROCEDURE — 25010000002 ONDANSETRON PER 1 MG: Performed by: NURSE ANESTHETIST, CERTIFIED REGISTERED

## 2025-08-12 PROCEDURE — 25010000002 CEFAZOLIN PER 500 MG: Performed by: SURGERY

## 2025-08-12 PROCEDURE — 25010000002 LIDOCAINE 1 % SOLUTION: Performed by: RADIOLOGY

## 2025-08-12 PROCEDURE — 25010000002 FENTANYL CITRATE (PF) 100 MCG/2ML SOLUTION: Performed by: NURSE ANESTHETIST, CERTIFIED REGISTERED

## 2025-08-12 PROCEDURE — C1819 TISSUE LOCALIZATION-EXCISION: HCPCS

## 2025-08-12 PROCEDURE — 25810000003 LACTATED RINGERS PER 1000 ML: Performed by: ANESTHESIOLOGY

## 2025-08-12 PROCEDURE — 76098 X-RAY EXAM SURGICAL SPECIMEN: CPT

## 2025-08-12 RX ORDER — ONDANSETRON 2 MG/ML
INJECTION INTRAMUSCULAR; INTRAVENOUS AS NEEDED
Status: DISCONTINUED | OUTPATIENT
Start: 2025-08-12 | End: 2025-08-12 | Stop reason: SURG

## 2025-08-12 RX ORDER — FAMOTIDINE 10 MG/ML
20 INJECTION, SOLUTION INTRAVENOUS ONCE
Status: CANCELLED | OUTPATIENT
Start: 2025-08-12 | End: 2025-08-12

## 2025-08-12 RX ORDER — HYDROMORPHONE HYDROCHLORIDE 1 MG/ML
0.5 INJECTION, SOLUTION INTRAMUSCULAR; INTRAVENOUS; SUBCUTANEOUS
Status: DISCONTINUED | OUTPATIENT
Start: 2025-08-12 | End: 2025-08-12 | Stop reason: HOSPADM

## 2025-08-12 RX ORDER — FAMOTIDINE 20 MG/1
20 TABLET, FILM COATED ORAL ONCE
Status: COMPLETED | OUTPATIENT
Start: 2025-08-12 | End: 2025-08-12

## 2025-08-12 RX ORDER — BUPIVACAINE HYDROCHLORIDE 5 MG/ML
INJECTION, SOLUTION PERINEURAL AS NEEDED
Status: DISCONTINUED | OUTPATIENT
Start: 2025-08-12 | End: 2025-08-12 | Stop reason: HOSPADM

## 2025-08-12 RX ORDER — TRAMADOL HYDROCHLORIDE 50 MG/1
50 TABLET ORAL EVERY 6 HOURS PRN
Qty: 7 TABLET | Refills: 0 | Status: SHIPPED | OUTPATIENT
Start: 2025-08-12 | End: 2025-08-15

## 2025-08-12 RX ORDER — SODIUM CHLORIDE 0.9 % (FLUSH) 0.9 %
10 SYRINGE (ML) INJECTION AS NEEDED
Status: CANCELLED | OUTPATIENT
Start: 2025-08-12

## 2025-08-12 RX ORDER — LIDOCAINE HYDROCHLORIDE 10 MG/ML
INJECTION, SOLUTION EPIDURAL; INFILTRATION; INTRACAUDAL; PERINEURAL AS NEEDED
Status: DISCONTINUED | OUTPATIENT
Start: 2025-08-12 | End: 2025-08-12 | Stop reason: SURG

## 2025-08-12 RX ORDER — DEXAMETHASONE SODIUM PHOSPHATE 4 MG/ML
INJECTION, SOLUTION INTRA-ARTICULAR; INTRALESIONAL; INTRAMUSCULAR; INTRAVENOUS; SOFT TISSUE AS NEEDED
Status: DISCONTINUED | OUTPATIENT
Start: 2025-08-12 | End: 2025-08-12 | Stop reason: SURG

## 2025-08-12 RX ORDER — FENTANYL CITRATE 50 UG/ML
50 INJECTION, SOLUTION INTRAMUSCULAR; INTRAVENOUS
Status: DISCONTINUED | OUTPATIENT
Start: 2025-08-12 | End: 2025-08-12 | Stop reason: HOSPADM

## 2025-08-12 RX ORDER — MIDAZOLAM HYDROCHLORIDE 1 MG/ML
0.5 INJECTION, SOLUTION INTRAMUSCULAR; INTRAVENOUS
Status: DISCONTINUED | OUTPATIENT
Start: 2025-08-12 | End: 2025-08-12 | Stop reason: HOSPADM

## 2025-08-12 RX ORDER — SODIUM CHLORIDE 0.9 % (FLUSH) 0.9 %
10 SYRINGE (ML) INJECTION EVERY 12 HOURS SCHEDULED
Status: CANCELLED | OUTPATIENT
Start: 2025-08-12

## 2025-08-12 RX ORDER — METOPROLOL SUCCINATE 25 MG/1
25 TABLET, EXTENDED RELEASE ORAL
Status: COMPLETED | OUTPATIENT
Start: 2025-08-12 | End: 2025-08-12

## 2025-08-12 RX ORDER — SODIUM CHLORIDE, SODIUM LACTATE, POTASSIUM CHLORIDE, CALCIUM CHLORIDE 600; 310; 30; 20 MG/100ML; MG/100ML; MG/100ML; MG/100ML
9 INJECTION, SOLUTION INTRAVENOUS CONTINUOUS
Status: DISCONTINUED | OUTPATIENT
Start: 2025-08-13 | End: 2025-08-12 | Stop reason: HOSPADM

## 2025-08-12 RX ORDER — LIDOCAINE HYDROCHLORIDE 10 MG/ML
5 INJECTION, SOLUTION INFILTRATION; PERINEURAL ONCE
Status: DISCONTINUED | OUTPATIENT
Start: 2025-08-12 | End: 2025-08-12 | Stop reason: HOSPADM

## 2025-08-12 RX ORDER — PROPOFOL 10 MG/ML
VIAL (ML) INTRAVENOUS AS NEEDED
Status: DISCONTINUED | OUTPATIENT
Start: 2025-08-12 | End: 2025-08-12 | Stop reason: SURG

## 2025-08-12 RX ORDER — FENTANYL CITRATE 50 UG/ML
INJECTION, SOLUTION INTRAMUSCULAR; INTRAVENOUS AS NEEDED
Status: DISCONTINUED | OUTPATIENT
Start: 2025-08-12 | End: 2025-08-12 | Stop reason: SURG

## 2025-08-12 RX ORDER — LIDOCAINE HYDROCHLORIDE 10 MG/ML
0.5 INJECTION, SOLUTION EPIDURAL; INFILTRATION; INTRACAUDAL; PERINEURAL ONCE AS NEEDED
Status: COMPLETED | OUTPATIENT
Start: 2025-08-12 | End: 2025-08-12

## 2025-08-12 RX ORDER — ONDANSETRON 2 MG/ML
4 INJECTION INTRAMUSCULAR; INTRAVENOUS ONCE AS NEEDED
Status: DISCONTINUED | OUTPATIENT
Start: 2025-08-12 | End: 2025-08-12 | Stop reason: HOSPADM

## 2025-08-12 RX ADMIN — SODIUM CHLORIDE, POTASSIUM CHLORIDE, SODIUM LACTATE AND CALCIUM CHLORIDE 9 ML/HR: 600; 310; 30; 20 INJECTION, SOLUTION INTRAVENOUS at 10:36

## 2025-08-12 RX ADMIN — LIDOCAINE HYDROCHLORIDE 50 MG: 10 INJECTION, SOLUTION EPIDURAL; INFILTRATION; INTRACAUDAL; PERINEURAL at 12:45

## 2025-08-12 RX ADMIN — DEXAMETHASONE SODIUM PHOSPHATE 8 MG: 4 INJECTION, SOLUTION INTRAMUSCULAR; INTRAVENOUS at 12:45

## 2025-08-12 RX ADMIN — FENTANYL CITRATE 100 MCG: 50 INJECTION, SOLUTION INTRAMUSCULAR; INTRAVENOUS at 12:45

## 2025-08-12 RX ADMIN — LIDOCAINE HYDROCHLORIDE 0.5 ML: 10 INJECTION, SOLUTION EPIDURAL; INFILTRATION; INTRACAUDAL; PERINEURAL at 10:35

## 2025-08-12 RX ADMIN — PROPOFOL 200 MG: 10 INJECTION, EMULSION INTRAVENOUS at 12:45

## 2025-08-12 RX ADMIN — ONDANSETRON 4 MG: 2 INJECTION, SOLUTION INTRAMUSCULAR; INTRAVENOUS at 12:45

## 2025-08-12 RX ADMIN — SODIUM CHLORIDE 2000 MG: 900 INJECTION INTRAVENOUS at 12:48

## 2025-08-12 RX ADMIN — FAMOTIDINE 20 MG: 20 TABLET, FILM COATED ORAL at 10:53

## 2025-08-12 RX ADMIN — Medication 1 ML: at 09:38

## 2025-08-12 RX ADMIN — METOPROLOL SUCCINATE 25 MG: 25 TABLET, FILM COATED, EXTENDED RELEASE ORAL at 10:53

## 2025-08-14 LAB
CYTO UR: NORMAL
LAB AP CASE REPORT: NORMAL
LAB AP CLINICAL INFORMATION: NORMAL
LAB AP SYNOPTIC CHECKLIST: NORMAL
PATH REPORT.FINAL DX SPEC: NORMAL
PATH REPORT.GROSS SPEC: NORMAL

## 2025-08-15 DIAGNOSIS — G25.0 ESSENTIAL TREMOR: ICD-10-CM

## 2025-08-15 DIAGNOSIS — J44.9 CHRONIC OBSTRUCTIVE PULMONARY DISEASE, UNSPECIFIED COPD TYPE: ICD-10-CM

## 2025-08-15 DIAGNOSIS — I25.83 CORONARY ARTERY DISEASE DUE TO LIPID RICH PLAQUE: ICD-10-CM

## 2025-08-15 DIAGNOSIS — I25.10 CORONARY ARTERY DISEASE DUE TO LIPID RICH PLAQUE: ICD-10-CM

## 2025-08-15 DIAGNOSIS — K21.00 GASTROESOPHAGEAL REFLUX DISEASE WITH ESOPHAGITIS WITHOUT HEMORRHAGE: ICD-10-CM

## 2025-08-15 DIAGNOSIS — I10 ESSENTIAL HYPERTENSION: ICD-10-CM

## 2025-08-18 RX ORDER — ISOSORBIDE MONONITRATE 120 MG/1
120 TABLET, EXTENDED RELEASE ORAL EVERY MORNING
Qty: 90 TABLET | Refills: 0 | Status: SHIPPED | OUTPATIENT
Start: 2025-08-18

## 2025-08-18 RX ORDER — ALBUTEROL SULFATE 90 UG/1
2 INHALANT RESPIRATORY (INHALATION) EVERY 4 HOURS PRN
Qty: 18 G | Refills: 3 | Status: SHIPPED | OUTPATIENT
Start: 2025-08-18

## 2025-08-18 RX ORDER — METOPROLOL SUCCINATE 25 MG/1
25 TABLET, EXTENDED RELEASE ORAL DAILY
Qty: 90 TABLET | Refills: 3 | Status: SHIPPED | OUTPATIENT
Start: 2025-08-18

## 2025-08-18 RX ORDER — PRIMIDONE 50 MG/1
100 TABLET ORAL 2 TIMES DAILY
Qty: 360 TABLET | Refills: 3 | Status: SHIPPED | OUTPATIENT
Start: 2025-08-18 | End: 2025-08-19 | Stop reason: SDUPTHER

## 2025-08-18 RX ORDER — ATORVASTATIN CALCIUM 80 MG/1
80 TABLET, FILM COATED ORAL DAILY
Qty: 90 TABLET | Refills: 0 | Status: SHIPPED | OUTPATIENT
Start: 2025-08-18

## 2025-08-18 RX ORDER — UMECLIDINIUM BROMIDE AND VILANTEROL TRIFENATATE 62.5; 25 UG/1; UG/1
1 POWDER RESPIRATORY (INHALATION)
Qty: 180 EACH | Refills: 3 | Status: SHIPPED | OUTPATIENT
Start: 2025-08-18

## 2025-08-18 RX ORDER — PANTOPRAZOLE SODIUM 40 MG/1
TABLET, DELAYED RELEASE ORAL
Qty: 90 TABLET | Refills: 3 | Status: SHIPPED | OUTPATIENT
Start: 2025-08-18

## 2025-08-19 ENCOUNTER — OFFICE VISIT (OUTPATIENT)
Dept: NEUROLOGY | Facility: CLINIC | Age: 70
End: 2025-08-19
Payer: MEDICARE

## 2025-08-19 VITALS
TEMPERATURE: 97.3 F | OXYGEN SATURATION: 97 % | HEART RATE: 70 BPM | HEIGHT: 65 IN | SYSTOLIC BLOOD PRESSURE: 152 MMHG | DIASTOLIC BLOOD PRESSURE: 96 MMHG | BODY MASS INDEX: 27.34 KG/M2 | RESPIRATION RATE: 14 BRPM | WEIGHT: 164.1 LBS

## 2025-08-19 DIAGNOSIS — G25.0 ESSENTIAL TREMOR: ICD-10-CM

## 2025-08-19 RX ORDER — PRIMIDONE 50 MG/1
150 TABLET ORAL 2 TIMES DAILY
Qty: 180 TABLET | Refills: 2 | Status: SHIPPED | OUTPATIENT
Start: 2025-08-19

## 2025-08-22 ENCOUNTER — OFFICE VISIT (OUTPATIENT)
Dept: NEUROSURGERY | Facility: CLINIC | Age: 70
End: 2025-08-22
Payer: MEDICARE

## 2025-08-22 VITALS — WEIGHT: 164.7 LBS | BODY MASS INDEX: 27.44 KG/M2 | HEIGHT: 65 IN | TEMPERATURE: 97 F

## 2025-08-22 DIAGNOSIS — G89.29 CHRONIC BILATERAL LOW BACK PAIN WITH RIGHT-SIDED SCIATICA: ICD-10-CM

## 2025-08-22 DIAGNOSIS — M47.812 CERVICAL SPONDYLOSIS WITHOUT MYELOPATHY: Primary | ICD-10-CM

## 2025-08-22 DIAGNOSIS — M54.41 CHRONIC BILATERAL LOW BACK PAIN WITH RIGHT-SIDED SCIATICA: ICD-10-CM

## 2025-08-27 ENCOUNTER — OFFICE VISIT (OUTPATIENT)
Dept: RADIATION ONCOLOGY | Facility: HOSPITAL | Age: 70
End: 2025-08-27
Payer: MEDICARE

## 2025-08-27 ENCOUNTER — OFFICE VISIT (OUTPATIENT)
Dept: ONCOLOGY | Facility: CLINIC | Age: 70
End: 2025-08-27
Payer: MEDICARE

## 2025-08-27 ENCOUNTER — DOCUMENTATION (OUTPATIENT)
Dept: OTHER | Facility: HOSPITAL | Age: 70
End: 2025-08-27
Payer: MEDICARE

## 2025-08-27 VITALS
OXYGEN SATURATION: 97 % | BODY MASS INDEX: 27.82 KG/M2 | SYSTOLIC BLOOD PRESSURE: 169 MMHG | HEART RATE: 54 BPM | WEIGHT: 167 LBS | RESPIRATION RATE: 24 BRPM | TEMPERATURE: 96.9 F | HEIGHT: 65 IN | DIASTOLIC BLOOD PRESSURE: 69 MMHG

## 2025-08-27 VITALS
HEART RATE: 60 BPM | WEIGHT: 168.4 LBS | RESPIRATION RATE: 22 BRPM | HEIGHT: 65 IN | SYSTOLIC BLOOD PRESSURE: 189 MMHG | BODY MASS INDEX: 28.06 KG/M2 | DIASTOLIC BLOOD PRESSURE: 101 MMHG

## 2025-08-27 DIAGNOSIS — D05.11 DUCTAL CARCINOMA IN SITU (DCIS) OF RIGHT BREAST: Primary | ICD-10-CM

## 2025-08-27 DIAGNOSIS — Z17.0 MALIGNANT NEOPLASM OF UPPER-OUTER QUADRANT OF RIGHT BREAST IN FEMALE, ESTROGEN RECEPTOR POSITIVE: Primary | ICD-10-CM

## 2025-08-27 DIAGNOSIS — C50.411 MALIGNANT NEOPLASM OF UPPER-OUTER QUADRANT OF RIGHT BREAST IN FEMALE, ESTROGEN RECEPTOR POSITIVE: Primary | ICD-10-CM

## 2025-08-27 PROCEDURE — 1126F AMNT PAIN NOTED NONE PRSNT: CPT | Performed by: INTERNAL MEDICINE

## 2025-08-27 PROCEDURE — 3077F SYST BP >= 140 MM HG: CPT | Performed by: INTERNAL MEDICINE

## 2025-08-27 PROCEDURE — 99214 OFFICE O/P EST MOD 30 MIN: CPT | Performed by: INTERNAL MEDICINE

## 2025-08-27 PROCEDURE — 3078F DIAST BP <80 MM HG: CPT | Performed by: INTERNAL MEDICINE

## 2025-08-27 RX ORDER — BUSPIRONE HYDROCHLORIDE 5 MG/1
5 TABLET ORAL 3 TIMES DAILY
COMMUNITY

## 2025-08-29 ENCOUNTER — HOSPITAL ENCOUNTER (OUTPATIENT)
Dept: BONE DENSITY | Facility: HOSPITAL | Age: 70
Discharge: HOME | End: 2025-08-29
Payer: MEDICARE

## 2025-08-29 DIAGNOSIS — D05.11 DUCTAL CARCINOMA IN SITU (DCIS) OF RIGHT BREAST: ICD-10-CM

## 2025-08-29 DIAGNOSIS — Z78.0 POSTMENOPAUSAL: ICD-10-CM

## 2025-08-29 PROCEDURE — 77080 DXA BONE DENSITY AXIAL: CPT

## (undated) DEVICE — ENDOSCOPY PORT CONNECTOR FOR OLYMPUS® SCOPES: Brand: ERBE

## (undated) DEVICE — ST EXT IV SMARTSITE 2VLV SP M LL 5ML IV1

## (undated) DEVICE — HYBRID TUBING/CAP SET FOR OLYMPUS® SCOPES: Brand: ERBE

## (undated) DEVICE — PK CATH CARD 70

## (undated) DEVICE — VLV SXN AIR/H2O ORCAPOD3 1P/U STRL

## (undated) DEVICE — APPL CHLORAPREP TINTED 26ML TEAL

## (undated) DEVICE — ADULT DISPOSABLE SINGLE-PATIENT USE PULSE OXIMETER SENSOR: Brand: NONIN

## (undated) DEVICE — FRCP BIOP COLD ENDOJAW ALLGTR W/NDL 2.8X2300MM BLU

## (undated) DEVICE — SUT SILK 2/0 PS 18IN 1588H

## (undated) DEVICE — Device

## (undated) DEVICE — GLV SURG SENSICARE PI ORTHO SZ7.5 LF STRL

## (undated) DEVICE — SYR CONTRL LUERLOK 10CC

## (undated) DEVICE — GOWN,SIRUS,NONRNF,SETINSLV,XL,20/CS: Brand: MEDLINE

## (undated) DEVICE — CONMED SCOPE SAVER BITE BLOCK, 20X27 MM: Brand: SCOPE SAVER

## (undated) DEVICE — NEEDLE,HYPODERM,SAFETY, 22GX1.5: Brand: MEDLINE

## (undated) DEVICE — PK MINOR SPLT 10

## (undated) DEVICE — CATH F5 INF PIG145 110CM 6SH: Brand: INFINITI

## (undated) DEVICE — CATH F5 INF 3DRC 100CM: Brand: INFINITI

## (undated) DEVICE — CATH F5 INF JL 4 100CM: Brand: INFINITI

## (undated) DEVICE — GW INQWIRE FC PTFE J/3MM .035 180

## (undated) DEVICE — ANTIBACTERIAL UNDYED BRAIDED (POLYGLACTIN 910), SYNTHETIC ABSORBABLE SUTURE: Brand: COATED VICRYL

## (undated) DEVICE — DEV INFL ALLIANCE2 SYS

## (undated) DEVICE — LUBE JELLY PK/2.75GM STRL BX/144

## (undated) DEVICE — DISH PETRI 3.5IN MD STRL LF

## (undated) DEVICE — SUCTION CANISTER, 1500CC, RIGID: Brand: DEROYAL

## (undated) DEVICE — PROXIMATE RH ROTATING HEAD SKIN STAPLERS (35 WIDE) CONTAINS 35 STAINLESS STEEL STAPLES: Brand: PROXIMATE

## (undated) DEVICE — SINGLE-USE POLYPECTOMY SNARE: Brand: CAPTIVATOR II

## (undated) DEVICE — DRSNG SURESITE WNDW 4X4.5

## (undated) DEVICE — ST INF PRI SMRTSTE 20DRP 2VLV 24ML 117

## (undated) DEVICE — MYNXGRIP 5F: Brand: MYNXGRIP

## (undated) DEVICE — CANNULA,OXY,ADULT,SUPER SOFT,W/14'TUB,UC: Brand: MEDLINE INDUSTRIES, INC.

## (undated) DEVICE — Device: Brand: DEFENDO AIR/WATER/SUCTION AND BIOPSY VALVE

## (undated) DEVICE — DRESSING, SURESITE SELECT, 2.8'X3.50': Brand: MEDLINE

## (undated) DEVICE — ESOPHAGEAL BALLOON DILATATION CATHETER: Brand: CRE FIXED WIRE

## (undated) DEVICE — SUT MNCRYL PLS ANTIB UD 4/0 PS2 18IN

## (undated) DEVICE — ELECTRD NDL EZ CLN MOD 2.75IN

## (undated) DEVICE — SUT SILK 3/0 30IN A304H

## (undated) DEVICE — TRAP FLD MINIVAC MEGADYNE 100ML

## (undated) DEVICE — SHEATH INTRO SUPERSHEATH JWIRE .035 5F 11CM

## (undated) DEVICE — FRCP BX RADJAW4 NDL 2.8 240 STD OG